# Patient Record
Sex: MALE | Race: WHITE | Employment: OTHER | URBAN - METROPOLITAN AREA
[De-identification: names, ages, dates, MRNs, and addresses within clinical notes are randomized per-mention and may not be internally consistent; named-entity substitution may affect disease eponyms.]

---

## 2017-06-07 ENCOUNTER — TELEPHONE (OUTPATIENT)
Dept: DERMATOLOGY | Facility: AMBULATORY SURGERY CENTER | Age: 67
End: 2017-06-07

## 2017-06-07 NOTE — TELEPHONE ENCOUNTER
Patient Appointment Date:   06/20/17   2:00pm      Khalida Lawrence, 75 y.o., male  Is calling for their Mohs Pre-Op Assessment    does not have Hepatitis C or HIV   does confirm site  does ID site. Allergies: Allergies not on file      does not have a Pacemaker  does not have a Defibrillator    does not need antibiotics      is not taking NSAIDs    is not taking aspirin      is not taking garlic  is not taking ginkgo  is not taking Ginseng  is not taking fish oils  is not taking vit E    does not take a blood thinner    is not taking Coumadin/Warfarin     Pre operative assessment questions asked to patient. Patient has a general understanding of the procedure, and has been versed that there will be local anesthesia used in the procedure and that He will be ok to drive themselves to and from the appointment.

## 2017-06-20 ENCOUNTER — OFFICE VISIT (OUTPATIENT)
Dept: DERMATOLOGY | Facility: AMBULATORY SURGERY CENTER | Age: 67
End: 2017-06-20

## 2017-06-20 VITALS
DIASTOLIC BLOOD PRESSURE: 78 MMHG | BODY MASS INDEX: 25.11 KG/M2 | WEIGHT: 160 LBS | SYSTOLIC BLOOD PRESSURE: 124 MMHG | HEIGHT: 67 IN | TEMPERATURE: 98.5 F | HEART RATE: 68 BPM | OXYGEN SATURATION: 98 %

## 2017-06-20 DIAGNOSIS — C44.319 BASAL CELL CARCINOMA OF RIGHT CHEEK: Primary | ICD-10-CM

## 2017-06-20 RX ORDER — HYDROCHLOROTHIAZIDE 25 MG/1
25 TABLET ORAL DAILY
Status: ON HOLD | COMMUNITY
End: 2022-08-31

## 2017-06-20 NOTE — PROGRESS NOTES
Pre-op: Patient present today for the evaluation of  to the Right cheek. Procedure explained with full understanding. Vitals:    06/20/17 1401   BP: 124/78   Pulse: 68   Temp: 98.5 °F (36.9 °C)   TempSrc: Oral   SpO2: 98%   Weight: 72.6 kg (160 lb)   Height: 5' 7\" (1.702 m)     preoperatively, will continue to monitor. Post-op: Written and verbal post-op wound care instructions given to patient with full understanding of care. Surgical wound bandaged with Vaseline, Telfa, 2x2 gauze, and coverall tape. All questions and concerns addressed. Vitals stable postoperatively.

## 2017-06-20 NOTE — MR AVS SNAPSHOT
Visit Information Date & Time Provider Department Dept. Phone Encounter #  
 6/20/2017  2:00 PM Chapito Velasquez MD [facility] [id number] Upcoming Health Maintenance Date Due Hepatitis C Screening 1950 DTaP/Tdap/Td series (1 - Tdap) 3/14/1970 FOBT Q 1 YEAR AGE 50-75 3/14/1999 ZOSTER VACCINE AGE 60> 3/14/2009 GLAUCOMA SCREENING Q2Y 3/14/2014 Pneumococcal 65+ Low/Medium Risk (1 of 2 - PCV13) 3/14/2014 MEDICARE YEARLY EXAM 3/14/2014 INFLUENZA AGE 9 TO ADULT 8/1/2017 Allergies as of 6/20/2017  Review Complete On: 6/20/2017 By: Chapito Velasquez MD  
 No Known Allergies Current Immunizations  Never Reviewed No immunizations on file. Not reviewed this visit You Were Diagnosed With   
  
 Codes Comments Basal cell carcinoma of right cheek    -  Primary ICD-10-CM: C44.319 ICD-9-CM: 173.31 Vitals BP Pulse Temp Height(growth percentile) Weight(growth percentile) SpO2  
 124/78 (BP 1 Location: Right arm, BP Patient Position: Sitting) 68 98.5 °F (36.9 °C) (Oral) 5' 7\" (1.702 m) 160 lb (72.6 kg) 98% BMI Smoking Status 25.06 kg/m2 Never Smoker Vitals History BMI and BSA Data Body Mass Index Body Surface Area 25.06 kg/m 2 1.85 m 2 Your Updated Medication List  
  
   
This list is accurate as of: 6/20/17  3:09 PM.  Always use your most recent med list.  
  
  
  
  
 hydroCHLOROthiazide 25 mg tablet Commonly known as:  HYDRODIURIL Take 25 mg by mouth daily. Patient Instructions WOUND CARE INSTRUCTIONS 1. Keep the dressing clean and dry and do not remove for 48 hours. 2. Then change the dressing once a day as follows: 
a. Wash hands before and after each dressing change.  
b. Remove dressing and wash site gently with mild soap and water, rinse, and pat dry. 
c. Apply an ointment (Bacitracin, Polysporin, Neosporin, Petroleum jelly or Aquaphor). d. Apply a non-stick (Telfa) dressing or Band-Aid to cover the wound. Remove pressure bandage Thursday, then wash gently and apply Vaseline and a band-aid to site daily for 1 week. 3. Watch for: BLEEDING: A small amount of drainage may occur. If bleeding occurs, elevate and rest the surgery site. Apply gauze and steady pressure for 15 minutes. If bleeding continues, call this office. INFECTION: Signs of infection include increased redness, pain, warmth, drainage of pus, and fever. If this occurs, call this office. 4. Special Instructions (follow any that are checked): ·  You have stitches that need to be removed in 0 days ·  Avoid bending at the waist and heavy lifting for two days. ·  Sleep with your head elevated for the next two nights. ·  Rest the surgery site and keep it elevated as much as possible for two days. ·  You may apply an ice-pack for 10-15 minutes every waking hour for the rest of the day. ·  Eat a soft diet and avoid hot food and hot drinks for the rest of the day. ·  Other instructions: Follow up as directed Take Tylenol for pain as needed. Once the site is healed with no remaining bandages or open areas, protect your surgical site and scar from the sun, as this area will be more sensitive. Use a broad spectrum sunscreen SPF 30 or higher daily, and a chemical free product (one containing zinc oxide or titanium dioxide) is a good choice if the area is sensitive. You may begin to gently massage the surgical site in 2-3 weeks, rubbing in a circular motion along the scar. This can help reduce swelling and thickness of a scar. A scar cream may be used beginnning 1 month after the surgery. If you have any questions or concerns, please call our office Monday through Friday at [phone]. Introducing [facility]!    
 [employer] introduces [employer] patient portal. Now you can access parts of your medical record, email your doctor's office, and request medication refills online. 1. In your internet browser, go to [email or web address] 2. Click on the First Time User? Click Here link in the Sign In box. You will see the New Member Sign Up page. 3. Enter your [employer] Access Code exactly as it appears below. You will not need to use this code after youve completed the sign-up process. If you do not sign up before the expiration date, you must request a new code. · [employer] Access Code: [email or web address] Expires: 9/18/2017  3:09 PM 
 
4. Enter the last four digits of your Social Security Number (xxxx) and Date of Birth (mm/dd/yyyy) as indicated and click Submit. You will be taken to the next sign-up page. 5. Create a [employer] ID. This will be your [employer] login ID and cannot be changed, so think of one that is secure and easy to remember. 6. Create a [employer] password. You can change your password at any time. 7. Enter your Password Reset Question and Answer. This can be used at a later time if you forget your password. 8. Enter your e-mail address. You will receive e-mail notification when new information is available in [address] 9. Click Sign Up. You can now view and download portions of your medical record. 10. Click the Download Summary menu link to download a portable copy of your medical information. If you have questions, please visit the Frequently Asked Questions section of the [employer] website. Remember, [employer] is NOT to be used for urgent needs. For medical emergencies, dial 911. Now available from your iPhone and Android! Please provide this summary of care documentation to your next provider. Your primary care clinician is listed as Oumou Lawrence. If you have any questions after today's visit, please call [phone].

## 2017-06-20 NOTE — PATIENT INSTRUCTIONS
WOUND CARE INSTRUCTIONS    1. Keep the dressing clean and dry and do not remove for 48 hours. 2. Then change the dressing once a day as follows:  a. Wash hands before and after each dressing change. b. Remove dressing and wash site gently with mild soap and water, rinse, and pat dry.  c. Apply an ointment (Bacitracin, Polysporin, Neosporin, Petroleum jelly or Aquaphor). d. Apply a non-stick (Telfa) dressing or Band-Aid to cover the wound. Remove pressure bandage Thursday, then wash gently and apply Vaseline and a band-aid to site daily for 1 week. 3. Watch for:  BLEEDING: A small amount of drainage may occur. If bleeding occurs, elevate and rest the surgery site. Apply gauze and steady pressure for 15 minutes. If bleeding continues, call this office. INFECTION: Signs of infection include increased redness, pain, warmth, drainage of pus, and fever. If this occurs, call this office. 4. Special Instructions (follow any that are checked):  · [] You have stitches that need to be removed in 0 days  · [x] Avoid bending at the waist and heavy lifting for two days. · [x] Sleep with your head elevated for the next two nights. · [x] Rest the surgery site and keep it elevated as much as possible for two days. · [x] You may apply an ice-pack for 10-15 minutes every waking hour for the rest of the day. · [] Eat a soft diet and avoid hot food and hot drinks for the rest of the day. · [] Other instructions: Follow up as directed  Take Tylenol for pain as needed. Once the site is healed with no remaining bandages or open areas, protect your surgical site and scar from the sun, as this area will be more sensitive. Use a broad spectrum sunscreen SPF 30 or higher daily, and a chemical free product (one containing zinc oxide or titanium dioxide) is a good choice if the area is sensitive. You may begin to gently massage the surgical site in 2-3 weeks, rubbing in a circular motion along the scar.  This can help reduce swelling and thickness of a scar. A scar cream may be used beginnning 1 month after the surgery. If you have any questions or concerns, please call our office Monday through Friday at [phone].

## 2017-06-20 NOTE — PROGRESS NOTES
This note is written by Gilberto Coreas, as dictated by Renetta Pastrana. Yara Patrick MD.    CC: Basal cell carcinoma on the right cheek    History of present illness:     Roque Macias is a 75 y.o. male referred by Dr. Jean Mane. 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 This is a basal cell carcinoma present for more than six months described as a lesion that persisted after cryotherapy with no prior treatment. Biopsy confirmed the diagnosis of basal cell carcinoma, and I reviewed the written pathology report. He is feeling well and in his usual state of health today. He has no pain, no current illnesses, no other skin concerns. His allergies, medications, medical, and social history are reviewed by me today. Exam:     He is an awake, alert, and oriented 75 y.o. male who appears well and in no distress. There is no preauricular, submandibular, or cervical lymphadenopathy. I examined his face. He has a 18 x 11 mm indistinct scar-like lesion on his right cheek. He confirms location. Assessment/plan:    1. Basal cell carcinoma, right cheek. I discussed the diagnosis of basal cell carcinoma and summarized the pathology report. Mohs surgery is indicated by site, size, poor definition, and histology. The procedure was discussed, verbal and written consent were obtained. I performed the procedure. Four stages were required to reach a tumor free plane. The surgical defect was managed with complex repair. There were no complications. He will follow up as needed as the site heals. Indications, risks, and options were discussed with Roque Macias preoperatively. Risks including, but not limited to: pain, bleeding, infection, tumor recurrence, scarring and damage to motor and/or sensory nerves, were discussed. Roque Macias chose Mohs surgery. Roque Macias was an acceptable surgery candidate.     Roque Macias was placed in the appropriate position on the operating table in the Mohs surgery procedure room. The area was prepped and draped in the standard manner. Gentian violet was used to outline the clinical margins of the tumor. Local anesthesia was then obtained. The grossly visible tumor was then removed, an underlying layer was excised and mapped according to the Mohs technique, and the individual specimens examined microscopically. The process was repeated until microscopic examination of the tissue specimens confirmed a tumor-free plane. Hemostasis was obtained with electrosurgery and pressure. The wound was covered between stages with moist saline gauze. Wound care instructions (written and verbal) and a follow up appointment were given to Glynn Kayeeverardo before discharge. Glynn Medina was discharged in good condition. The wound management options of second intent healing, layered closure, local flap, and/or full thickness skin graft were discussed. Glynn Medina understands the aims, risks, alternatives, and possible complications and elects to proceed with a complex layered closure. Wound margins were made vertical, edges undermined in the subcutaneous plane, standing cones corrected at both poles followed by layered closure. The wound was closed with buried 5-0 polysorb suture in the muscle and deep subcutis to reduce width of the wound, a second layer in the dermis to reduce tension on the skin edges, and skin edges were approximated with 6-0 fast gut suture. The final closure length was 45 mm. The wound was bandaged with Vaseline, Telfa, gauze and Coverroll. Wound care instructions (written and verbal) and a follow up appointment were given to Glynn Medina before discharge. Glynn Medina was discharged in good condition. 2. History of skin cancer. I discussed the diagnosis and recommend routine examinations with Dr. Lynn To for surveillance.     The documentation recorded by the scribe accurately reflects the service I personally performed and the decisions made by me. [facility]   OFFICE PROCEDURE PROGRESS NOTE     Chart reviewed for the following:     Nelida Homans Dana Qua, MD, have reviewed the History, Physical and updated the Allergic reactions for [address] performed immediately prior to start of procedure:     Nelida Homans Dana Qua, MD, have performed the following reviews on Alanson Cheadle prior to the start of the procedure:     * Patient was identified by name and date of birth   * Agreement on procedure being performed was verified   * Risks and Benefits explained to the patient   * Procedure site verified and marked as necessary   * Patient was positioned for comfort   * Consent was signed and verified     Time: 2:20PM  Date of procedure: 6/20/2017  Procedure performed by: iCndy Lara.  Nicole Tinoco MD   Provider assisted by: LPN   Patient assisted by: self   How tolerated by patient: tolerated the procedure well with no complications   Comments: none

## 2017-07-25 RX ORDER — FAMOTIDINE 10 MG/1
10 TABLET ORAL AS NEEDED
COMMUNITY

## 2017-07-26 ENCOUNTER — HOSPITAL ENCOUNTER (OUTPATIENT)
Age: 67
Setting detail: OUTPATIENT SURGERY
Discharge: HOME | End: 2017-07-26
Attending: SPECIALIST | Admitting: SPECIALIST
Payer: COMMERCIAL

## 2017-07-26 ENCOUNTER — ANESTHESIA (OUTPATIENT)
Dept: ENDOSCOPY | Age: 67
End: 2017-07-26
Payer: COMMERCIAL

## 2017-07-26 ENCOUNTER — ANESTHESIA EVENT (OUTPATIENT)
Dept: ENDOSCOPY | Age: 67
End: 2017-07-26
Payer: COMMERCIAL

## 2017-07-26 VITALS
HEIGHT: 67 IN | SYSTOLIC BLOOD PRESSURE: 124 MMHG | OXYGEN SATURATION: 96 % | TEMPERATURE: 97.7 F | BODY MASS INDEX: 25.11 KG/M2 | WEIGHT: 160 LBS | DIASTOLIC BLOOD PRESSURE: 80 MMHG | HEART RATE: 76 BPM | RESPIRATION RATE: 18 BRPM

## 2017-07-26 PROCEDURE — 77030027957 HC TBNG IRR ENDOGTR BUSS -B: Performed by: SPECIALIST

## 2017-07-26 PROCEDURE — 74011250636 HC RX REV CODE- 250/636: Performed by: SPECIALIST

## 2017-07-26 PROCEDURE — 76040000019: Performed by: SPECIALIST

## 2017-07-26 PROCEDURE — 76060000031 HC ANESTHESIA FIRST 0.5 HR: Performed by: SPECIALIST

## 2017-07-26 PROCEDURE — 74011250636 HC RX REV CODE- 250/636

## 2017-07-26 PROCEDURE — 74011000250 HC RX REV CODE- 250

## 2017-07-26 RX ORDER — MIDAZOLAM HYDROCHLORIDE 1 MG/ML
.25-1 INJECTION, SOLUTION INTRAMUSCULAR; INTRAVENOUS
Status: DISCONTINUED | OUTPATIENT
Start: 2017-07-26 | End: 2017-07-26 | Stop reason: HOSPADM

## 2017-07-26 RX ORDER — SODIUM CHLORIDE 9 MG/ML
INJECTION, SOLUTION INTRAVENOUS
Status: DISCONTINUED | OUTPATIENT
Start: 2017-07-26 | End: 2017-07-26 | Stop reason: HOSPADM

## 2017-07-26 RX ORDER — SODIUM CHLORIDE 9 MG/ML
50 INJECTION, SOLUTION INTRAVENOUS CONTINUOUS
Status: DISCONTINUED | OUTPATIENT
Start: 2017-07-26 | End: 2017-07-26 | Stop reason: HOSPADM

## 2017-07-26 RX ORDER — NALOXONE HYDROCHLORIDE 0.4 MG/ML
0.4 INJECTION, SOLUTION INTRAMUSCULAR; INTRAVENOUS; SUBCUTANEOUS
Status: DISCONTINUED | OUTPATIENT
Start: 2017-07-26 | End: 2017-07-26 | Stop reason: HOSPADM

## 2017-07-26 RX ORDER — ATROPINE SULFATE 0.1 MG/ML
0.5 INJECTION INTRAVENOUS
Status: DISCONTINUED | OUTPATIENT
Start: 2017-07-26 | End: 2017-07-26 | Stop reason: HOSPADM

## 2017-07-26 RX ORDER — SODIUM CHLORIDE 0.9 % (FLUSH) 0.9 %
5-10 SYRINGE (ML) INJECTION EVERY 8 HOURS
Status: DISCONTINUED | OUTPATIENT
Start: 2017-07-26 | End: 2017-07-26 | Stop reason: HOSPADM

## 2017-07-26 RX ORDER — FLUMAZENIL 0.1 MG/ML
0.2 INJECTION INTRAVENOUS
Status: DISCONTINUED | OUTPATIENT
Start: 2017-07-26 | End: 2017-07-26 | Stop reason: HOSPADM

## 2017-07-26 RX ORDER — PROPOFOL 10 MG/ML
INJECTION, EMULSION INTRAVENOUS AS NEEDED
Status: DISCONTINUED | OUTPATIENT
Start: 2017-07-26 | End: 2017-07-26 | Stop reason: HOSPADM

## 2017-07-26 RX ORDER — LIDOCAINE HYDROCHLORIDE 20 MG/ML
INJECTION, SOLUTION EPIDURAL; INFILTRATION; INTRACAUDAL; PERINEURAL AS NEEDED
Status: DISCONTINUED | OUTPATIENT
Start: 2017-07-26 | End: 2017-07-26 | Stop reason: HOSPADM

## 2017-07-26 RX ORDER — FENTANYL CITRATE 50 UG/ML
200 INJECTION, SOLUTION INTRAMUSCULAR; INTRAVENOUS
Status: DISCONTINUED | OUTPATIENT
Start: 2017-07-26 | End: 2017-07-26 | Stop reason: HOSPADM

## 2017-07-26 RX ORDER — LORAZEPAM 2 MG/ML
2 INJECTION INTRAMUSCULAR AS NEEDED
Status: DISCONTINUED | OUTPATIENT
Start: 2017-07-26 | End: 2017-07-26 | Stop reason: HOSPADM

## 2017-07-26 RX ORDER — DEXTROMETHORPHAN/PSEUDOEPHED 2.5-7.5/.8
1.2 DROPS ORAL
Status: DISCONTINUED | OUTPATIENT
Start: 2017-07-26 | End: 2017-07-26 | Stop reason: HOSPADM

## 2017-07-26 RX ORDER — SODIUM CHLORIDE 0.9 % (FLUSH) 0.9 %
5-10 SYRINGE (ML) INJECTION AS NEEDED
Status: DISCONTINUED | OUTPATIENT
Start: 2017-07-26 | End: 2017-07-26 | Stop reason: HOSPADM

## 2017-07-26 RX ORDER — EPINEPHRINE 0.1 MG/ML
1 INJECTION INTRACARDIAC; INTRAVENOUS
Status: DISCONTINUED | OUTPATIENT
Start: 2017-07-26 | End: 2017-07-26 | Stop reason: HOSPADM

## 2017-07-26 RX ADMIN — SODIUM CHLORIDE: 9 INJECTION, SOLUTION INTRAVENOUS at 07:32

## 2017-07-26 RX ADMIN — LIDOCAINE HYDROCHLORIDE 100 MG: 20 INJECTION, SOLUTION EPIDURAL; INFILTRATION; INTRACAUDAL; PERINEURAL at 07:32

## 2017-07-26 RX ADMIN — PROPOFOL 30 MG: 10 INJECTION, EMULSION INTRAVENOUS at 07:39

## 2017-07-26 RX ADMIN — PROPOFOL 80 MG: 10 INJECTION, EMULSION INTRAVENOUS at 07:32

## 2017-07-26 RX ADMIN — PROPOFOL 40 MG: 10 INJECTION, EMULSION INTRAVENOUS at 07:37

## 2017-07-26 NOTE — PROCEDURES
Colonoscopy Procedure Note    Indications:   Family history of coloretal adenoma  (screening only) polyps in father, Screening colonoscopy  Referring Physician: Valeria George MD   Anesthesia/Sedation:MAC  Endoscopist:  Dr. Carmen Mabry  Assistant:  Endoscopy Technician-1: Georgina Corey  Endoscopy RN-1: Nicholas Feng RN    Preoperative diagnosis: SCREENING    Postoperative diagnosis: Mild diverticulosis      Procedure in Detail:  Informed consent was obtained for the procedure, including sedation. Risks of perforation, hemorrhage, adverse drug reaction, and aspiration were discussed. The patient was placed in the left lateral decubitus position. Based on the pre-procedure assessment, including review of the patient's medical history, medications, allergies, and review of systems, he had been deemed to be an appropriate candidate for moderate sedation; he was therefore sedated with the medications listed above. The patient was monitored continuously with ECG tracing, pulse oximetry, blood pressure monitoring, and direct observations. A rectal examination was performed. The [id number] was inserted into the rectum and advanced under direct vision to the cecum, which was identified by the ileocecal valve and appendiceal orifice. The quality of the colonic preparation was excellent. A careful inspection was made as the colonoscope was withdrawn, including a retroflexed view of the rectum; findings and interventions are described below. Findings:   Rectum: normal  Sigmoid: mild diverticulosis; Descending Colon: normal  Transverse Colon: normal  Ascending Colon: normal  Cecum: normal  Terminal Ileum: not intubated    Specimens:     none    EBL: None    Complications: None; patient tolerated the procedure well. Recommendations:     - Repeat colonoscopy in 5 years.      - If < 10 years, reason: above average risk patient     - FHx of colon polyps in father    Signed By: Kirstin Vigil Virgie Guajardo MD                        July 26, 2017

## 2017-07-26 NOTE — DISCHARGE INSTRUCTIONS
Karolyn Choi  [id number]  1950    COLON DISCHARGE INSTRUCTIONS  Discomfort:  Redness at IV site- apply warm compress to area; if redness or soreness persist- contact your physician  There may be a slight amount of blood passed from the rectum  Gaseous discomfort- walking, belching will help relieve any discomfort  You may not operate a vehicle for 12 hours  You may not engage in an occupation involving machinery or appliances for rest of today  You may not drink alcoholic beverages for at least 12 hours  Avoid making any critical decisions for at least 24 hour  DIET:   Regular diet. - however -  remember your colon is empty and a heavy meal will produce gas. Avoid these foods:  vegetables, fried / greasy foods, carbonated drinks for today. ACTIVITY:  You may resume your normal daily activities it is recommended that you spend the remainder of the day resting -  avoid any strenuous activity. CALL M.D. ANY SIGN OF:  Increasing pain, nausea, vomiting  Abdominal distension (swelling)  New increased bleeding (oral or rectal)  Fever (chills)  Pain in chest area  Bloody discharge from nose or mouth  Shortness of breath    You may  take any Advil, Aspirin, Ibuprofen, Motrin, Aleve, Goodys, or any similar pain or arthritis products or Tylenol as needed for pain. Follow-up Instructions:   Call Dr. Coral Mata telephone for problems or questions [phone]    - Repeat colonoscopy in 5 years.      - If < 10 years, reason: above average risk patient     - FHx of colon polyps in father

## 2017-07-26 NOTE — ANESTHESIA PREPROCEDURE EVALUATION
Anesthetic History   No history of anesthetic complications            Review of Systems / Medical History  Patient summary reviewed, nursing notes reviewed and pertinent labs reviewed    Pulmonary  Within defined limits                 Neuro/Psych   Within defined limits           Cardiovascular  Within defined limits  Hypertension                   GI/Hepatic/Renal  Within defined limits              Endo/Other  Within defined limits           Other Findings              Physical Exam    Airway  Mallampati: II  TM Distance: > 6 cm  Neck ROM: normal range of motion   Mouth opening: Normal     Cardiovascular  Regular rate and rhythm,  S1 and S2 normal,  no murmur, click, rub, or gallop             Dental  No notable dental hx       Pulmonary  Breath sounds clear to auscultation               Abdominal  GI exam deferred       Other Findings            Anesthetic Plan    ASA: 2  Anesthesia type: MAC          Induction: Intravenous  Anesthetic plan and risks discussed with: Patient

## 2017-07-26 NOTE — H&P
Pre-endoscopy H and P for Colonoscopy    The patient was seen and examined. Date of last colonoscopy: 2006, Polyps  No      The airway was assessed and documented. The problem list, past medical history, and medications were reviewed. There is no problem list on file for this patient. Social History     Social History    Marital status:      Spouse name: N/A    Number of children: N/A    Years of education: N/A     Occupational History    Not on file. Social History Main Topics    Smoking status: Never Smoker    Smokeless tobacco: Never Used    Alcohol use Yes      Comment: twice a week    Drug use: Not on file    Sexual activity: Not on file     Other Topics Concern    Not on file     Social History Narrative     Past Medical History:   Diagnosis Date    Cancer ([facility] [address]     BCCA skin    Family history of skin cancer     Hypertension     Ill-defined condition     meineer's disease     The patient has a family history of na    Prior to Admission Medications   Prescriptions Last Dose Informant Patient Reported? Taking?   famotidine (PEPCID) 10 mg tablet 7/25/2017 at Unknown time  Yes Yes   Sig: Take 10 mg by mouth as needed. hydroCHLOROthiazide (HYDRODIURIL) 25 mg tablet 7/25/2017 at Unknown time  Yes Yes   Sig: Take 25 mg by mouth daily. Facility-Administered Medications: None         The review of systems is:  negative for shortness of breath or chest pain      The heart, lungs and mental status were satisfactory for the administration of MAC sedation and for the procedure. Mallampati score: See Anesthesia. I discussed with the patient the objectives, risks, consequences and alternatives to the procedure. Plan: Endoscopic procedure with MAC sedation.     Adonis Bird MD  7/26/2017  7:27 AM

## 2017-07-26 NOTE — ANESTHESIA POSTPROCEDURE EVALUATION
Post-Anesthesia Evaluation and Assessment    Patient: Jerry Carmona MRN: [id number]  SSN: [id number]    YOB: 1950  Age: 75 y.o. Sex: male       Cardiovascular Function/Vital Signs  Visit Vitals    /70    Pulse 73    Resp 12    Ht 5' 7\" (1.702 m)    Wt 72.6 kg (160 lb)    SpO2 98%    BMI 25.06 kg/m2       Patient is status post MAC anesthesia for Procedure(s):  COLONOSCOPY. Nausea/Vomiting: None    Postoperative hydration reviewed and adequate. Pain:  Pain Scale 1: Numeric (0 - 10) (07/26/17 0752)  Pain Intensity 1: 0 (07/26/17 0752)   Managed    Neurological Status: At baseline    Mental Status and Level of Consciousness: Arousable    Pulmonary Status:   O2 Device: Room air (07/26/17 0752)   Adequate oxygenation and airway patent    Complications related to anesthesia: None    Post-anesthesia assessment completed.  No concerns    Signed By: Satish Ortiz MD     July 26, 2017

## 2017-07-26 NOTE — PERIOP NOTES
Patient has been evaluated by anesthesia pre-procedure. Patient alert and oriented. Vital signs will not be charted by the Endoscopy nurse. All vitals, airway, and loc are monitored by anesthesia staff throughout procedure. .Endoscope was pre-cleaned at bedside immediately following procedure by KORINA Dill.

## 2017-07-26 NOTE — IP AVS SNAPSHOT
[address] 
[phone] Patient: Ernst Blair MRN: [id number] AIO:[id number]/3371 You are allergic to the following No active allergies Recent Documentation Height Weight BMI Smoking Status 1.702 m 72.6 kg 25.06 kg/m2 Never Smoker Emergency Contacts Name Discharge Info Relation Home Work Mobile Darlene Pollen  Spouse [3] [phone] About your hospitalization You were admitted on:  July 26, 2017 You last received care in the:  [facility] ENDOSCOPY You were discharged on:  July 26, 2017 Unit phone number:  [phone] Why you were hospitalized Your primary diagnosis was:  Not on File Providers Seen During Your Hospitalizations Provider Role Specialty Primary office phone Marie Moore MD Attending Provider Gastroenterology [phone] Your Primary Care Physician (PCP) Primary Care Physician Office Phone Office Fax Kashif Diaz [phone] Follow-up Information None Current Discharge Medication List  
  
CONTINUE these medications which have NOT CHANGED Dose & Instructions Dispensing Information Comments Morning Noon Evening Bedtime  
 famotidine 10 mg tablet Commonly known as:  PEPCID Your last dose was: Your next dose is:    
   
   
 Dose:  10 mg Take 10 mg by mouth as needed. Refills:  0  
     
   
   
   
  
 hydroCHLOROthiazide 25 mg tablet Commonly known as:  HYDRODIURIL Your last dose was: Your next dose is:    
   
   
 Dose:  25 mg Take 25 mg by mouth daily. Refills:  0 Discharge Instructions Ernst Blair [id number] COLON DISCHARGE INSTRUCTIONS Discomfort: 
Redness at IV site- apply warm compress to area; if redness or soreness persist- contact your physician There may be a slight amount of blood passed from the rectum Gaseous discomfort- walking, belching will help relieve any discomfort You may not operate a vehicle for 12 hours You may not engage in an occupation involving machinery or appliances for rest of today You may not drink alcoholic beverages for at least 12 hours Avoid making any critical decisions for at least 24 hour DIET: 
 Regular diet.  however -  remember your colon is empty and a heavy meal will produce gas. Avoid these foods:  vegetables, fried / greasy foods, carbonated drinks for today. ACTIVITY: 
You may resume your normal daily activities it is recommended that you spend the remainder of the day resting -  avoid any strenuous activity. CALL M.D. ANY SIGN OF: Increasing pain, nausea, vomiting Abdominal distension (swelling) New increased bleeding (oral or rectal) Fever (chills) Pain in chest area Bloody discharge from nose or mouth Shortness of breath You may  take any Advil, Aspirin, Ibuprofen, Motrin, Aleve, Goodys, or any similar pain or arthritis products or Tylenol as needed for pain. Follow-up Instructions: 
 Call Dr. Camp Sensing Office telephone for problems or questions [phone] 
  - Repeat colonoscopy in 5 years. - If < 10 years, reason: above average risk patient 
   - FHx of colon polyps in father Discharge Orders None Introducing \[facility]\"" & HEALTH SERVICES! Walter Davenport introduces [facility] patient portal. Now you can access parts of your medical record, email your doctor's office, and request medication refills online. 1. In your internet browser, go to [email or web address] 2. Click on the First Time User? Click Here link in the Sign In box. You will see the New Member Sign Up page. 3. Enter your [facility] Access Code exactly as it appears below. You will not need to use this code after youve completed the sign-up process.  If you do not sign up before the expiration date, you must request a new code. · [employer] Access Code: [email or web address] Expires: 9/18/2017  3:09 PM 
 
4. Enter the last four digits of your Social Security Number (xxxx) and Date of Birth (mm/dd/yyyy) as indicated and click Submit. You will be taken to the next sign-up page. 5. Create a [employer] ID. This will be your [employer] login ID and cannot be changed, so think of one that is secure and easy to remember. 6. Create a [employer] password. You can change your password at any time. 7. Enter your Password Reset Question and Answer. This can be used at a later time if you forget your password. 8. Enter your e-mail address. You will receive e-mail notification when new information is available in [address] 9. Click Sign Up. You can now view and download portions of your medical record. 10. Click the Download Summary menu link to download a portable copy of your medical information. If you have questions, please visit the Frequently Asked Questions section of the [employer] website. Remember, [employer] is NOT to be used for urgent needs. For medical emergencies, dial 911. Now available from your iPhone and Android! General Information Please provide this summary of care documentation to your next provider. Patient Signature:  ____________________________________________________________ Date:  ____________________________________________________________  
  
Jose David [facility] Provider Signature:  ____________________________________________________________ Date:  ____________________________________________________________

## 2018-07-12 PROCEDURE — 88342 IMHCHEM/IMCYTCHM 1ST ANTB: CPT | Performed by: PLASTIC SURGERY

## 2018-07-12 PROCEDURE — 88305 TISSUE EXAM BY PATHOLOGIST: CPT | Performed by: PLASTIC SURGERY

## 2018-07-13 ENCOUNTER — HOSPITAL ENCOUNTER (OUTPATIENT)
Dept: LAB | Age: 68
Discharge: HOME | End: 2018-07-13

## 2019-05-14 ENCOUNTER — HOSPITAL ENCOUNTER (INPATIENT)
Age: 69
LOS: 17 days | Discharge: NURSING, REHAB OR LONG-TERM CARE | DRG: 028 | End: 2019-06-01
Attending: STUDENT IN AN ORGANIZED HEALTH CARE EDUCATION/TRAINING PROGRAM | Admitting: INTERNAL MEDICINE
Payer: MEDICARE

## 2019-05-14 ENCOUNTER — APPOINTMENT (OUTPATIENT)
Dept: GENERAL RADIOLOGY | Age: 69
DRG: 028 | End: 2019-05-14
Attending: STUDENT IN AN ORGANIZED HEALTH CARE EDUCATION/TRAINING PROGRAM
Payer: MEDICARE

## 2019-05-14 ENCOUNTER — APPOINTMENT (OUTPATIENT)
Dept: CT IMAGING | Age: 69
DRG: 028 | End: 2019-05-14
Attending: STUDENT IN AN ORGANIZED HEALTH CARE EDUCATION/TRAINING PROGRAM
Payer: MEDICARE

## 2019-05-14 DIAGNOSIS — F10.929 ALCOHOLIC INTOXICATION WITH COMPLICATION (HCC): Primary | ICD-10-CM

## 2019-05-14 DIAGNOSIS — F10.121 ALCOHOL INTOXICATION DELIRIUM (HCC): ICD-10-CM

## 2019-05-14 DIAGNOSIS — R56.9 SEIZURE (HCC): ICD-10-CM

## 2019-05-14 DIAGNOSIS — R90.89 ABNORMAL BRAIN MRI: ICD-10-CM

## 2019-05-14 DIAGNOSIS — S14.101A SPINAL CORD INJURY AT C1-C4 LEVEL, INITIAL ENCOUNTER (HCC): ICD-10-CM

## 2019-05-14 DIAGNOSIS — S14.129A CENTRAL CORD SYNDROME, INITIAL ENCOUNTER (HCC): ICD-10-CM

## 2019-05-14 LAB
ALBUMIN SERPL-MCNC: 3.8 G/DL (ref 3.5–5)
ALBUMIN/GLOB SERPL: 1.2 {RATIO} (ref 1.1–2.2)
ALP SERPL-CCNC: 82 U/L (ref 45–117)
ALT SERPL-CCNC: 24 U/L (ref 12–78)
AMPHET UR QL SCN: NEGATIVE
ANION GAP SERPL CALC-SCNC: 8 MMOL/L (ref 5–15)
APAP SERPL-MCNC: <2 UG/ML (ref 10–30)
APPEARANCE UR: CLEAR
AST SERPL-CCNC: 20 U/L (ref 15–37)
BARBITURATES UR QL SCN: NEGATIVE
BASOPHILS # BLD: 0 K/UL (ref 0–0.1)
BASOPHILS NFR BLD: 0 % (ref 0–1)
BENZODIAZ UR QL: NEGATIVE
BILIRUB SERPL-MCNC: 0.5 MG/DL (ref 0.2–1)
BILIRUB UR QL: NEGATIVE
BUN SERPL-MCNC: 13 MG/DL (ref 6–20)
BUN/CREAT SERPL: 15 (ref 12–20)
CALCIUM SERPL-MCNC: 8.6 MG/DL (ref 8.5–10.1)
CANNABINOIDS UR QL SCN: NEGATIVE
CHLORIDE SERPL-SCNC: 108 MMOL/L (ref 97–108)
CK SERPL-CCNC: 111 U/L (ref 39–308)
CO2 SERPL-SCNC: 26 MMOL/L (ref 21–32)
COCAINE UR QL SCN: NEGATIVE
COLOR UR: ABNORMAL
CREAT SERPL-MCNC: 0.89 MG/DL (ref 0.7–1.3)
DIFFERENTIAL METHOD BLD: ABNORMAL
DRUG SCRN COMMENT,DRGCM: NORMAL
EOSINOPHIL # BLD: 0 K/UL (ref 0–0.4)
EOSINOPHIL NFR BLD: 0 % (ref 0–7)
ERYTHROCYTE [DISTWIDTH] IN BLOOD BY AUTOMATED COUNT: 13.8 % (ref 11.5–14.5)
ETHANOL SERPL-MCNC: 162 MG/DL
GLOBULIN SER CALC-MCNC: 3.3 G/DL (ref 2–4)
GLUCOSE SERPL-MCNC: 96 MG/DL (ref 65–100)
GLUCOSE UR STRIP.AUTO-MCNC: NEGATIVE MG/DL
HCT VFR BLD AUTO: 47.3 % (ref 36.6–50.3)
HGB BLD-MCNC: 15.1 G/DL (ref 12.1–17)
HGB UR QL STRIP: NEGATIVE
IMM GRANULOCYTES # BLD AUTO: 0.1 K/UL (ref 0–0.04)
IMM GRANULOCYTES NFR BLD AUTO: 1 % (ref 0–0.5)
KETONES UR QL STRIP.AUTO: 15 MG/DL
LACTATE SERPL-SCNC: 3 MMOL/L (ref 0.4–2)
LACTATE SERPL-SCNC: 3.5 MMOL/L (ref 0.4–2)
LEUKOCYTE ESTERASE UR QL STRIP.AUTO: NEGATIVE
LIPASE SERPL-CCNC: 88 U/L (ref 73–393)
LYMPHOCYTES # BLD: 1 K/UL (ref 0.8–3.5)
LYMPHOCYTES NFR BLD: 7 % (ref 12–49)
MAGNESIUM SERPL-MCNC: 2.3 MG/DL (ref 1.6–2.4)
MCH RBC QN AUTO: 29.1 PG (ref 26–34)
MCHC RBC AUTO-ENTMCNC: 31.9 G/DL (ref 30–36.5)
MCV RBC AUTO: 91.1 FL (ref 80–99)
METHADONE UR QL: NEGATIVE
MONOCYTES # BLD: 0.4 K/UL (ref 0–1)
MONOCYTES NFR BLD: 3 % (ref 5–13)
NEUTS SEG # BLD: 12.9 K/UL (ref 1.8–8)
NEUTS SEG NFR BLD: 89 % (ref 32–75)
NITRITE UR QL STRIP.AUTO: NEGATIVE
NRBC # BLD: 0 K/UL (ref 0–0.01)
NRBC BLD-RTO: 0 PER 100 WBC
OPIATES UR QL: NEGATIVE
PCP UR QL: NEGATIVE
PH UR STRIP: 5.5 [PH] (ref 5–8)
PLATELET # BLD AUTO: 216 K/UL (ref 150–400)
PMV BLD AUTO: 9.7 FL (ref 8.9–12.9)
POTASSIUM SERPL-SCNC: 3.9 MMOL/L (ref 3.5–5.1)
PROT SERPL-MCNC: 7.1 G/DL (ref 6.4–8.2)
PROT UR STRIP-MCNC: NEGATIVE MG/DL
RBC # BLD AUTO: 5.19 M/UL (ref 4.1–5.7)
SALICYLATES SERPL-MCNC: <1.7 MG/DL (ref 2.8–20)
SODIUM SERPL-SCNC: 142 MMOL/L (ref 136–145)
SP GR UR REFRACTOMETRY: 1.02 (ref 1–1.03)
TROPONIN I SERPL-MCNC: <0.05 NG/ML
UR CULT HOLD, URHOLD: NORMAL
UROBILINOGEN UR QL STRIP.AUTO: 0.2 EU/DL (ref 0.2–1)
WBC # BLD AUTO: 14.4 K/UL (ref 4.1–11.1)

## 2019-05-14 PROCEDURE — 81003 URINALYSIS AUTO W/O SCOPE: CPT

## 2019-05-14 PROCEDURE — 84484 ASSAY OF TROPONIN QUANT: CPT

## 2019-05-14 PROCEDURE — 74011000250 HC RX REV CODE- 250: Performed by: STUDENT IN AN ORGANIZED HEALTH CARE EDUCATION/TRAINING PROGRAM

## 2019-05-14 PROCEDURE — 83735 ASSAY OF MAGNESIUM: CPT

## 2019-05-14 PROCEDURE — 83605 ASSAY OF LACTIC ACID: CPT

## 2019-05-14 PROCEDURE — 96365 THER/PROPH/DIAG IV INF INIT: CPT

## 2019-05-14 PROCEDURE — 82550 ASSAY OF CK (CPK): CPT

## 2019-05-14 PROCEDURE — 96361 HYDRATE IV INFUSION ADD-ON: CPT

## 2019-05-14 PROCEDURE — 74011250636 HC RX REV CODE- 250/636: Performed by: STUDENT IN AN ORGANIZED HEALTH CARE EDUCATION/TRAINING PROGRAM

## 2019-05-14 PROCEDURE — 80053 COMPREHEN METABOLIC PANEL: CPT

## 2019-05-14 PROCEDURE — 80307 DRUG TEST PRSMV CHEM ANLYZR: CPT

## 2019-05-14 PROCEDURE — 36415 COLL VENOUS BLD VENIPUNCTURE: CPT

## 2019-05-14 PROCEDURE — 71045 X-RAY EXAM CHEST 1 VIEW: CPT

## 2019-05-14 PROCEDURE — 72125 CT NECK SPINE W/O DYE: CPT

## 2019-05-14 PROCEDURE — 93005 ELECTROCARDIOGRAM TRACING: CPT

## 2019-05-14 PROCEDURE — 85025 COMPLETE CBC W/AUTO DIFF WBC: CPT

## 2019-05-14 PROCEDURE — 83690 ASSAY OF LIPASE: CPT

## 2019-05-14 PROCEDURE — 96375 TX/PRO/DX INJ NEW DRUG ADDON: CPT

## 2019-05-14 PROCEDURE — 99285 EMERGENCY DEPT VISIT HI MDM: CPT

## 2019-05-14 PROCEDURE — 70450 CT HEAD/BRAIN W/O DYE: CPT

## 2019-05-14 RX ORDER — ONDANSETRON 2 MG/ML
4 INJECTION INTRAMUSCULAR; INTRAVENOUS
Status: COMPLETED | OUTPATIENT
Start: 2019-05-14 | End: 2019-05-14

## 2019-05-14 RX ADMIN — ONDANSETRON 4 MG: 2 INJECTION INTRAMUSCULAR; INTRAVENOUS at 21:21

## 2019-05-14 RX ADMIN — SODIUM CHLORIDE 1000 ML: 900 INJECTION, SOLUTION INTRAVENOUS at 20:11

## 2019-05-14 RX ADMIN — FOLIC ACID: 5 INJECTION, SOLUTION INTRAMUSCULAR; INTRAVENOUS; SUBCUTANEOUS at 21:12

## 2019-05-14 RX ADMIN — SODIUM CHLORIDE 1000 ML: 900 INJECTION, SOLUTION INTRAVENOUS at 18:54

## 2019-05-14 NOTE — ED TRIAGE NOTES
Pt presents from EMS after \"placing an enema in my rectum and putting a third of a bottle of wine up it\" Pt states he wanted to get drunk.  EMS reports pt wife found him face down in the garden

## 2019-05-15 ENCOUNTER — APPOINTMENT (OUTPATIENT)
Dept: CT IMAGING | Age: 69
DRG: 028 | End: 2019-05-15
Attending: INTERNAL MEDICINE
Payer: MEDICARE

## 2019-05-15 ENCOUNTER — APPOINTMENT (OUTPATIENT)
Dept: GENERAL RADIOLOGY | Age: 69
DRG: 028 | End: 2019-05-15
Attending: INTERNAL MEDICINE
Payer: MEDICARE

## 2019-05-15 PROBLEM — F10.929 ALCOHOL INTOXICATION (HCC): Status: ACTIVE | Noted: 2019-05-15

## 2019-05-15 PROBLEM — F10.121 ALCOHOL INTOXICATION DELIRIUM (HCC): Status: ACTIVE | Noted: 2019-05-15

## 2019-05-15 LAB
ALBUMIN SERPL-MCNC: 3.1 G/DL (ref 3.5–5)
ALBUMIN/GLOB SERPL: 1.2 {RATIO} (ref 1.1–2.2)
ALP SERPL-CCNC: 73 U/L (ref 45–117)
ALT SERPL-CCNC: 39 U/L (ref 12–78)
AMYLASE SERPL-CCNC: 58 U/L (ref 25–115)
ANION GAP SERPL CALC-SCNC: 15 MMOL/L (ref 5–15)
AST SERPL-CCNC: 53 U/L (ref 15–37)
ATRIAL RATE: 89 BPM
BASOPHILS # BLD: 0 K/UL (ref 0–0.1)
BASOPHILS NFR BLD: 0 % (ref 0–1)
BILIRUB SERPL-MCNC: 0.8 MG/DL (ref 0.2–1)
BUN SERPL-MCNC: 15 MG/DL (ref 6–20)
BUN/CREAT SERPL: 23 (ref 12–20)
CALCIUM SERPL-MCNC: 7.5 MG/DL (ref 8.5–10.1)
CALCULATED P AXIS, ECG09: 43 DEGREES
CALCULATED R AXIS, ECG10: 40 DEGREES
CALCULATED T AXIS, ECG11: 50 DEGREES
CHLORIDE SERPL-SCNC: 108 MMOL/L (ref 97–108)
CHOLEST SERPL-MCNC: 129 MG/DL
CK MB CFR SERPL CALC: 2.3 % (ref 0–2.5)
CK MB SERPL-MCNC: 12.8 NG/ML (ref 5–25)
CK SERPL-CCNC: 564 U/L (ref 39–308)
CO2 SERPL-SCNC: 17 MMOL/L (ref 21–32)
CREAT SERPL-MCNC: 0.66 MG/DL (ref 0.7–1.3)
DIAGNOSIS, 93000: NORMAL
DIFFERENTIAL METHOD BLD: ABNORMAL
EOSINOPHIL # BLD: 0 K/UL (ref 0–0.4)
EOSINOPHIL NFR BLD: 0 % (ref 0–7)
ERYTHROCYTE [DISTWIDTH] IN BLOOD BY AUTOMATED COUNT: 14.2 % (ref 11.5–14.5)
GLOBULIN SER CALC-MCNC: 2.6 G/DL (ref 2–4)
GLUCOSE BLD STRIP.AUTO-MCNC: 81 MG/DL (ref 65–100)
GLUCOSE SERPL-MCNC: 58 MG/DL (ref 65–100)
HCT VFR BLD AUTO: 41.7 % (ref 36.6–50.3)
HDLC SERPL-MCNC: 54 MG/DL
HDLC SERPL: 2.4 {RATIO} (ref 0–5)
HGB BLD-MCNC: 13.4 G/DL (ref 12.1–17)
IMM GRANULOCYTES # BLD AUTO: 0.2 K/UL (ref 0–0.04)
IMM GRANULOCYTES NFR BLD AUTO: 1 % (ref 0–0.5)
LACTATE SERPL-SCNC: 1.3 MMOL/L (ref 0.4–2)
LACTATE SERPL-SCNC: 3 MMOL/L (ref 0.4–2)
LDLC SERPL CALC-MCNC: 65 MG/DL (ref 0–100)
LIPASE SERPL-CCNC: 56 U/L (ref 73–393)
LIPID PROFILE,FLP: NORMAL
LYMPHOCYTES # BLD: 0.8 K/UL (ref 0.8–3.5)
LYMPHOCYTES NFR BLD: 5 % (ref 12–49)
MAGNESIUM SERPL-MCNC: 1.6 MG/DL (ref 1.6–2.4)
MCH RBC QN AUTO: 29.3 PG (ref 26–34)
MCHC RBC AUTO-ENTMCNC: 32.1 G/DL (ref 30–36.5)
MCV RBC AUTO: 91.2 FL (ref 80–99)
MONOCYTES # BLD: 1 K/UL (ref 0–1)
MONOCYTES NFR BLD: 6 % (ref 5–13)
NEUTS SEG # BLD: 14.7 K/UL (ref 1.8–8)
NEUTS SEG NFR BLD: 88 % (ref 32–75)
NRBC # BLD: 0 K/UL (ref 0–0.01)
NRBC BLD-RTO: 0 PER 100 WBC
P-R INTERVAL, ECG05: 128 MS
PHOSPHATE SERPL-MCNC: 3.8 MG/DL (ref 2.6–4.7)
PLATELET # BLD AUTO: 192 K/UL (ref 150–400)
PLATELET COMMENTS,PCOM: ABNORMAL
PMV BLD AUTO: 9.4 FL (ref 8.9–12.9)
POTASSIUM SERPL-SCNC: 3 MMOL/L (ref 3.5–5.1)
PROT SERPL-MCNC: 5.7 G/DL (ref 6.4–8.2)
Q-T INTERVAL, ECG07: 372 MS
QRS DURATION, ECG06: 80 MS
QTC CALCULATION (BEZET), ECG08: 452 MS
RBC # BLD AUTO: 4.57 M/UL (ref 4.1–5.7)
RBC MORPH BLD: ABNORMAL
SERVICE CMNT-IMP: NORMAL
SODIUM SERPL-SCNC: 140 MMOL/L (ref 136–145)
TRIGL SERPL-MCNC: 50 MG/DL (ref ?–150)
TROPONIN I SERPL-MCNC: <0.05 NG/ML
TSH SERPL DL<=0.05 MIU/L-ACNC: 0.43 UIU/ML (ref 0.36–3.74)
VENTRICULAR RATE, ECG03: 89 BPM
VLDLC SERPL CALC-MCNC: 10 MG/DL
WBC # BLD AUTO: 16.7 K/UL (ref 4.1–11.1)

## 2019-05-15 PROCEDURE — 73030 X-RAY EXAM OF SHOULDER: CPT

## 2019-05-15 PROCEDURE — 51798 US URINE CAPACITY MEASURE: CPT

## 2019-05-15 PROCEDURE — 74011250636 HC RX REV CODE- 250/636: Performed by: INTERNAL MEDICINE

## 2019-05-15 PROCEDURE — 80061 LIPID PANEL: CPT

## 2019-05-15 PROCEDURE — 74011000258 HC RX REV CODE- 258: Performed by: RADIOLOGY

## 2019-05-15 PROCEDURE — 65660000000 HC RM CCU STEPDOWN

## 2019-05-15 PROCEDURE — 74011000250 HC RX REV CODE- 250: Performed by: INTERNAL MEDICINE

## 2019-05-15 PROCEDURE — 80053 COMPREHEN METABOLIC PANEL: CPT

## 2019-05-15 PROCEDURE — 84100 ASSAY OF PHOSPHORUS: CPT

## 2019-05-15 PROCEDURE — 82962 GLUCOSE BLOOD TEST: CPT

## 2019-05-15 PROCEDURE — 94761 N-INVAS EAR/PLS OXIMETRY MLT: CPT

## 2019-05-15 PROCEDURE — 85025 COMPLETE CBC W/AUTO DIFF WBC: CPT

## 2019-05-15 PROCEDURE — 82550 ASSAY OF CK (CPK): CPT

## 2019-05-15 PROCEDURE — 74011636320 HC RX REV CODE- 636/320: Performed by: RADIOLOGY

## 2019-05-15 PROCEDURE — 84443 ASSAY THYROID STIM HORMONE: CPT

## 2019-05-15 PROCEDURE — 71275 CT ANGIOGRAPHY CHEST: CPT

## 2019-05-15 PROCEDURE — 74011250637 HC RX REV CODE- 250/637: Performed by: INTERNAL MEDICINE

## 2019-05-15 PROCEDURE — 83605 ASSAY OF LACTIC ACID: CPT

## 2019-05-15 PROCEDURE — 83690 ASSAY OF LIPASE: CPT

## 2019-05-15 PROCEDURE — 74177 CT ABD & PELVIS W/CONTRAST: CPT

## 2019-05-15 PROCEDURE — 83735 ASSAY OF MAGNESIUM: CPT

## 2019-05-15 PROCEDURE — 84484 ASSAY OF TROPONIN QUANT: CPT

## 2019-05-15 PROCEDURE — C9113 INJ PANTOPRAZOLE SODIUM, VIA: HCPCS | Performed by: INTERNAL MEDICINE

## 2019-05-15 PROCEDURE — 36415 COLL VENOUS BLD VENIPUNCTURE: CPT

## 2019-05-15 PROCEDURE — 82150 ASSAY OF AMYLASE: CPT

## 2019-05-15 RX ORDER — HYDROCHLOROTHIAZIDE 25 MG/1
25 TABLET ORAL DAILY
Status: DISCONTINUED | OUTPATIENT
Start: 2019-05-16 | End: 2019-05-15

## 2019-05-15 RX ORDER — SODIUM CHLORIDE 0.9 % (FLUSH) 0.9 %
10 SYRINGE (ML) INJECTION
Status: COMPLETED | OUTPATIENT
Start: 2019-05-15 | End: 2019-05-15

## 2019-05-15 RX ORDER — MAGNESIUM SULFATE HEPTAHYDRATE 40 MG/ML
2 INJECTION, SOLUTION INTRAVENOUS ONCE
Status: COMPLETED | OUTPATIENT
Start: 2019-05-15 | End: 2019-05-15

## 2019-05-15 RX ORDER — ACETAMINOPHEN 500 MG
TABLET ORAL
Status: DISPENSED
Start: 2019-05-15 | End: 2019-05-15

## 2019-05-15 RX ORDER — SODIUM CHLORIDE, SODIUM LACTATE, POTASSIUM CHLORIDE, CALCIUM CHLORIDE 600; 310; 30; 20 MG/100ML; MG/100ML; MG/100ML; MG/100ML
150 INJECTION, SOLUTION INTRAVENOUS CONTINUOUS
Status: DISCONTINUED | OUTPATIENT
Start: 2019-05-15 | End: 2019-05-15

## 2019-05-15 RX ORDER — NALOXONE HYDROCHLORIDE 0.4 MG/ML
0.4 INJECTION, SOLUTION INTRAMUSCULAR; INTRAVENOUS; SUBCUTANEOUS AS NEEDED
Status: DISCONTINUED | OUTPATIENT
Start: 2019-05-15 | End: 2019-06-01 | Stop reason: HOSPADM

## 2019-05-15 RX ORDER — ACETAMINOPHEN 325 MG/1
650 TABLET ORAL
Status: DISCONTINUED | OUTPATIENT
Start: 2019-05-15 | End: 2019-05-15 | Stop reason: SDUPTHER

## 2019-05-15 RX ORDER — ACETAMINOPHEN 325 MG/1
650 TABLET ORAL
Status: DISCONTINUED | OUTPATIENT
Start: 2019-05-15 | End: 2019-06-01 | Stop reason: HOSPADM

## 2019-05-15 RX ORDER — LIDOCAINE 50 MG/G
1 PATCH TOPICAL EVERY 24 HOURS
Status: DISCONTINUED | OUTPATIENT
Start: 2019-05-15 | End: 2019-05-23

## 2019-05-15 RX ORDER — LORAZEPAM 2 MG/ML
4 INJECTION INTRAMUSCULAR
Status: DISCONTINUED | OUTPATIENT
Start: 2019-05-15 | End: 2019-05-18

## 2019-05-15 RX ORDER — HYDROCHLOROTHIAZIDE 25 MG/1
25 TABLET ORAL DAILY
Status: DISCONTINUED | OUTPATIENT
Start: 2019-05-15 | End: 2019-05-19 | Stop reason: SDUPTHER

## 2019-05-15 RX ORDER — POTASSIUM CHLORIDE AND SODIUM CHLORIDE 900; 300 MG/100ML; MG/100ML
INJECTION, SOLUTION INTRAVENOUS CONTINUOUS
Status: DISCONTINUED | OUTPATIENT
Start: 2019-05-15 | End: 2019-05-15

## 2019-05-15 RX ORDER — LORAZEPAM 2 MG/ML
2 INJECTION INTRAMUSCULAR
Status: DISCONTINUED | OUTPATIENT
Start: 2019-05-15 | End: 2019-05-18

## 2019-05-15 RX ORDER — POTASSIUM CHLORIDE AND SODIUM CHLORIDE 900; 300 MG/100ML; MG/100ML
INJECTION, SOLUTION INTRAVENOUS CONTINUOUS
Status: DISCONTINUED | OUTPATIENT
Start: 2019-05-15 | End: 2019-05-16

## 2019-05-15 RX ORDER — ONDANSETRON 2 MG/ML
4 INJECTION INTRAMUSCULAR; INTRAVENOUS
Status: DISCONTINUED | OUTPATIENT
Start: 2019-05-15 | End: 2019-06-01 | Stop reason: HOSPADM

## 2019-05-15 RX ORDER — HYDROCODONE BITARTRATE AND ACETAMINOPHEN 5; 325 MG/1; MG/1
1 TABLET ORAL
Status: DISCONTINUED | OUTPATIENT
Start: 2019-05-15 | End: 2019-05-21 | Stop reason: ALTCHOICE

## 2019-05-15 RX ORDER — HEPARIN SODIUM 5000 [USP'U]/ML
5000 INJECTION, SOLUTION INTRAVENOUS; SUBCUTANEOUS EVERY 8 HOURS
Status: DISCONTINUED | OUTPATIENT
Start: 2019-05-15 | End: 2019-05-19

## 2019-05-15 RX ORDER — ONDANSETRON 2 MG/ML
INJECTION INTRAMUSCULAR; INTRAVENOUS
Status: DISPENSED
Start: 2019-05-15 | End: 2019-05-15

## 2019-05-15 RX ORDER — LORAZEPAM 2 MG/ML
1 INJECTION INTRAMUSCULAR
Status: DISCONTINUED | OUTPATIENT
Start: 2019-05-15 | End: 2019-05-15

## 2019-05-15 RX ORDER — BISACODYL 5 MG
5 TABLET, DELAYED RELEASE (ENTERIC COATED) ORAL DAILY PRN
Status: DISCONTINUED | OUTPATIENT
Start: 2019-05-15 | End: 2019-06-01 | Stop reason: HOSPADM

## 2019-05-15 RX ORDER — SODIUM CHLORIDE 0.9 % (FLUSH) 0.9 %
5-40 SYRINGE (ML) INJECTION AS NEEDED
Status: DISCONTINUED | OUTPATIENT
Start: 2019-05-15 | End: 2019-06-01 | Stop reason: HOSPADM

## 2019-05-15 RX ORDER — PROCHLORPERAZINE MALEATE 10 MG
10 TABLET ORAL
Status: DISCONTINUED | OUTPATIENT
Start: 2019-05-15 | End: 2019-06-01 | Stop reason: HOSPADM

## 2019-05-15 RX ORDER — SODIUM CHLORIDE 0.9 % (FLUSH) 0.9 %
5-40 SYRINGE (ML) INJECTION EVERY 8 HOURS
Status: DISCONTINUED | OUTPATIENT
Start: 2019-05-15 | End: 2019-06-01 | Stop reason: HOSPADM

## 2019-05-15 RX ADMIN — HEPARIN SODIUM 5000 UNITS: 5000 INJECTION INTRAVENOUS; SUBCUTANEOUS at 04:34

## 2019-05-15 RX ADMIN — SODIUM CHLORIDE 100 ML: 900 INJECTION, SOLUTION INTRAVENOUS at 01:38

## 2019-05-15 RX ADMIN — SODIUM CHLORIDE AND POTASSIUM CHLORIDE: 9; 2.98 INJECTION, SOLUTION INTRAVENOUS at 20:04

## 2019-05-15 RX ADMIN — Medication 10 ML: at 14:22

## 2019-05-15 RX ADMIN — ACETAMINOPHEN 650 MG: 325 TABLET ORAL at 02:16

## 2019-05-15 RX ADMIN — SODIUM CHLORIDE, SODIUM LACTATE, POTASSIUM CHLORIDE, AND CALCIUM CHLORIDE 150 ML/HR: 600; 310; 30; 20 INJECTION, SOLUTION INTRAVENOUS at 01:52

## 2019-05-15 RX ADMIN — CHLORASEPTIC 1 SPRAY: 1.5 LIQUID ORAL at 21:06

## 2019-05-15 RX ADMIN — SODIUM CHLORIDE 40 MG: 9 INJECTION, SOLUTION INTRAMUSCULAR; INTRAVENOUS; SUBCUTANEOUS at 08:57

## 2019-05-15 RX ADMIN — ONDANSETRON HYDROCHLORIDE 4 MG: 2 INJECTION, SOLUTION INTRAMUSCULAR; INTRAVENOUS at 03:20

## 2019-05-15 RX ADMIN — HYDROCHLOROTHIAZIDE 25 MG: 25 TABLET ORAL at 16:32

## 2019-05-15 RX ADMIN — IOPAMIDOL 100 ML: 755 INJECTION, SOLUTION INTRAVENOUS at 01:38

## 2019-05-15 RX ADMIN — MAGNESIUM SULFATE HEPTAHYDRATE 2 G: 40 INJECTION, SOLUTION INTRAVENOUS at 11:03

## 2019-05-15 RX ADMIN — ACETAMINOPHEN 650 MG: 325 TABLET ORAL at 11:03

## 2019-05-15 RX ADMIN — HYDROCODONE BITARTRATE AND ACETAMINOPHEN 1 TABLET: 5; 325 TABLET ORAL at 22:29

## 2019-05-15 RX ADMIN — Medication 10 ML: at 01:38

## 2019-05-15 RX ADMIN — Medication 10 ML: at 21:07

## 2019-05-15 RX ADMIN — HYDROCODONE BITARTRATE AND ACETAMINOPHEN 1 TABLET: 5; 325 TABLET ORAL at 16:32

## 2019-05-15 RX ADMIN — HEPARIN SODIUM 5000 UNITS: 5000 INJECTION INTRAVENOUS; SUBCUTANEOUS at 12:39

## 2019-05-15 RX ADMIN — HEPARIN SODIUM 5000 UNITS: 5000 INJECTION INTRAVENOUS; SUBCUTANEOUS at 21:05

## 2019-05-15 RX ADMIN — FOLIC ACID: 5 INJECTION, SOLUTION INTRAMUSCULAR; INTRAVENOUS; SUBCUTANEOUS at 08:58

## 2019-05-15 NOTE — H&P
[address]  HISTORY AND PHYSICAL    Name:  Katiuska Crandall  MR#:  [id number]  :  1950  ACCOUNT #:  [de-identified]  ADMIT DATE:  2019    PRIMARY CARE PHYSICIAN:  Kristie Gale MD    SOURCE OF INFORMATION:  The patient's spouse and daughter who were present at the bedside. CHIEF COMPLAINT:  Change in mental status. HISTORY OF PRESENT ILLNESS:  This is a 26-year-old man with a past medical history significant for hypertension, gastroesophageal reflux disease, Meniere's disease, who was in his usual state of health until the day of his presentation at the emergency room when the patient developed a change in mental status. The spouse stated that she was out of the house and when she came back found the patient laying prone in the backyard unresponsive. It is not clear how long the patient had been lying prone in the backyard. When the EMS arrived at the scene, it was reported that the patient was verbally responsive to the EMS crew that he had too much alcohol. The patient was administering alcohol by rectal enema stating that he wanted to try something new. The patient has no history of alcohol abuse according to his spouse, but today the patient had too much alcohol than usual.  The patient was brought to the emergency room for further evaluation. When the patient arrived at the emergency room, the patient remained lethargic, but easily arousable and also with verbal response. CT scan of the head was obtained, this was negative. The patient's alcohol level was 162. The patient was referred to the Hospitalist Service for evaluation for admission. No record of prior admission to this hospital.    PAST MEDICAL HISTORY:  Hypertension, gastroesophageal reflux disease, Meniere's disease. PAST SURGICAL HISTORY:  This is significant for tonsillectomy, excision of basal cell carcinoma from the cheek. ALLERGIES:  NO KNOWN DRUG ALLERGIES.     MEDICATIONS:  Pepcid 10 mg as needed, hydrochlorothiazide 25 mg daily. SOCIAL HISTORY:  No history of tobacco abuse. Social alcohol consumption twice a week. FAMILY HISTORY:  This was reviewed, it is not pertinent to present illness. No family history of alcohol abuse. REVIEW OF SYSTEMS:  Unable to obtain because of the patient's mental status. PHYSICAL EXAMINATION:  GENERAL APPEARANCE:  The patient appeared ill, in moderate distress. VITAL SIGNS:  On arrival at the emergency room, temperature 98.6, pulse 90, respiratory rate 20, blood pressure 135/70, oxygen saturation 98%. HEAD:  Normocephalic, atraumatic. EYES:  Unable to assess eye movement but no redness, no drainage, no discharge. EARS:  Normal external ears with no evidence of drainage. NOSE:  No deformity and no drainage. MOUTH AND THROAT:  No visible oral lesion. NECK:  Neck is supple. No JVD, no thyromegaly. CHEST:  Clear breath sounds. No wheezing, no crackles. HEART:  Normal S1 and S2, regular. No clinically appreciable murmur. ABDOMEN:  Soft, nontender, normal bowel sounds. CENTRAL NERVOUS SYSTEM:  Lethargic but arousable. No gross focal neurological deficit. EXTREMITIES:  No edema. Pulses 2+ bilaterally. MUSCULOSKELETAL SYSTEM:  No evidence of joint deformity and swelling. SKIN:  No active skin lesions seen in the exposed parts of the body. PSYCHIATRIC:  Unable to assess mood and affect. LYMPHATIC SYSTEM:  No cervical lymphadenopathy. DIAGNOSTIC DATA:  Chest x-ray:  No acute infiltrates. CT scan of the head without contrast, no acute changes. CT scan of the cervical spine, no acute changes. EKG shows normal sinus rhythm. No ST and T-wave abnormalities. LABORATORY DATA:  Hematology:  WBC 14.4, hemoglobin at 15.1, hematocrit at 47.3, platelets at 408. Serum alcohol level 162. Acetaminophen level less than 2. Salicylate level less than 1.7. Magnesium level 2.3. Lipase level 88.   Chemistry:  Sodium 142, potassium 3.9, chloride 108, CO2 26, glucose 96, BUN 13, creatinine 0.89, calcium 8.6, total bilirubin 0.5, ALT 24, AST 20, alkaline phosphatase 82, total protein 7.1, albumin level 3.8, globulin at 3.3. Lactic acid level 3.5. Urine drug screen negative. ASSESSMENT:  1. Alcohol intoxication delirium. 2.  Hypertension. 3.  Leukocytosis. 4.  Elevated lactic acid level. PLAN:  1. Alcohol intoxication delirium. We will admit the patient for further evaluation and treatment. We will start the patient on banana bag. The patient's CIWA score in the emergency room was zero according to the patient's nurse. We will place the patient on Ativan as needed. If the patient's CIWA score becomes elevated, we will initiate CIWA protocol. The patient is being admitted into IMCU for close monitoring. We will obtain MRI of the brain for further evaluation of the acute delirium. Neurology consult will also be requested to assist in the evaluation and treatment of acute delirium, most likely due to alcohol. 2.  Hypertension. We will monitor the patient's blood pressure closely. We will hold off on hydrochlorothiazide. 3.  Leukocytosis. We will check CT scan of the chest to evaluate the patient for aspiration pneumonia and other pathology. We will also obtain a CT scan of the chest to evaluate the patient for colitis. The patient is afebrile, no source of an infection. 4.  Elevated lactic acid level. This is most likely as a result of the alcohol intoxication delirium. We will carry out fluid therapy. We will trend lactic acid level. We will await the results of the CT scan of the chest, abdomen and pelvis as stated above to evaluate the patient for potential source of infection. 5.  Other issues. Code status: The patient is a full code. We will place the patient on heparin for DVT prophylaxis. FUNCTIONAL STATUS PRIOR TO ADMISSION:  The patient is ambulatory without assistance or device.       Francisco Oconnor MD [id number]  D:  05/15/2019 5:59  T:  05/15/2019 6:05  JOB #:  [id number]  CC:  Arianna Marcano MD

## 2019-05-15 NOTE — PROGRESS NOTES
Patient seen and examined bedside     Daughter is bedside     dia get xray of shoulder done given pain   There is lot of concern for weakness in arm per family , will get MRI brain to see if stroke is possible   Alcohol withdrawal protocol

## 2019-05-15 NOTE — PROGRESS NOTES
Bedside and Verbal shift change report given to LEONARDO Mack  (oncoming nurse) by Jose Douglas RN  (offgoing nurse). Report included the following information SBAR.

## 2019-05-15 NOTE — PROGRESS NOTES
Reason for Admission:  Alcohol intoxication                    RRAT Score:   5/ low                  Plan for utilizing home health:   none                       Current Advanced Directive/Advance Care Plan: has an AMD per spouse    Likelihood of Readmission:  low                         Transition of Care Plan: Pt will return home and f/u with PCP    Care Management Interventions  PCP Verified by CM: Yes(Jacob Cazares MD, last office visit was in Oct/Nov 2018)  Palliative Care Criteria Met (RRAT>21 & CHF Dx)?: No  Mode of Transport at Discharge:  Other (see comment)(spouse)  Current Support Network: Lives with Spouse([address], [phone])  Confirm Follow Up Transport: Self  Plan discussed with Pt/Family/Caregiver: Yes  Discharge Location  Discharge Placement: Ora Sharan, RN ACM CRM

## 2019-05-15 NOTE — PROGRESS NOTES
Problem: Falls - Risk of  Goal: *Absence of Falls  Description  Document Brysonne Bunting Fall Risk and appropriate interventions in the flowsheet. Outcome: Progressing Towards Goal     Problem: Pressure Injury - Risk of  Goal: *Prevention of pressure injury  Description  Document Sha Scale and appropriate interventions in the flowsheet. Outcome: Progressing Towards Goal     Problem: Alcohol Withdrawal  Goal: *STG: Remains safe in hospital  Outcome: Progressing Towards Goal       CIWA  monitored.  Pain managed with [place]  Patient resting quietly in bed, bed in lowest position, side rails X3, family at bedside   Will continue to monitor

## 2019-05-15 NOTE — PROGRESS NOTES
Problem: Pressure Injury - Risk of  Goal: *Prevention of pressure injury  Description  Document Sha Scale and appropriate interventions in the flowsheet. Outcome: Progressing Towards Goal     Problem: Patient Education: Go to Patient Education Activity  Goal: Patient/Family Education  Outcome: Progressing Towards Goal     Problem: Falls - Risk of  Goal: *Absence of Falls  Description  Document Joni Carbone Fall Risk and appropriate interventions in the flowsheet.   Outcome: Progressing Towards Goal     Problem: Patient Education: Go to Patient Education Activity  Goal: Patient/Family Education  Outcome: Progressing Towards Goal

## 2019-05-15 NOTE — PROGRESS NOTES
Called Dr. Payam Rivers to make aware of pt bp. Told to start hydrochlorothiazide. Told MD of pain - new orders for Norco q 6 . Asked about running banana bag concurrent with maintenance fluid - told to run separately.

## 2019-05-15 NOTE — ED NOTES
The documentation for this period is being entered following the guidelines as defined in the [address] [place] [address] downtime policy by Quique Fung RN.    6686: Tylenol 650 MG oral with applesauce. 4640: Lactic 3.0 Resulted Rufina. Linda aware. 0245: HR 97, /58, O2 96% RA, Temp 98.6 Oral  0300. Pt resting comfortably. 0315: 300mL UO  0320: Pt reports nausea. 4mg/2ml Zofran given.

## 2019-05-15 NOTE — PROGRESS NOTES
Patient having difficulty swallowing meds in applesauce  Speech consulted    Slurred speech, Both arms weak  MD aware.  Plan for MRI

## 2019-05-15 NOTE — ROUTINE PROCESS
TRANSFER - OUT REPORT:    Verbal report given to [facility], RN(name) on [place]  being transferred to NSTU(unit) for routine progression of care       Report consisted of patients Situation, Background, Assessment and   Recommendations(SBAR). Information from the following report(s) SBAR, ED Summary, MAR, Accordion, Recent Results and Cardiac Rhythm NSR was reviewed with the receiving nurse. Lines:   Peripheral IV 05/14/19 Left Antecubital (Active)   Site Assessment Clean, dry, & intact 5/14/2019  7:17 PM   Phlebitis Assessment 0 5/14/2019  7:17 PM   Infiltration Assessment 0 5/14/2019  7:17 PM   Dressing Status Clean, dry, & intact 5/14/2019  7:17 PM        Opportunity for questions and clarification was provided.       Patient transported with:   Monitor   Visit Vitals  /56 (BP 1 Location: Left arm, BP Patient Position: At rest)   Pulse 100   Temp 98.3 °F (36.8 °C)   Resp 21   SpO2 95%

## 2019-05-15 NOTE — PROGRESS NOTES
Bedside shift change report given to Chikis RN (oncoming nurse) by Pedro RN (offgoing nurse). Report included the following information SBAR, Kardex, Intake/Output, Recent Results and Quality Measures.

## 2019-05-15 NOTE — ED NOTES
0000: Assumed care of pt, report received from [address] [facility]. Pt moved to ED 5, placed on monitor x3, family remains at bedside    0030: Spoke with Hospitalist about pt, aware of Lactic of 3.  No further orders received at this time

## 2019-05-15 NOTE — PROGRESS NOTES
Consult received and appreciated, however order entered per protocol, which is a nursing order. SLP requires a physician's order to complete swallowing evaluation. If formal evaluation is desired, please re-consult with MD order. Thanks.    Dory Waters, SLP

## 2019-05-15 NOTE — ED PROVIDER NOTES
78 y.o. male with past medical history significant for HTN, Meniere's disease, and BCCA who presents from home via EMS with chief complaint of alcohol intoxication. Per EMS, pt's spouse found pt laying prone in the backyard, unresponsive, so she called 911. EMS reports upon their arrival, pt was verbally responsive. Per EMS, pt reports he had \"too much alcohol\" and notes administering alcohol by rectal enema stating, \"I wanted to try something new\". EMS reports they are unsure of amount of alcohol used, but according to triage note, pt endorses using \"a third of a bottle of wine\". EMS reports multiple episodes of vomiting en route to ED. There are no other acute medical concerns at this time. Full history, physical exam, and ROS unable to be obtained due to:  Pt condition. Social hx: Non smoker; EtOH use    PCP: Karen Bowman MD    Note written by Mirela Ma, as dictated by Obey Aguiar MD 6:40 PM      The history is provided by the patient, the EMS personnel and the spouse. No  was used. Past Medical History:   Diagnosis Date    Cancer ([facility] [address]     BCCA skin    Family history of skin cancer     Hypertension     Ill-defined condition     meineer's disease       Past Surgical History:   Procedure Laterality Date    COLONOSCOPY N/A 7/26/2017    COLONOSCOPY performed by Harper Spencer MD at [facility] HX HEENT      wisdom teeth extracted    HX MOHS PROCEDURES  06/20/2017    BCC R cheek by Dr. Cornejo Seek           History reviewed. No pertinent family history.     Social History     Socioeconomic History    Marital status:      Spouse name: Not on file    Number of children: Not on file    Years of education: Not on file    Highest education level: Not on file   Occupational History    Not on file   Social Needs    Financial resource strain: Not on file    Food insecurity:     Worry: Not on file     Inability: Not on file   Senzari needs:     Medical: Not on file     Non-medical: Not on file   Tobacco Use    Smoking status: Never Smoker    Smokeless tobacco: Never Used   Substance and Sexual Activity    Alcohol use: Yes     Comment: twice a week    Drug use: Not on file    Sexual activity: Not on file   Lifestyle    Physical activity:     Days per week: Not on file     Minutes per session: Not on file    Stress: Not on file   Relationships    Social connections:     Talks on phone: Not on file     Gets together: Not on file     Attends Voodoo service: Not on file     Active member of club or organization: Not on file     Attends meetings of clubs or organizations: Not on file     Relationship status: Not on file    Intimate partner violence:     Fear of current or ex partner: Not on file     Emotionally abused: Not on file     Physically abused: Not on file     Forced sexual activity: Not on file   Other Topics Concern    Not on file   Social History Narrative    Not on file         ALLERGIES: Patient has no known allergies. Review of Systems   Unable to perform ROS: Mental status change       Vitals:    05/14/19 1900 05/14/19 1930   BP: 135/70 141/75   Pulse: 90 98   Resp: 20 23   SpO2: 98% 97%            Physical Exam   Constitutional: He is oriented to person, place, and time. He appears well-developed and well-nourished. He appears distressed. Pt is intoxicated appearing with vomitus around his face. HENT:   Head: Normocephalic and atraumatic. Nose: Nose normal.   Mouth/Throat: No oropharyngeal exudate. Residual vomitus in oral pharynx, in no acute respiratory distress   Eyes: Pupils are equal, round, and reactive to light. Conjunctivae and EOM are normal. Right eye exhibits no discharge. Left eye exhibits no discharge. No scleral icterus. Neck: Normal range of motion. Neck supple. No JVD present. No tracheal deviation present. No thyromegaly present.    Cardiovascular: Normal rate, regular rhythm, normal heart sounds and intact distal pulses. Exam reveals no gallop and no friction rub. No murmur heard. Pulmonary/Chest: Effort normal and breath sounds normal. No stridor. No respiratory distress. He has no wheezes. He has no rales. He exhibits no tenderness. Abdominal: Soft. Bowel sounds are normal. He exhibits no distension and no mass. There is no tenderness. There is no rebound. Musculoskeletal: Normal range of motion. He exhibits no edema or tenderness. Lymphadenopathy:     He has no cervical adenopathy. Neurological: He is alert and oriented to person, place, and time. No cranial nerve deficit or sensory deficit. Coordination abnormal. GCS eye subscore is 4. GCS verbal subscore is 5. GCS motor subscore is 6. Skin: Skin is warm. No rash noted. He is diaphoretic. No erythema. No pallor. Psychiatric: He is slowed. Cognition and memory are impaired. Nursing note and vitals reviewed. Note written by Mirela Maldonado, as dictated by Rosy Arreola MD 6:40 PM    MDM  Number of Diagnoses or Management Options  Alcoholic intoxication with complication [facility]):   Diagnosis management comments: A/P:  ETOH intoxication, aspiration PNA, rhabdomyolsis, head injury. 77 y/o male with ETOH intoxication found unresponsive by wife in back yard in prone position. Unclear how long pt was down, however do to current weather conditions and vitals, less likely heat related illness. As this was unwitnessed will need to evaluate for head/c-spine injury. Per wife and daughter pt does not drink ETOH on regular basis and does not have hx of ETOH abuse. Cbc, cmp, lipase, mag, asa, apap, ua, ETOH, ecg, tr, ck, ct head/c-spine. IV 1 L bolus NS.    CT head/c spine negative, ETOH level 160, due to pt not having ETOH abuse hx this level could explain pt's current mental status, CK not in range for rhabdomyolsis, will continue to monitor for changes in mental status.   leukocystosis likely due to stress response instead of infection and CXR negative for aspiration. Amount and/or Complexity of Data Reviewed  Clinical lab tests: ordered and reviewed  Tests in the radiology section of CPT®: reviewed and ordered  Obtain history from someone other than the patient: yes  Review and summarize past medical records: yes  Discuss the patient with other providers: yes  Independent visualization of images, tracings, or specimens: yes    Risk of Complications, Morbidity, and/or Mortality  Presenting problems: moderate  Diagnostic procedures: moderate  Management options: moderate    Critical Care  Total time providing critical care: 30-74 minutes (Total critical care time spent exclusive of procedures:  45 min)    Patient Progress  Patient progress: improved         Procedures  ED EKG interpretation: 1655  Rhythm: normal sinus rhythm; and regular . Rate (approx.): 89 bpm; No ST/T wave abnormalities. Note written by Mirela Ma, as dictated by Obey Aguiar MD 8:44 PM    Pt still unable to ambulate and complaining of bilateral hand weakness.   Pt to be admitted and will continue to monitor neuro exam once ETOH level has resolved

## 2019-05-15 NOTE — CONSULTS
This pt is intoxicated with an alcohol level of 162 at presentation. I am not certain what the question for neurology is. Please reconsult if needed.

## 2019-05-15 NOTE — PROGRESS NOTES
Primary Nurse Valentin Vásquez RN performed a dual skin assessment on this patient No impairment noted  Sha score is 13    Skin assessment: Redness all over posterior legs, arms, and backs

## 2019-05-15 NOTE — ED NOTES
Noted low glucose reading from 0200 labs. Finger stick repeated at this time, shows 81. Attempted to have patient take a sip of apple juice via straw, pt choked and gagged on two attempts, even while in high Fowlers position. Advised wife not to give anything else by mouth and made pt NPO at this time. Primary RN made aware.

## 2019-05-15 NOTE — PROGRESS NOTES
Pt weak in bilateral arms, with slurred speech. Called Dr. Bao Lewis,  Made aware that CT of brain is normal.  New orders for MRI of the brain.

## 2019-05-15 NOTE — ED NOTES
Received bedside report in SBAR format, updated on [facility], recent results and plan of care from [facility], [facility]. Assumed care of patient.

## 2019-05-16 ENCOUNTER — APPOINTMENT (OUTPATIENT)
Dept: MRI IMAGING | Age: 69
DRG: 028 | End: 2019-05-16
Attending: INTERNAL MEDICINE
Payer: MEDICARE

## 2019-05-16 LAB
ALBUMIN SERPL-MCNC: 2.7 G/DL (ref 3.5–5)
ALBUMIN/GLOB SERPL: 1 {RATIO} (ref 1.1–2.2)
ALP SERPL-CCNC: 63 U/L (ref 45–117)
ALT SERPL-CCNC: 31 U/L (ref 12–78)
ANION GAP SERPL CALC-SCNC: 7 MMOL/L (ref 5–15)
AST SERPL-CCNC: 32 U/L (ref 15–37)
BASOPHILS # BLD: 0 K/UL (ref 0–0.1)
BASOPHILS NFR BLD: 0 % (ref 0–1)
BILIRUB SERPL-MCNC: 0.8 MG/DL (ref 0.2–1)
BUN SERPL-MCNC: 17 MG/DL (ref 6–20)
BUN/CREAT SERPL: 20 (ref 12–20)
CALCIUM SERPL-MCNC: 8.1 MG/DL (ref 8.5–10.1)
CHLORIDE SERPL-SCNC: 109 MMOL/L (ref 97–108)
CK SERPL-CCNC: 185 U/L (ref 39–308)
CO2 SERPL-SCNC: 23 MMOL/L (ref 21–32)
CREAT SERPL-MCNC: 0.85 MG/DL (ref 0.7–1.3)
DIFFERENTIAL METHOD BLD: ABNORMAL
EOSINOPHIL # BLD: 0 K/UL (ref 0–0.4)
EOSINOPHIL NFR BLD: 0 % (ref 0–7)
ERYTHROCYTE [DISTWIDTH] IN BLOOD BY AUTOMATED COUNT: 14.6 % (ref 11.5–14.5)
GLOBULIN SER CALC-MCNC: 2.8 G/DL (ref 2–4)
GLUCOSE BLD STRIP.AUTO-MCNC: 94 MG/DL (ref 65–100)
GLUCOSE BLD STRIP.AUTO-MCNC: 97 MG/DL (ref 65–100)
GLUCOSE SERPL-MCNC: 102 MG/DL (ref 65–100)
HCT VFR BLD AUTO: 40.9 % (ref 36.6–50.3)
HGB BLD-MCNC: 13.2 G/DL (ref 12.1–17)
IMM GRANULOCYTES # BLD AUTO: 0 K/UL (ref 0–0.04)
IMM GRANULOCYTES NFR BLD AUTO: 0 % (ref 0–0.5)
LYMPHOCYTES # BLD: 1.6 K/UL (ref 0.8–3.5)
LYMPHOCYTES NFR BLD: 17 % (ref 12–49)
MCH RBC QN AUTO: 29.4 PG (ref 26–34)
MCHC RBC AUTO-ENTMCNC: 32.3 G/DL (ref 30–36.5)
MCV RBC AUTO: 91.1 FL (ref 80–99)
MONOCYTES # BLD: 1.1 K/UL (ref 0–1)
MONOCYTES NFR BLD: 11 % (ref 5–13)
NEUTS SEG # BLD: 6.7 K/UL (ref 1.8–8)
NEUTS SEG NFR BLD: 72 % (ref 32–75)
NRBC # BLD: 0 K/UL (ref 0–0.01)
NRBC BLD-RTO: 0 PER 100 WBC
PLATELET # BLD AUTO: 170 K/UL (ref 150–400)
PMV BLD AUTO: 9.9 FL (ref 8.9–12.9)
POTASSIUM SERPL-SCNC: 4.1 MMOL/L (ref 3.5–5.1)
PROT SERPL-MCNC: 5.5 G/DL (ref 6.4–8.2)
RBC # BLD AUTO: 4.49 M/UL (ref 4.1–5.7)
SERVICE CMNT-IMP: NORMAL
SERVICE CMNT-IMP: NORMAL
SODIUM SERPL-SCNC: 139 MMOL/L (ref 136–145)
WBC # BLD AUTO: 9.4 K/UL (ref 4.1–11.1)

## 2019-05-16 PROCEDURE — 85025 COMPLETE CBC W/AUTO DIFF WBC: CPT

## 2019-05-16 PROCEDURE — 80053 COMPREHEN METABOLIC PANEL: CPT

## 2019-05-16 PROCEDURE — 74011250637 HC RX REV CODE- 250/637: Performed by: INTERNAL MEDICINE

## 2019-05-16 PROCEDURE — 70551 MRI BRAIN STEM W/O DYE: CPT

## 2019-05-16 PROCEDURE — 36415 COLL VENOUS BLD VENIPUNCTURE: CPT

## 2019-05-16 PROCEDURE — 77030034850

## 2019-05-16 PROCEDURE — 82962 GLUCOSE BLOOD TEST: CPT

## 2019-05-16 PROCEDURE — 74011250636 HC RX REV CODE- 250/636: Performed by: INTERNAL MEDICINE

## 2019-05-16 PROCEDURE — 82550 ASSAY OF CK (CPK): CPT

## 2019-05-16 PROCEDURE — 74011000250 HC RX REV CODE- 250: Performed by: INTERNAL MEDICINE

## 2019-05-16 PROCEDURE — C9113 INJ PANTOPRAZOLE SODIUM, VIA: HCPCS | Performed by: INTERNAL MEDICINE

## 2019-05-16 PROCEDURE — 74011250636 HC RX REV CODE- 250/636: Performed by: NURSE PRACTITIONER

## 2019-05-16 PROCEDURE — 51798 US URINE CAPACITY MEASURE: CPT

## 2019-05-16 PROCEDURE — 95816 EEG AWAKE AND DROWSY: CPT | Performed by: INTERNAL MEDICINE

## 2019-05-16 PROCEDURE — 65660000000 HC RM CCU STEPDOWN

## 2019-05-16 PROCEDURE — 92610 EVALUATE SWALLOWING FUNCTION: CPT

## 2019-05-16 RX ORDER — MORPHINE SULFATE 2 MG/ML
2 INJECTION, SOLUTION INTRAMUSCULAR; INTRAVENOUS
Status: DISCONTINUED | OUTPATIENT
Start: 2019-05-16 | End: 2019-05-21 | Stop reason: ALTCHOICE

## 2019-05-16 RX ORDER — SODIUM CHLORIDE 9 MG/ML
75 INJECTION, SOLUTION INTRAVENOUS CONTINUOUS
Status: DISCONTINUED | OUTPATIENT
Start: 2019-05-16 | End: 2019-05-19

## 2019-05-16 RX ORDER — HYDRALAZINE HYDROCHLORIDE 20 MG/ML
10 INJECTION INTRAMUSCULAR; INTRAVENOUS
Status: DISCONTINUED | OUTPATIENT
Start: 2019-05-16 | End: 2019-05-17

## 2019-05-16 RX ADMIN — HYDROCODONE BITARTRATE AND ACETAMINOPHEN 1 TABLET: 5; 325 TABLET ORAL at 04:39

## 2019-05-16 RX ADMIN — FOLIC ACID: 5 INJECTION, SOLUTION INTRAMUSCULAR; INTRAVENOUS; SUBCUTANEOUS at 08:19

## 2019-05-16 RX ADMIN — LORAZEPAM 2 MG: 2 INJECTION, SOLUTION INTRAMUSCULAR; INTRAVENOUS at 14:19

## 2019-05-16 RX ADMIN — HYDROCHLOROTHIAZIDE 25 MG: 25 TABLET ORAL at 08:13

## 2019-05-16 RX ADMIN — HEPARIN SODIUM 5000 UNITS: 5000 INJECTION INTRAVENOUS; SUBCUTANEOUS at 04:39

## 2019-05-16 RX ADMIN — HEPARIN SODIUM 5000 UNITS: 5000 INJECTION INTRAVENOUS; SUBCUTANEOUS at 12:16

## 2019-05-16 RX ADMIN — Medication 10 ML: at 21:14

## 2019-05-16 RX ADMIN — SODIUM CHLORIDE 40 MG: 9 INJECTION, SOLUTION INTRAMUSCULAR; INTRAVENOUS; SUBCUTANEOUS at 08:13

## 2019-05-16 RX ADMIN — Medication 10 ML: at 14:20

## 2019-05-16 RX ADMIN — SODIUM CHLORIDE 75 ML/HR: 900 INJECTION, SOLUTION INTRAVENOUS at 21:14

## 2019-05-16 RX ADMIN — ONDANSETRON HYDROCHLORIDE 4 MG: 2 INJECTION, SOLUTION INTRAMUSCULAR; INTRAVENOUS at 03:54

## 2019-05-16 RX ADMIN — HEPARIN SODIUM 5000 UNITS: 5000 INJECTION INTRAVENOUS; SUBCUTANEOUS at 20:41

## 2019-05-16 RX ADMIN — MORPHINE SULFATE 2 MG: 2 INJECTION, SOLUTION INTRAMUSCULAR; INTRAVENOUS at 10:59

## 2019-05-16 RX ADMIN — HYDRALAZINE HYDROCHLORIDE 10 MG: 20 INJECTION INTRAMUSCULAR; INTRAVENOUS at 17:15

## 2019-05-16 RX ADMIN — Medication 10 ML: at 06:00

## 2019-05-16 NOTE — PROGRESS NOTES
Hospitalist Progress Note  Arden Sosa MD  Answering service: [phone] from in house phone  Cell:       Date of Service:  2019  NAME:  Celestino Fuentes  :  1950  MRN:  [id number]      Admission Summary:   76-year-old man with a past medical history significant for hypertension, gastroesophageal reflux disease, Meniere's disease, who was in his usual state of health until the day of his presentation at the emergency room when the patient developed a change in mental status. The spouse stated that she was out of the house and when she came back found the patient laying prone in the backyard unresponsive. It is not clear how long the patient had been lying prone in the backyard. When the EMS arrived at the scene, it was reported that the patient was verbally responsive to the EMS crew that he had too much alcohol. The patient was administering alcohol by rectal enema stating that he wanted to try something new. The patient has no history of alcohol abuse according to his spouse, but today the patient had too much alcohol than usual.  The patient was brought to the emergency room for further evaluation. When the patient arrived at the emergency room, the patient remained lethargic, but easily arousable and also with verbal response. CT scan of the head was obtained, this was negative. The patient's alcohol level was 162. The patient was referred to the Hospitalist Service for evaluation for admission.   No record of prior admission to this hospital        Interval history / Subjective:       Patient seen bedside with extensive discussions with the daughter today    He was awake earlier and now altered after ativan   Concerns about weakness and pain in both arms including tingling   And cannot rule out stroke as well        Assessment & Plan:         Delirium and fall   Downtime of 5 hours on the floor , MRI shows hippocampus restricted diffusion , likley suggestive of seizure like activity ? Will consult neuro and complete work up of stroke including MRA brain and neck and ECHO   Also initially though delirium due to alcohol intoxication also , alcohol level of 162    EEG , cannot rule out seizure       leukocytosis and lactic acidosis on admission    likely reactive , may be had seizure , it snot SIRS , no infection     B/L upper extremity weakness and tingling in arms with neck and shoulder pain   Will get neck spine MRI , CT is normal   Spine call consult     HTN   On prn hydralazine     Alcohol intoxication and now risk of withdrawal   CIWA protocol     [place] and also is NPO per SLP       Code status:  Full   DVT prophylaxis:     Care Plan discussed with: Patient/Family  Disposition: TBD     Hospital Problems  Date Reviewed: 5/15/2019          Codes Class Noted POA    * (Principal) Alcohol intoxication delirium ([facility] ICD-10-CM: X35.796  ICD-9-CM: 291.0  5/15/2019 Yes        Alcohol intoxication ([facility] ICD-10-CM: V59.248  ICD-9-CM: 305.00  5/15/2019 Unknown                Review of Systems:   A comprehensive review of systems was negative except for that written in the HPI. Vital Signs:    Last 24hrs VS reviewed since prior progress note. Most recent are:  Visit Vitals  /78   Pulse 89   Temp 98.5 °F (36.9 °C)   Resp 19   Wt 72.5 kg (159 lb 13.3 oz)   SpO2 98%   BMI 24.30 kg/m²         Intake/Output Summary (Last 24 hours) at 5/16/2019 1828  Last data filed at 5/16/2019 1724  Gross per 24 hour   Intake 2925 ml   Output 2550 ml   Net 375 ml        Physical Examination:             Constitutional:   Sleeping    ENT:  Oral mucous moist, oropharynx benign. Neck supple,    Resp:  CTA bilaterally. No wheezing/rhonchi/rales. No accessory muscle use   CV:  Regular rhythm, normal rate, no murmurs, gallops, rubs    GI:  Soft, non distended, non tender.  normoactive bowel sounds, no hepatosplenomegaly     Musculoskeletal: No edema, warm, 2+ pulses throughout    Neurologic:  upper extremity 4/5 b/l             Data Review:    Review and/or order of clinical lab test      Labs:     Recent Labs     05/16/19  0456 05/15/19  0154   WBC 9.4 16.7*   HGB 13.2 13.4   HCT 40.9 41.7    192     Recent Labs     05/16/19  0456 05/15/19  0154 05/14/19  1852    140 142   K 4.1 3.0* 3.9   * 108 108   CO2 23 17* 26   BUN 17 15 13   CREA 0.85 0.66* 0.89   * 58* 96   CA 8.1* 7.5* 8.6   MG  --  1.6 2.3   PHOS  --  3.8  --      Recent Labs     05/16/19  0456 05/15/19  0154 05/14/19  1852   SGOT 32 53* 20   ALT 31 39 24   AP 63 73 82   TBILI 0.8 0.8 0.5   TP 5.5* 5.7* 7.1   ALB 2.7* 3.1* 3.8   GLOB 2.8 2.6 3.3   AML  --  58  --    LPSE  --  56* 88     No results for input(s): INR, PTP, APTT in the last 72 hours. No lab exists for component: INREXT   No results for input(s): FE, TIBC, PSAT, FERR in the last 72 hours. No results found for: FOL, RBCF   No results for input(s): PH, PCO2, PO2 in the last 72 hours.   Recent Labs     05/16/19  0456 05/15/19  0154 05/14/19  1852    564* 111   CKNDX  --  2.3  --    TROIQ  --  <0.05 <0.05     Lab Results   Component Value Date/Time    Cholesterol, total 129 05/15/2019 01:54 AM    HDL Cholesterol 54 05/15/2019 01:54 AM    LDL, calculated 65 05/15/2019 01:54 AM    Triglyceride 50 05/15/2019 01:54 AM    CHOL/HDL Ratio 2.4 05/15/2019 01:54 AM     Lab Results   Component Value Date/Time    Glucose (POC) 94 05/16/2019 04:45 PM    Glucose (POC) 97 05/16/2019 11:19 AM    Glucose (POC) 81 05/15/2019 06:43 AM     Lab Results   Component Value Date/Time    Color YELLOW/STRAW 05/14/2019 08:14 PM    Appearance CLEAR 05/14/2019 08:14 PM    Specific gravity 1.016 05/14/2019 08:14 PM    pH (UA) 5.5 05/14/2019 08:14 PM    Protein NEGATIVE  05/14/2019 08:14 PM    Glucose NEGATIVE  05/14/2019 08:14 PM    Ketone 15 (A) 05/14/2019 08:14 PM    Bilirubin NEGATIVE  05/14/2019 08:14 PM    Urobilinogen 0.2 05/14/2019 08:14 PM    Nitrites NEGATIVE  05/14/2019 08:14 PM    Leukocyte Esterase NEGATIVE  05/14/2019 08:14 PM         Medications Reviewed:     Current Facility-Administered Medications   Medication Dose Route Frequency    morphine injection 2 mg  2 mg IntraVENous Q6H PRN    hydrALAZINE (APRESOLINE) 20 mg/mL injection 10 mg  10 mg IntraVENous Q6H PRN    sodium chloride (NS) flush 5-40 mL  5-40 mL IntraVENous Q8H    sodium chloride (NS) flush 5-40 mL  5-40 mL IntraVENous PRN    ondansetron (ZOFRAN) injection 4 mg  4 mg IntraVENous Q4H PRN    naloxone (NARCAN) injection 0.4 mg  0.4 mg IntraVENous PRN    bisacodyl (DULCOLAX) tablet 5 mg  5 mg Oral DAILY PRN    heparin (porcine) injection 5,000 Units  5,000 Units SubCUTAneous Q8H    pantoprazole (PROTONIX) 40 mg in sodium chloride 0.9% 10 mL injection  40 mg IntraVENous DAILY    lactated Ringers 8,106 mL with folic acid 1 mg, thiamine 100 mg, mvi, adult no. 4 with vit K 10 mL infusion   IntraVENous DAILY    acetaminophen (TYLENOL) tablet 650 mg  650 mg Oral Q4H PRN    lidocaine (LIDODERM) 5 % patch 1 Patch  1 Patch TransDERmal Q24H    LORazepam (ATIVAN) injection 2 mg  2 mg IntraVENous Q1H PRN    LORazepam (ATIVAN) injection 4 mg  4 mg IntraVENous Q1H PRN    prochlorperazine (COMPAZINE) tablet 10 mg  10 mg Oral Q6H PRN    HYDROcodone-acetaminophen (NORCO) 5-325 mg per tablet 1 Tab  1 Tab Oral Q6H PRN    hydroCHLOROthiazide (HYDRODIURIL) tablet 25 mg  25 mg Oral DAILY    phenol throat spray (CHLORASEPTIC) 1 Spray  1 Spray Oral PRN     ______________________________________________________________________  EXPECTED LENGTH OF STAY: 3d 9h  ACTUAL LENGTH OF STAY:          1                 Estefany [place] MD

## 2019-05-16 NOTE — ADT AUTH CERT NOTES
Per message from nurse: The MRI is pending. No attending note for today yet. I did a note review with what I had. Dr. Stephani Krishna looked at the chart today and wants to leave him IP for now. Thanks, Xiomy Arango     Patient Demographics     Patient Name  Malgorzata Sanchez  [id number] Sex  Male   1950 Address  [address] [place] [id number] Phone  [phone] (Home)   CSN:   [id number]   Admit Date: Admit Time Room Bed   May 14, 2019  6:33  [[id number]] 11 [[id number]]   Attending Providers     Provider Pager From To   Chau Reyez MD  05/14/19 05/15/19   Melchor Erickson MD  05/15/19 05/15/19   Obey Barrera MD  05/15/19    Emergency Contact(s)     Name Relation Home Work [address] [employer] Spouse [phone]     Utilization Reviews         [employer] Clinical Request by Ford Weiss RN         Review Entered Review Status   2019 13:01 In Primary       Criteria Review   19-     VS:  99.5- P- 76- R- 16- 161/79. O2 sat- 92% on r/a.     CIWA Score= 3.      Abnormal Labs:  RDW= 14.6. Abs. Monocytes= 1.1. Chl= 109. Glucose= 102. Ca= 8.1.  t. protein= 5.5. Albumin= 2.7. Globulin= 2.8.       Brain MRI> pending. C-spine MRI > pending.     SLP Consult:  Based on the objective data described below, the patient presents with severe pharyngeal dysphagia characterized by mildly delayed swallow initiation, decreased laryngeal elevation, and 4-5 swallows per bolus alternating with throat clearing/coughing. Question etiology of severe pharyngeal dysphagia, however note MRI brain and cervical spine ordered. Will re-evaluate swallow function tomorrow. Patient will benefit from skilled intervention to address the above impairments.   Patient's rehabilitation potential is considered to be Fair  PLAN :  Recommendations and Planned Interventions:  --Strict NPO  --Non-oral route for medications  --Frequent oral care  --SLP to re-evaluate swallowing tomorrow  --Consider PT/OT consults when medically appropriate given weakness  Frequency/Duration: Patient will be followed by speech-language pathology 4 times a week to address goals.     Orders:  Telemetry. Seizure precautions. Spot check oximetry. Routine VS.  CIWA monitoring. SLP consult. CL diet. Full code. Brain MRI. C-spine MRI. Continuous VS.   NPO.       Heparin 5,000 units SQ Q 8 hours. HCTZ 25 mg po daily. LR + MVI/folic acid/thiamin/Vitamin K at 150 ml/hr daily. Lidocaine patch. Protonix 40 mg iv daily. PO Tylenol prn. Norco 1 po Q 6 prn. Ativan 2-4 mg iv Q 1 hour prn. Morphine 2 mg iv Q 6 prn> rec'd x 1 so far today. Compazine 10 mg po Q 6 prn. Zofran 4 mg iv Q 4 prn> rec'd x 1 so far today.

## 2019-05-16 NOTE — PROGRESS NOTES
Problem: Falls - Risk of  Goal: *Absence of Falls  Description  Document Molly Joshi Fall Risk and appropriate interventions in the flowsheet. Outcome: Progressing Towards Goal  Note:   Fall Risk Interventions:            Medication Interventions: Evaluate medications/consider consulting pharmacy, Teach patient to arise slowly, Bed/chair exit alarm    Elimination Interventions: Call light in reach, Patient to call for help with toileting needs, Urinal in reach    History of Falls Interventions: Bed/chair exit alarm, Door open when patient unattended         Problem: Alcohol Withdrawal  Goal: *STG: Participates in treatment plan  Outcome: Progressing Towards Goal  Note:   Receiving IV supplements to help flush out body toxins.  See [facility]

## 2019-05-16 NOTE — PROGRESS NOTES
Problem: Pressure Injury - Risk of  Goal: *Prevention of pressure injury  Description  Document Sha Scale and appropriate interventions in the flowsheet. Outcome: Progressing Towards Goal     Problem: Falls - Risk of  Goal: *Absence of Falls  Description  Document Reymundo Reas Fall Risk and appropriate interventions in the flowsheet.   Outcome: Progressing Towards Goal

## 2019-05-16 NOTE — PROGRESS NOTES
Problem: Dysphagia (Adult)  Goal: *Acute Goals and Plan of Care (Insert Text)  Description  Speech pathology goals  Initiated 5/16/2019  1. Patient will participate in re-evaluation of swallow function within 7 days   Outcome: Progressing Towards [address] [place] [address] EVALUATION  Patient: Leanne Minor (27 y.o. male)  Date: 5/16/2019  Primary Diagnosis: Alcohol intoxication ([facility] [F10.929]        Precautions: swallow       ASSESSMENT :  Based on the objective data described below, the patient presents with severe pharyngeal dysphagia characterized by mildly delayed swallow initiation, decreased laryngeal elevation, and 4-5 swallows per bolus alternating with throat clearing/coughing. Question etiology of severe pharyngeal dysphagia, however note MRI brain and cervical spine ordered. Will re-evaluate swallow function tomorrow. Patient will benefit from skilled intervention to address the above impairments. Patients rehabilitation potential is considered to be Fair  Factors which may influence rehabilitation potential include:   ? None noted  ? Mental ability/status  ? Medical condition  ? Home/family situation and support systems  ? Safety awareness  ? Pain tolerance/management  ? Other:      PLAN :  Recommendations and Planned Interventions:  --Strict NPO  --Non-oral route for medications  --Frequent oral care  --SLP to re-evaluate swallowing tomorrow  --Consider PT/OT consults when medically appropriate given weakness  Frequency/Duration: Patient will be followed by speech-language pathology 4 times a week to address goals. Discharge Recommendations: To Be Determined     SUBJECTIVE:   Patient stated Can I have some ice?  Patient alert, appropriate, cooperative. Oriented x4. Vocal quality hoarse, raspy. Patient with bilateral UE weakness.     OBJECTIVE:     Past Medical History:   Diagnosis Date  [facility]     BCCA skin    Family history of skin cancer     Hypertension     Ill-defined condition     meineer's disease     Past Surgical History:   Procedure Laterality Date    COLONOSCOPY N/A 7/26/2017    COLONOSCOPY performed by Elina William MD at [address] HX HEENT      wisdom teeth extracted    HX MOHS PROCEDURES  06/20/2017    BCC R cheek by Dr. Florin Herrera       Prior Level of Function/Home Situation:      Diet prior to admission: regular/thin  Current Diet:  clear liquid   Cognitive and Communication Status:  Neurologic State: Alert, Appropriate for age  Orientation Level: Oriented X4  Cognition: Follows commands, Appropriate for age attention/concentration  Perception: Appears intact  Perseveration: No perseveration noted  Safety/Judgement: Awareness of environment, Insight into deficits  Oral Assessment:  Oral Assessment  Labial: No impairment  Dentition: Natural;Full  Oral Hygiene: moist oral mucosa  Lingual: No impairment  Velum: No impairment  Mandible: No impairment  P.O. Trials:  Patient Position: upright in bed  Vocal quality prior to P.O.: Hoarse;Strain;Phonation breaks  Consistency Presented: Ice chips; Thin liquid;Puree  How Presented: SLP-fed/presented;Spoon;Straw     Bolus Acceptance: No impairment  Bolus Formation/Control: No impairment     Propulsion: No impairment  Oral Residue: None  Initiation of Swallow: Delayed (# of seconds)  Laryngeal Elevation: Decreased  Aspiration Signs/Symptoms: Strong cough;Clear throat  Pharyngeal Phase Characteristics: Multiple swallows; Suspected pharyngeal residue(4-5 swallows per bolus, alternating with throat clearing)  Effective Modifications: None  Cues for Modifications: None       Oral Phase Severity: No impairment  Pharyngeal Phase Severity : Severe    NOMS:   The NOMS functional outcome measure was used to quantify this patient's level of swallowing impairment.   Based on the NOMS, the patient was determined to be at level 1 for swallow function       NOMS Swallowing Levels:  Level 1 (CN): NPO  Level 2 (CM): NPO but takes consistency in therapy  Level 3 (CL): Takes less than 50% of nutrition p.o. and continues with nonoral feedings; and/or safe with mod cues; and/or max diet restriction  Level 4 (CK): Safe swallow but needs mod cues; and/or mod diet restriction; and/or still requires some nonoral feeding/supplements  Level 5 (CJ): Safe swallow with min diet restriction; and/or needs min cues  Level 6 (CI): Independent with p.o.; rare cues; usually self cues; may need to avoid some foods or needs extra time  Level 7 ([address] [place] [address]): Independent for all p.o.  SHANELLE. (2003). National Outcomes Measurement System (NOMS): Adult Speech-Language Pathology User's Guide. Pain:  Pain Scale 1: Numeric (0 - 10)  Pain Intensity 1: 9     After treatment:   ?            Patient left in no apparent distress sitting up in chair  ? Patient left in no apparent distress in bed  ? Call bell left within reach  ? Nursing notified  ? Caregiver present  ? Bed alarm activated    COMMUNICATION/EDUCATION:   The patients plan of care including recommendations, planned interventions, and recommended diet changes were discussed with: Registered Nurse. Patient was educated regarding His deficit(s) of dysphagia as this relates to His diagnosis of ?? .  He demonstrated Good understanding as evidenced by verbalizing understanding. Family also verbalized understanding. ? Patient/family have participated as able in goal setting and plan of care. ?            Patient/family agree to work toward stated goals and plan of care. ?            Patient understands intent and goals of therapy, but is neutral about his/her participation. ? Patient is unable to participate in goal setting and plan of care.     Thank you for this referral.  Sandra Chavez, SLP  Time Calculation: 17 mins

## 2019-05-16 NOTE — CDMP QUERY
Patient admitted with alcohol intoxication and noted to have leukocytosis, tachypnea, tachycardia- SIRS criteria (temp, HR, RR, BP,WBC). If possible, please document in progress notes and d/c summary if you are evaluating and/or treating any of the following: 
 
 
=> SIRS of non-infectious origin w/o acute organ dysfunction POA 
=> SIRS of non-infectious origin w/ acute organ dysfunction POA 
=> Other Explanation of clinical findings 
=> Clinically Undetermined (no explanation for clinical findings) The medical record reflects the following: 
 
   Risk Factors: alcohol intoxication Clinical Indicators: WBC range 14.4- 16.7  HR > 90  Resp range 18- 28  H&P- Alcohol intoxication delirium. Leukocytosis. We will check CT scan of the chest to evaluate the patient for aspiration pneumonia and other pathology. Elevated lactic acid level. Treatment: fluid bolus, IVFs, lab monitoring Thank you, Yolanda LEONARDO Cancino 
[facility] [address] 
[phone]

## 2019-05-16 NOTE — PROGRESS NOTES
Bedside shift change report given to [address] [place] [address] (oncoming nurse) by Miki Corral (offgoing nurse). Report included the following information SBAR, Kardex, Intake/Output, Recent Results and Quality Measures.

## 2019-05-16 NOTE — PROGRESS NOTES
Problem: Falls - Risk of  Goal: *Absence of Falls  Description  Document Abram Brown Fall Risk and appropriate interventions in the flowsheet. Outcome: Progressing Towards Goal    1609: pt. /80, Dr. Dion Johnson notified, ordered to give IV hydralazine. IV hydralazine given to pt.      shift change report given to Renetta Umaña RN (oncoming nurse) by Keegan Alarcon RN (offgoing nurse). Report included the following information SBAR, Kardex, ED Summary, Procedure Summary, Intake/Output, MAR, Recent Results, Med Rec Status, Cardiac Rhythm ciwa and Quality Measures.

## 2019-05-16 NOTE — PROGRESS NOTES
Spiritual Care Partner Volunteer visited patient in room 412 on 5.16.19. Documented by: : Rev. Haven Vizcaino.  Cayden Piedra; [facility], to contact [phone] [address] call: [phone]

## 2019-05-17 ENCOUNTER — APPOINTMENT (OUTPATIENT)
Dept: MRI IMAGING | Age: 69
DRG: 028 | End: 2019-05-17
Attending: INTERNAL MEDICINE
Payer: MEDICARE

## 2019-05-17 ENCOUNTER — APPOINTMENT (OUTPATIENT)
Dept: NON INVASIVE DIAGNOSTICS | Age: 69
DRG: 028 | End: 2019-05-17
Attending: INTERNAL MEDICINE
Payer: MEDICARE

## 2019-05-17 LAB
ALBUMIN SERPL-MCNC: 2.9 G/DL (ref 3.5–5)
ALBUMIN/GLOB SERPL: 0.9 {RATIO} (ref 1.1–2.2)
ALP SERPL-CCNC: 65 U/L (ref 45–117)
ALT SERPL-CCNC: 30 U/L (ref 12–78)
ANION GAP SERPL CALC-SCNC: 7 MMOL/L (ref 5–15)
AST SERPL-CCNC: 31 U/L (ref 15–37)
BASOPHILS # BLD: 0 K/UL (ref 0–0.1)
BASOPHILS NFR BLD: 0 % (ref 0–1)
BILIRUB SERPL-MCNC: 0.8 MG/DL (ref 0.2–1)
BUN SERPL-MCNC: 11 MG/DL (ref 6–20)
BUN/CREAT SERPL: 15 (ref 12–20)
CALCIUM SERPL-MCNC: 8.2 MG/DL (ref 8.5–10.1)
CHLORIDE SERPL-SCNC: 104 MMOL/L (ref 97–108)
CO2 SERPL-SCNC: 28 MMOL/L (ref 21–32)
CREAT SERPL-MCNC: 0.72 MG/DL (ref 0.7–1.3)
DIFFERENTIAL METHOD BLD: NORMAL
EOSINOPHIL # BLD: 0 K/UL (ref 0–0.4)
EOSINOPHIL NFR BLD: 1 % (ref 0–7)
ERYTHROCYTE [DISTWIDTH] IN BLOOD BY AUTOMATED COUNT: 14.3 % (ref 11.5–14.5)
GLOBULIN SER CALC-MCNC: 3.1 G/DL (ref 2–4)
GLUCOSE BLD STRIP.AUTO-MCNC: 114 MG/DL (ref 65–100)
GLUCOSE BLD STRIP.AUTO-MCNC: 82 MG/DL (ref 65–100)
GLUCOSE BLD STRIP.AUTO-MCNC: 88 MG/DL (ref 65–100)
GLUCOSE SERPL-MCNC: 94 MG/DL (ref 65–100)
HCT VFR BLD AUTO: 43.1 % (ref 36.6–50.3)
HGB BLD-MCNC: 13.6 G/DL (ref 12.1–17)
IMM GRANULOCYTES # BLD AUTO: 0 K/UL (ref 0–0.04)
IMM GRANULOCYTES NFR BLD AUTO: 0 % (ref 0–0.5)
LYMPHOCYTES # BLD: 1.3 K/UL (ref 0.8–3.5)
LYMPHOCYTES NFR BLD: 18 % (ref 12–49)
MAGNESIUM SERPL-MCNC: 2.1 MG/DL (ref 1.6–2.4)
MCH RBC QN AUTO: 28.6 PG (ref 26–34)
MCHC RBC AUTO-ENTMCNC: 31.6 G/DL (ref 30–36.5)
MCV RBC AUTO: 90.7 FL (ref 80–99)
MONOCYTES # BLD: 0.9 K/UL (ref 0–1)
MONOCYTES NFR BLD: 13 % (ref 5–13)
NEUTS SEG # BLD: 5 K/UL (ref 1.8–8)
NEUTS SEG NFR BLD: 68 % (ref 32–75)
NRBC # BLD: 0 K/UL (ref 0–0.01)
NRBC BLD-RTO: 0 PER 100 WBC
PHOSPHATE SERPL-MCNC: 2 MG/DL (ref 2.6–4.7)
PLATELET # BLD AUTO: 156 K/UL (ref 150–400)
PMV BLD AUTO: 10 FL (ref 8.9–12.9)
POTASSIUM SERPL-SCNC: 3.7 MMOL/L (ref 3.5–5.1)
PROT SERPL-MCNC: 6 G/DL (ref 6.4–8.2)
RBC # BLD AUTO: 4.75 M/UL (ref 4.1–5.7)
SERVICE CMNT-IMP: ABNORMAL
SERVICE CMNT-IMP: NORMAL
SERVICE CMNT-IMP: NORMAL
SODIUM SERPL-SCNC: 139 MMOL/L (ref 136–145)
WBC # BLD AUTO: 7.4 K/UL (ref 4.1–11.1)

## 2019-05-17 PROCEDURE — 74011250636 HC RX REV CODE- 250/636: Performed by: NURSE PRACTITIONER

## 2019-05-17 PROCEDURE — 74011000258 HC RX REV CODE- 258: Performed by: PSYCHIATRY & NEUROLOGY

## 2019-05-17 PROCEDURE — C9113 INJ PANTOPRAZOLE SODIUM, VIA: HCPCS | Performed by: INTERNAL MEDICINE

## 2019-05-17 PROCEDURE — 94760 N-INVAS EAR/PLS OXIMETRY 1: CPT

## 2019-05-17 PROCEDURE — 92526 ORAL FUNCTION THERAPY: CPT | Performed by: SPEECH-LANGUAGE PATHOLOGIST

## 2019-05-17 PROCEDURE — 85025 COMPLETE CBC W/AUTO DIFF WBC: CPT

## 2019-05-17 PROCEDURE — 80053 COMPREHEN METABOLIC PANEL: CPT

## 2019-05-17 PROCEDURE — 84100 ASSAY OF PHOSPHORUS: CPT

## 2019-05-17 PROCEDURE — 74011000250 HC RX REV CODE- 250: Performed by: INTERNAL MEDICINE

## 2019-05-17 PROCEDURE — 74011250636 HC RX REV CODE- 250/636: Performed by: PSYCHIATRY & NEUROLOGY

## 2019-05-17 PROCEDURE — 93306 TTE W/DOPPLER COMPLETE: CPT

## 2019-05-17 PROCEDURE — 82962 GLUCOSE BLOOD TEST: CPT

## 2019-05-17 PROCEDURE — 70544 MR ANGIOGRAPHY HEAD W/O DYE: CPT

## 2019-05-17 PROCEDURE — 72141 MRI NECK SPINE W/O DYE: CPT

## 2019-05-17 PROCEDURE — 74011250636 HC RX REV CODE- 250/636: Performed by: INTERNAL MEDICINE

## 2019-05-17 PROCEDURE — 83735 ASSAY OF MAGNESIUM: CPT

## 2019-05-17 PROCEDURE — 65660000000 HC RM CCU STEPDOWN

## 2019-05-17 PROCEDURE — 70547 MR ANGIOGRAPHY NECK W/O DYE: CPT

## 2019-05-17 PROCEDURE — 74011250637 HC RX REV CODE- 250/637: Performed by: INTERNAL MEDICINE

## 2019-05-17 PROCEDURE — 36415 COLL VENOUS BLD VENIPUNCTURE: CPT

## 2019-05-17 RX ORDER — METOPROLOL TARTRATE 5 MG/5ML
2.5 INJECTION INTRAVENOUS
Status: DISCONTINUED | OUTPATIENT
Start: 2019-05-17 | End: 2019-06-01 | Stop reason: HOSPADM

## 2019-05-17 RX ORDER — HYDRALAZINE HYDROCHLORIDE 20 MG/ML
10 INJECTION INTRAMUSCULAR; INTRAVENOUS
Status: DISCONTINUED | OUTPATIENT
Start: 2019-05-17 | End: 2019-06-01 | Stop reason: HOSPADM

## 2019-05-17 RX ORDER — DEXAMETHASONE SODIUM PHOSPHATE 10 MG/ML
10 INJECTION INTRAMUSCULAR; INTRAVENOUS ONCE
Status: COMPLETED | OUTPATIENT
Start: 2019-05-17 | End: 2019-05-17

## 2019-05-17 RX ORDER — DEXAMETHASONE SODIUM PHOSPHATE 10 MG/ML
6 INJECTION INTRAMUSCULAR; INTRAVENOUS EVERY 6 HOURS
Status: COMPLETED | OUTPATIENT
Start: 2019-05-17 | End: 2019-05-20

## 2019-05-17 RX ADMIN — DEXAMETHASONE SODIUM PHOSPHATE 10 MG: 10 INJECTION INTRAMUSCULAR; INTRAVENOUS at 15:03

## 2019-05-17 RX ADMIN — HEPARIN SODIUM 5000 UNITS: 5000 INJECTION INTRAVENOUS; SUBCUTANEOUS at 04:58

## 2019-05-17 RX ADMIN — SODIUM CHLORIDE 500 MG: 900 INJECTION, SOLUTION INTRAVENOUS at 15:03

## 2019-05-17 RX ADMIN — HYDROCODONE BITARTRATE AND ACETAMINOPHEN 1 TABLET: 5; 325 TABLET ORAL at 21:18

## 2019-05-17 RX ADMIN — MORPHINE SULFATE 2 MG: 2 INJECTION, SOLUTION INTRAMUSCULAR; INTRAVENOUS at 06:02

## 2019-05-17 RX ADMIN — HYDRALAZINE HYDROCHLORIDE 10 MG: 20 INJECTION INTRAMUSCULAR; INTRAVENOUS at 20:01

## 2019-05-17 RX ADMIN — SODIUM CHLORIDE 40 MG: 9 INJECTION, SOLUTION INTRAMUSCULAR; INTRAVENOUS; SUBCUTANEOUS at 08:15

## 2019-05-17 RX ADMIN — DEXAMETHASONE SODIUM PHOSPHATE 6 MG: 10 INJECTION INTRAMUSCULAR; INTRAVENOUS at 21:04

## 2019-05-17 RX ADMIN — ONDANSETRON HYDROCHLORIDE 4 MG: 2 INJECTION, SOLUTION INTRAMUSCULAR; INTRAVENOUS at 10:48

## 2019-05-17 RX ADMIN — MORPHINE SULFATE 2 MG: 2 INJECTION, SOLUTION INTRAMUSCULAR; INTRAVENOUS at 10:48

## 2019-05-17 RX ADMIN — HYDRALAZINE HYDROCHLORIDE 10 MG: 20 INJECTION INTRAMUSCULAR; INTRAVENOUS at 04:58

## 2019-05-17 RX ADMIN — FOLIC ACID: 5 INJECTION, SOLUTION INTRAMUSCULAR; INTRAVENOUS; SUBCUTANEOUS at 09:43

## 2019-05-17 RX ADMIN — Medication 10 ML: at 13:50

## 2019-05-17 RX ADMIN — HEPARIN SODIUM 5000 UNITS: 5000 INJECTION INTRAVENOUS; SUBCUTANEOUS at 13:49

## 2019-05-17 RX ADMIN — SODIUM CHLORIDE 75 ML/HR: 900 INJECTION, SOLUTION INTRAVENOUS at 09:43

## 2019-05-17 RX ADMIN — Medication 10 ML: at 05:00

## 2019-05-17 RX ADMIN — HEPARIN SODIUM 5000 UNITS: 5000 INJECTION INTRAVENOUS; SUBCUTANEOUS at 21:03

## 2019-05-17 NOTE — PROGRESS NOTES
Problem: Dysphagia (Adult)  Goal: *Acute Goals and Plan of Care (Insert Text)  Description  Speech pathology goals  Initiated 5/16/2019  1. Patient will participate in re-evaluation of swallow function within 7 days   Outcome: [address] TREATMENT  Patient: Jesus Herr (77 y.o. male)  Date: 5/17/2019  Diagnosis: Alcohol intoxication ([facility] [F10.929] Alcohol intoxication delirium ([facility]       Precautions:      ASSESSMENT:  Paitent with significant improvement in swallow compared to yesterday. Given severity of problem yesterday, and suspected pharyngeal residue, mild dysphagia with liquids today, do not feel patient is ready to advance to full PO, but has shown improvement. Recommend full liquid diet as I discussed puree vs full liquid with patient and he would prefer full liquid, he can switch to puree at any time if he desires. Will follow up after neck surgery to determine course forward. He would benefit from an MBS to better assess this, but since surgery may be planned soon and this may change swallow, we can wait until afterwards. Progression toward goals:  ?         Improving appropriately and progressing toward goals  ? Improving slowly and progressing toward goals  ? Not making progress toward goals and plan of care will be adjusted     PLAN:  Recommendations and Planned Interventions:  Full liquid diet including thin liquids  Sit up straight, small bited and sips  Straws okay     MBS at a later date as medical course progresses/after surgery. Patient continues to benefit from skilled intervention to address the above impairments. Continue treatment per established plan of care. Discharge Recommendations: To Be Determined     SUBJECTIVE:   Patient stated don't faceplant in the garden, I don't recommend it? .    OBJECTIVE:   Cognitive and Communication Status:  Neurologic State: Alert  Orientation Level: Oriented X4  Cognition: Follows commands  Perception: Appears intact  Perseveration: No perseveration noted  Safety/Judgement: Awareness of environment  Dysphagia Treatment:  Oral Assessment:  Oral Assessment  Labial: No impairment  Dentition: Natural;Full  Oral Hygiene: moist, slight tan coating in back   Lingual: No impairment(wife reported difficulty protruding tongue this morning)  Mandible: No impairment  P.O. Trials:     Vocal quality prior to P.O.: Hoarse;Phonation breaks  Consistency Presented: Thin liquid;Puree; Solid; Ice chips  How Presented: SLP-fed/presented;Spoon;Straw;Cup/sip     Bolus Acceptance: No impairment  Bolus Formation/Control: No impairment     Propulsion: No impairment  Oral Residue: None  Initiation of Swallow: Delayed (# of seconds)  Laryngeal Elevation: Functional  Aspiration Signs/Symptoms: (throat clear at times)  Pharyngeal Phase Characteristics: (2 swallows needed for all puree bites, but never more)  Effective Modifications: None  Cues for Modifications: None       Oral Phase Severity: No impairment  Pharyngeal Phase Severity : Mild                                               Pain:  Pain Scale 1: Numeric (0 - 10)  Pain Intensity 1: 0  Pain Location 1: Shoulder  After treatment:   ?              Patient left in no apparent distress sitting up in chair  ? Patient left in no apparent distress in bed  ? Call bell left within reach  ? Nursing notified  ? Caregiver present  ? Bed alarm activated    COMMUNICATION/EDUCATION:     The patient?s plan of care including recommendations, planned interventions, and recommended diet changes were discussed with: Registered Nurse and Physician. ? Posted safety precautions in patient's room.     REMEDIOS Chaudhary  Time Calculation: 40 mins

## 2019-05-17 NOTE — PROGRESS NOTES
Problem: Pressure Injury - Risk of  Goal: *Prevention of pressure injury  Description  Document Sha Scale and appropriate interventions in the flowsheet.   Outcome: Progressing Towards Goal     Problem: Alcohol Withdrawal  Goal: *STG: Remains safe in hospital  Outcome: Progressing Towards Goal     CIWA monitored, pain managed with morphine   Will continue to monitor

## 2019-05-17 NOTE — CONSULTS
Consult dictated. Status post seizure-possibly alcohol intoxication related, MRI changes likely from same. Will empirically cover with keppra for now given MRI abnormality. He has central cord syndrome from disc herniation. D/W Dr. Alma Ordonez. Will start steroid and will need decompression at some point.   Lucy Warren MD

## 2019-05-17 NOTE — PROGRESS NOTES
Problem: Alcohol Withdrawal  Goal: *STG: Remains safe in hospital  Outcome: Progressing Towards Goal  Note:   Pt bed alarm on. Assess CIWA Q shift. Problem: Patient Education: Go to Patient Education Activity  Goal: Patient/Family Education  Outcome: Progressing Towards Goal  Note:   Pt remains NPO per speech consultation. Problem: Falls - Risk of  Goal: *Absence of Falls  Description  Document Shelby Cooney Fall Risk and appropriate interventions in the flowsheet.   Outcome: Progressing Towards Goal  Note:   Fall Risk Interventions:  Mobility Interventions: Communicate number of staff needed for ambulation/transfer, Bed/chair exit alarm, Patient to call before getting OOB         Medication Interventions: Bed/chair exit alarm, Evaluate medications/consider consulting pharmacy, Patient to call before getting OOB    Elimination Interventions: Bed/chair exit alarm, Call light in reach, Patient to call for help with toileting needs, Stay With Me (per policy)    History of Falls Interventions: Bed/chair exit alarm, Door open when patient unattended, Assess for delayed presentation/identification of injury for 48 hrs (comment for end date)

## 2019-05-17 NOTE — PROGRESS NOTES
Order received and chart reviewed. Pt with a central cord syndrome s/p fall. Per neurology, Dr. Ubaldo Saldivar, hold any activity at this time until neuro surgery decides if surgery is needed. Please place new activity orders when appropriate. Thanks!

## 2019-05-17 NOTE — PROGRESS NOTES
Bedside shift change report given to [address] [place] [address] (oncoming nurse) by Abhi Joe (offgoing nurse). Report included the following information SBAR, Procedure Summary, Intake/Output, MAR and Recent Results.

## 2019-05-17 NOTE — ADT AUTH CERT NOTES
Patient Demographics     Patient Name  Rufina Esquivel  [id number] Sex  Male   1950 Address  [address] [place], [place] [address] Phone  [phone] (Home)   CSN:   [id number]   Admit Date: Admit Time Room Bed   May 14, 2019  6:33  [[id number]] 00 [[id number]]   Attending Providers     Provider Pager From To   Chong Rodriges MD  05/14/19 05/15/19   Jameson Levy MD  05/15/19 05/15/19   Javid Davis MD  05/15/19    Emergency Contact(s)     Name Relation Home Work [address] Spouse [phone]     Utilization Reviews         [employer] Clinical Request by Ishmael Gonzalez RN         Review Entered Review Status   2019 13:39 In Primary       Criteria Review   Results from 19:     CIWA Score= 3.      MRA Brain=  No major vessel occlusion or intracranial aneurysm. Moderate stenosis of the P1 segment on the left.     MRA Neck=  No flow-limiting stenosis.     C-spine MRI=  Cord compression and abnormal intramedullary signal at C3-4, due to central disc osteophyte complex. Mild prevertebral edema at this level suggests this is likely related to acute disc herniation, correlation is recommended. Underlying cord lesion felt to be less likely though follow-up is recommended. No evidence of cord hemorrhage.       Delirium - Care Day 2 (2019) by Ishmael Gonzalez RN         Review Entered Review Status   2019 13:35 Completed       Criteria Review      Care Day: 2 Care Date: 2019 Level of Care:    Guideline Day 2    Clinical Status   ( ) * Delirium resolving or of known treatable cause    2019 13:35:31 EDT by Ishmael Gonzalez    Sleeping.       (X) * No dangerous behavior[L]for at least 24 hours    2019 13:35:31 EDT by Ishmael Gonzalez    None noted. Interventions   ( ) * Close observation at reduced intensity    ( ) Oral hydration    2019 13:35:31 EDT by Ishmael Gonzalez    NPO.           Medications   (X) Medication review, adjustment    5/17/2019 13:35:31 EDT by Darwin Mccormack    NS at 75 ml/hr. Heparin 5,000 units SQ Q 8 hours. HCTZ 25 mg po daily. LR + MVI/folic acid/thiamin/Vitamin K at 150 ml/hr daily. Lidocaine patch. Protonix 40 mg iv daily. PO Tylenol prn. Norco 1 po Q 6 prn. Evaluation   ( ) * Evaluation completed and reviewed                * Milestone      Additional Notes   5/16/19-      VS:  98.8> 99.5- P- 93- R- 19- 170/80.  O2 sat= 98% on r/a. CIWA Score= 3. Abnormal Labs:   RDW= 14.6.  Abs. Monocytes= 1.1. Chl= 109.  Glucose= 102.  Ca= 8.1.  t. protein= 5.5.  Albumin= 2.7.  Globulin= 2.8.        Brain MRI= No major vessel occlusion or intracranial aneurysm. Moderate stenosis of the P1 segment on the left. Attending Note:   He was awake earlier and now altered after ativan    Concerns about weakness and pain in both arms including tingling    And cannot rule out stroke as well    Exam:   Constitutional:  Sleeping    ENT:Oral mucous moist, oropharynx benign. Neck supple,    Resp: CTA bilaterally. No wheezing/rhonchi/rales. No accessory muscle use   CV: Regular rhythm, normal rate, no murmurs, gallops, rubs    GI: Soft, non distended, non tender. normoactive bowel sounds, no hepatosplenomegaly     Musculoskeletal: No edema, warm, 2+ pulses throughout    Neurologic: upper extremity 4/5 b/l       Assessment/Plan:   Delirium and fall    Downtime of 5 hours on the floor , MRI shows hippocampus restricted diffusion , likley suggestive of seizure like activity ?  Will consult neuro and complete work up of stroke including MRA brain and neck and ECHO    Also initially though delirium due to alcohol intoxication also , alcohol level of 162     EEG , cannot rule out seizure            leukocytosis and lactic acidosis on admission     likely reactive , may be had seizure , it snot SIRS , no infection        B/L upper extremity weakness and tingling in arms with neck and shoulder pain    Will get neck spine MRI , CT is normal    Spine call consult        HTN    On prn hydralazine        Alcohol intoxication and now risk of withdrawal    CIWA protocol        [place] and also is NPO per SLP      Orders:   Telemetry. Seizure precautions.  Spot check oximetry.  Routine VS.  CIWA monitoring. SLP consult.  CL diet.  Full code. Brain MRI.  C-spine MRI.  Continuous VS.   NPO.   Neuro consult.  Echo.        Ativan 2-4 mg iv Q 1 hour prn> rec'd 1 mg iv x 1.  Morphine 2 mg iv Q 6 prn> rec'd x 1.  Compazine 10 mg po Q 6 prn.  Zofran 4 mg iv Q 4 prn> rec'd x 1. Hydralazine 10 mg iv Q 6 prn for BP > 170> rec'd x 1.

## 2019-05-17 NOTE — CONSULTS
[address]    Name:  Sari Travis  MR#:  [id number]  :  1950  ACCOUNT #:  [de-identified]  DATE OF SERVICE:  2019      REQUESTING PHYSICIAN:  Dr. Grecia Roy EVALUATION:  Seizure and weakness in the upper extremities. HISTORY:  The patient is a 71-year-old male with a history of hypertension, gastroesophageal reflux disease, Meniere disease, who retired from his job back in 2019 and has been staying at home per his wife, who is also a retired nurse. She tells me that she had gone out and when she came home and looked out of the window over to the patio, she saw that he was lying prone with his face down. She immediately went over and noticed that he had foamed at his mouth and was poorly responsive. Paramedics were called and he was immediately brought into the hospital.  Apparently, he had a lot of alcohol to drink that day and when he arrived in the emergency room, he was lethargic and was somewhat confused over the next day or so. He continued to complain of weakness in his upper extremities more than lower extremities and this prompted extensive imaging. He had MRI of the cervical spine, which showed disc herniation at C3-C4 level with some edema in the central cord. MRI of the brain was also performed, which showed hyperintensity on clear sequences in the left medial hippocampus and also some restricted diffusion in this area. Mentally, he has improved and is back to his baseline, but his weakness in the upper extremities persists. He has also had difficulty with swallowing and is not able to control his bladder. Denies any headache, changes in vision or speech. No history of seizures in the past.  He has had some balance difficulties at baseline related to Meniere's disease. PAST MEDICAL HISTORY:  As mentioned above. PAST SURGICAL HISTORY:  Tonsillectomy and basal cell carcinoma. ALLERGIES:  NONE.     HOME MEDICATIONS:  Pepcid and hydrochlorothiazide. SOCIAL HISTORY:  No history of smoking. Lives with his wife. He has retired recently. Socially drinks alcohol, but not on a routine basis. FAMILY HISTORY:  Noncontributory. REVIEW OF SYSTEMS:  As mentioned above. PHYSICAL EXAMINATION:  GENERAL:  The patient is lying in bed, in no acute distress. VITAL SIGNS:  Blood pressure 169/73, temperature is 97.4, pulse 79. NEUROLOGIC:  Pupils are equal, round, and reactive. Extraocular movements were full. No nystagmus. Face symmetric. Hearing is baseline. Tongue is midline. Muscle tone and bulk is normal.  His motor examination reveals weakness in both upper extremities, proximal greater than distal, proximally he is 2-3/5 and distally he is about of 4/5. He is able to raise both legs against gravity, but there is trace weakness in the legs as well. Deep tendon reflexes are brisk all over with upgoing toes. Sensation is intact. Gait examination is deferred. HEART:  Regular rate and rhythm. CHEST:  Clear. ABDOMEN:  Soft, nontender. Positive bowel sounds. EXTREMITIES:  No edema. LABORATORY DATA:  CBC and chemistries are unremarkable. Lipid profile, triglycerides 50, HDL 54, and LDL 65.   on admission and most recent reading is 185. MRI scan of the brain and cervical spine was independently reviewed. He has cord compression and abnormal intramedullary signal at C3-C4 due to central disc osteophyte complex. There is mild prevertebral edema at this level that was also seen. MRI scan of the brain shows abnormal restricted diffusion in the left hippocampus suspected to be toxic/metabolic etiology or prior seizure. There is cerebral volume loss and ex vacuo ventricular dilatation. EEG did not show any significant abnormalities. MRA did not show any hemodynamically significant stenosis. ASSESSMENT AND PLAN:  A 76-year-old male, who was admitted after he was found down and was noted to have froth at his mouth. The down time was unknown, but suspected to be several hours. He most likely had a seizure, which could have been from alcohol intoxication. MRI changes of restricted diffusion/flare abnormality in the medial hippocampus is likely related to the same. I will empirically cover him with Keppra at least for a short period, given the MRI abnormality. He will need a repeat scan in 4 weeks in this regard. Clinically, he has disproportionate upper extremity weakness compared to lower extremity weakness along with bladder dysfunction. This is from central cord syndrome related to disc herniation. Discussed with neurosurgeon, Dr. Brooklyn Pena. We will start him on steroids and he will need decompression at some point. Neurology will be available as needed. Thank you for this consultation.       Antonio Barboza MD      [id number]  D:  05/17/2019 14:03  T:  05/17/2019 14:14  JOB #:  [id number]

## 2019-05-17 NOTE — CONSULTS
Full consult dictated. I agree with Dr. Noe Center recommendations. We will follow with you.   He is able to get OOB with PT.

## 2019-05-17 NOTE — PROGRESS NOTES
Hospitalist Progress Note  Arelis Saleem MD  Answering service: [phone] OR [phone] from in house phone  Cell:       Date of Service:  2019  NAME:  Meir Tsai  :  1950  MRN:  [id number]      Admission Summary:   58-year-old man with a past medical history significant for hypertension, gastroesophageal reflux disease, Meniere's disease, who was in his usual state of health until the day of his presentation at the emergency room when the patient developed a change in mental status. The spouse stated that she was out of the house and when she came back found the patient laying prone in the backyard unresponsive. It is not clear how long the patient had been lying prone in the backyard. When the EMS arrived at the scene, it was reported that the patient was verbally responsive to the EMS crew that he had too much alcohol. The patient was administering alcohol by rectal enema stating that he wanted to try something new. The patient has no history of alcohol abuse according to his spouse, but today the patient had too much alcohol than usual.  The patient was brought to the emergency room for further evaluation. When the patient arrived at the emergency room, the patient remained lethargic, but easily arousable and also with verbal response. CT scan of the head was obtained, this was negative. The patient's alcohol level was 162. The patient was referred to the Hospitalist Service for evaluation for admission.   No record of prior admission to this hospital        Interval history / Subjective:       Patient seen bedside with extensive discussions with the daughter and wife     Still has weakness and tingling of the arms     NPO but awake now and not in delirium        Assessment & Plan:         Delirium and fall , resolved   Downtime of 5 hours on the floor , MRI shows hippocampus restricted diffusion , likley suggestive of seizure like activity ? 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   Also initially though delirium due to alcohol intoxication also , alcohol level of 162    EEG , cannot rule out seizure , started on keppra by neuro       leukocytosis and lactic acidosis on admission    likely reactive , may be had seizure , it snot SIRS , no infection     B/L upper extremity weakness and tingling in arms with neck and shoulder pain   MRI cervical spine with central cord compression , C3/4 due to central disc herniation     HTN   On prn hydralazine     Alcohol intoxication and now risk of withdrawal   CIWA protocol     [place] and also is NPO per SLP   For follow up today     Consider NG feeds if still is NPO       Code status:  Full   DVT prophylaxis: [place] 6 discussed with: Patient/Family  Disposition: TBD     Hospital Problems  Date Reviewed: 5/15/2019          Codes Class Noted POA    * (Principal) Alcohol intoxication delirium ([facility] [address] ICD-10-CM: U14.758  ICD-9-CM: 291.0  5/15/2019 Yes        Alcohol intoxication ([facility] [address] ICD-10-CM: T94.856  ICD-9-CM: 305.00  5/15/2019 Unknown                Review of Systems:   A comprehensive review of systems was negative except for that written in the HPI. Vital Signs:    Last 24hrs VS reviewed since prior progress note. Most recent are:  Visit Vitals  /73   Pulse 79   Temp 97.4 °F (36.3 °C)   Resp 18   Ht 5' 8\" (1.727 m)   Wt 75.3 kg (166 lb)   SpO2 98%   BMI 25.24 kg/m²         Intake/Output Summary (Last 24 hours) at 5/17/2019 1441  Last data filed at 5/17/2019 1200  Gross per 24 hour   Intake 4707.5 ml   Output 3600 ml   Net 1107.5 ml        Physical Examination:             Constitutional:   Awake    ENT:  Oral mucous moist, oropharynx benign. Neck supple,    Resp:  CTA bilaterally. No wheezing/rhonchi/rales. No accessory muscle use   CV:  Regular rhythm, normal rate, no murmurs, gallops, rubs    GI:  Soft, non distended, non tender.  normoactive bowel sounds, no hepatosplenomegaly     Musculoskeletal:  No edema, warm, 2+ pulses throughout    Neurologic:  upper extremity 4/5 b/l             Data Review:    Review and/or order of clinical lab test      Labs:     Recent Labs     05/17/19 0021 05/16/19 0456   WBC 7.4 9.4   HGB 13.6 13.2   HCT 43.1 40.9    170     Recent Labs     05/17/19  0021 05/16/19  0456 05/15/19  0154 05/14/19  1852    139 140 142   K 3.7 4.1 3.0* 3.9    109* 108 108   CO2 28 23 17* 26   BUN 11 17 15 13   CREA 0.72 0.85 0.66* 0.89   GLU 94 102* 58* 96   CA 8.2* 8.1* 7.5* 8.6   MG 2.1  --  1.6 2.3   PHOS 2.0*  --  3.8  --      Recent Labs     05/17/19  0021 05/16/19  0456 05/15/19  0154 05/14/19  1852   SGOT 31 32 53* 20   ALT 30 31 39 24   AP 65 63 73 82   TBILI 0.8 0.8 0.8 0.5   TP 6.0* 5.5* 5.7* 7.1   ALB 2.9* 2.7* 3.1* 3.8   GLOB 3.1 2.8 2.6 3.3   AML  --   --  58  --    LPSE  --   --  56* 88     No results for input(s): INR, PTP, APTT in the last 72 hours. No lab exists for component: INREXT, INREXT   No results for input(s): FE, TIBC, PSAT, FERR in the last 72 hours. No results found for: FOL, RBCF   No results for input(s): PH, PCO2, PO2 in the last 72 hours.   Recent Labs     05/16/19  0456 05/15/19  0154 05/14/19  1852    564* 111   CKNDX  --  2.3  --    TROIQ  --  <0.05 <0.05     Lab Results   Component Value Date/Time    Cholesterol, total 129 05/15/2019 01:54 AM    HDL Cholesterol 54 05/15/2019 01:54 AM    LDL, calculated 65 05/15/2019 01:54 AM    Triglyceride 50 05/15/2019 01:54 AM    CHOL/HDL Ratio 2.4 05/15/2019 01:54 AM     Lab Results   Component Value Date/Time    Glucose (POC) 82 05/17/2019 12:11 PM    Glucose (POC) 88 05/17/2019 06:25 AM    Glucose (POC) 94 05/16/2019 04:45 PM    Glucose (POC) 97 05/16/2019 11:19 AM    Glucose (POC) 81 05/15/2019 06:43 AM     Lab Results   Component Value Date/Time    Color YELLOW/STRAW 05/14/2019 08:14 PM    Appearance CLEAR 05/14/2019 08:14 PM    Specific gravity 1.016 05/14/2019 08:14 PM    pH (UA) 5.5 05/14/2019 08:14 PM    Protein NEGATIVE  05/14/2019 08:14 PM    Glucose NEGATIVE  05/14/2019 08:14 PM    Ketone 15 (A) 05/14/2019 08:14 PM    Bilirubin NEGATIVE  05/14/2019 08:14 PM    Urobilinogen 0.2 05/14/2019 08:14 PM    Nitrites NEGATIVE  05/14/2019 08:14 PM    Leukocyte Esterase NEGATIVE  05/14/2019 08:14 PM         Medications Reviewed:     Current Facility-Administered Medications   Medication Dose Route Frequency    hydrALAZINE (APRESOLINE) 20 mg/mL injection 10 mg  10 mg IntraVENous Q4H PRN    metoprolol (LOPRESSOR) injection 2.5 mg  2.5 mg IntraVENous Q6H PRN    dexamethasone (PF) (DECADRON) injection 10 mg  10 mg IntraVENous ONCE    dexamethasone (PF) (DECADRON) injection 6 mg  6 mg IntraVENous Q6H    levETIRAcetam (KEPPRA) 500 mg in 0.9% sodium chloride 100 mL IVPB  500 mg IntraVENous Q12H    morphine injection 2 mg  2 mg IntraVENous Q6H PRN    0.9% sodium chloride infusion  75 mL/hr IntraVENous CONTINUOUS    sodium chloride (NS) flush 5-40 mL  5-40 mL IntraVENous Q8H    sodium chloride (NS) flush 5-40 mL  5-40 mL IntraVENous PRN    ondansetron (ZOFRAN) injection 4 mg  4 mg IntraVENous Q4H PRN    naloxone (NARCAN) injection 0.4 mg  0.4 mg IntraVENous PRN    bisacodyl (DULCOLAX) tablet 5 mg  5 mg Oral DAILY PRN    heparin (porcine) injection 5,000 Units  5,000 Units SubCUTAneous Q8H    pantoprazole (PROTONIX) 40 mg in sodium chloride 0.9% 10 mL injection  40 mg IntraVENous DAILY    lactated Ringers 5,009 mL with folic acid 1 mg, thiamine 100 mg, mvi, adult no. 4 with vit K 10 mL infusion   IntraVENous DAILY    acetaminophen (TYLENOL) tablet 650 mg  650 mg Oral Q4H PRN    lidocaine (LIDODERM) 5 % patch 1 Patch  1 Patch TransDERmal Q24H    LORazepam (ATIVAN) injection 2 mg  2 mg IntraVENous Q1H PRN    LORazepam (ATIVAN) injection 4 mg  4 mg IntraVENous Q1H PRN    prochlorperazine (COMPAZINE) tablet 10 mg  10 mg Oral Q6H PRN    HYDROcodone-acetaminophen (NORCO) 5-325 mg per tablet 1 Tab  1 Tab Oral Q6H PRN    hydroCHLOROthiazide (HYDRODIURIL) tablet 25 mg  25 mg Oral DAILY    phenol throat spray (CHLORASEPTIC) 1 Spray  1 Spray Oral PRN     ______________________________________________________________________  EXPECTED LENGTH OF STAY: 3d 9h  ACTUAL LENGTH OF STAY:          2                 Arelis Saleem MD

## 2019-05-18 LAB
GLUCOSE BLD STRIP.AUTO-MCNC: 148 MG/DL (ref 65–100)
GLUCOSE BLD STRIP.AUTO-MCNC: 170 MG/DL (ref 65–100)
SERVICE CMNT-IMP: ABNORMAL
SERVICE CMNT-IMP: ABNORMAL

## 2019-05-18 PROCEDURE — 74011000258 HC RX REV CODE- 258: Performed by: PSYCHIATRY & NEUROLOGY

## 2019-05-18 PROCEDURE — 74011250636 HC RX REV CODE- 250/636: Performed by: PSYCHIATRY & NEUROLOGY

## 2019-05-18 PROCEDURE — 74011000250 HC RX REV CODE- 250: Performed by: INTERNAL MEDICINE

## 2019-05-18 PROCEDURE — 65660000000 HC RM CCU STEPDOWN

## 2019-05-18 PROCEDURE — 74011250636 HC RX REV CODE- 250/636: Performed by: NURSE PRACTITIONER

## 2019-05-18 PROCEDURE — C9113 INJ PANTOPRAZOLE SODIUM, VIA: HCPCS | Performed by: INTERNAL MEDICINE

## 2019-05-18 PROCEDURE — 82962 GLUCOSE BLOOD TEST: CPT

## 2019-05-18 PROCEDURE — 94760 N-INVAS EAR/PLS OXIMETRY 1: CPT

## 2019-05-18 PROCEDURE — 97110 THERAPEUTIC EXERCISES: CPT

## 2019-05-18 PROCEDURE — 74011250636 HC RX REV CODE- 250/636: Performed by: INTERNAL MEDICINE

## 2019-05-18 PROCEDURE — 97161 PT EVAL LOW COMPLEX 20 MIN: CPT

## 2019-05-18 PROCEDURE — 74011250637 HC RX REV CODE- 250/637: Performed by: INTERNAL MEDICINE

## 2019-05-18 RX ADMIN — SODIUM CHLORIDE 75 ML/HR: 900 INJECTION, SOLUTION INTRAVENOUS at 02:09

## 2019-05-18 RX ADMIN — SODIUM CHLORIDE 500 MG: 900 INJECTION, SOLUTION INTRAVENOUS at 13:44

## 2019-05-18 RX ADMIN — HYDROCODONE BITARTRATE AND ACETAMINOPHEN 1 TABLET: 5; 325 TABLET ORAL at 19:58

## 2019-05-18 RX ADMIN — HEPARIN SODIUM 5000 UNITS: 5000 INJECTION INTRAVENOUS; SUBCUTANEOUS at 03:49

## 2019-05-18 RX ADMIN — HEPARIN SODIUM 5000 UNITS: 5000 INJECTION INTRAVENOUS; SUBCUTANEOUS at 12:50

## 2019-05-18 RX ADMIN — DEXAMETHASONE SODIUM PHOSPHATE 6 MG: 10 INJECTION INTRAMUSCULAR; INTRAVENOUS at 21:00

## 2019-05-18 RX ADMIN — SODIUM CHLORIDE 40 MG: 9 INJECTION, SOLUTION INTRAMUSCULAR; INTRAVENOUS; SUBCUTANEOUS at 08:25

## 2019-05-18 RX ADMIN — Medication 10 ML: at 06:23

## 2019-05-18 RX ADMIN — Medication 10 ML: at 21:01

## 2019-05-18 RX ADMIN — DEXAMETHASONE SODIUM PHOSPHATE 6 MG: 10 INJECTION INTRAMUSCULAR; INTRAVENOUS at 13:44

## 2019-05-18 RX ADMIN — DEXAMETHASONE SODIUM PHOSPHATE 6 MG: 10 INJECTION INTRAMUSCULAR; INTRAVENOUS at 08:31

## 2019-05-18 RX ADMIN — Medication 10 ML: at 13:44

## 2019-05-18 RX ADMIN — HYDRALAZINE HYDROCHLORIDE 10 MG: 20 INJECTION INTRAMUSCULAR; INTRAVENOUS at 19:58

## 2019-05-18 RX ADMIN — DEXAMETHASONE SODIUM PHOSPHATE 6 MG: 10 INJECTION INTRAMUSCULAR; INTRAVENOUS at 02:00

## 2019-05-18 RX ADMIN — HEPARIN SODIUM 5000 UNITS: 5000 INJECTION INTRAVENOUS; SUBCUTANEOUS at 21:00

## 2019-05-18 RX ADMIN — HYDROCHLOROTHIAZIDE 25 MG: 25 TABLET ORAL at 08:31

## 2019-05-18 RX ADMIN — FOLIC ACID: 5 INJECTION, SOLUTION INTRAMUSCULAR; INTRAVENOUS; SUBCUTANEOUS at 08:31

## 2019-05-18 RX ADMIN — MORPHINE SULFATE 2 MG: 2 INJECTION, SOLUTION INTRAMUSCULAR; INTRAVENOUS at 11:07

## 2019-05-18 NOTE — CONSULTS
[address]    Name:  Fawad Portillo  MR#:  [id number]  :  1950  ACCOUNT #:  [de-identified]  DATE OF SERVICE:  2019      REASON FOR CONSULTATION:  Central cord syndrome. HISTORY OF PRESENT ILLNESS:  This is a 71-year-old gentleman who was admitted on 05/15. His spouse came home and found him lying prone in the backyard. When she turned him over, she said that he had froth around his mouth as if he had had a seizure. She thinks that he was there for at least five hours. He is amnestic for the event, although there is report on the record that he told the EMS crew that he tried administering alcohol by rectal enema and his alcohol level was noted to be 0.162 upon his arrival to the emergency room. He does recall coming to on the ground and being unable to move his hands, being unable to get up. He said that he called for help for several hours. His wife notes that he was down long enough to get a significant sunburn on his back. When he arrived to the hospital, he was lethargic, but his altered mental status improved. He continued to have persistent upper extremity weakness. He did initially have a CT scan of his cervical spine that did not show any fracture, malalignment, or prevertebral edema. In addition, he had a head CT that was unremarkable. He had an MRI of the brain that did show area of signal change within the hippocampus suggesting potential seizure, although that is not specific for seizure. In addition, he had an MRI of the cervical spine that showed edema and cervical stenosis. I was asked to see him in consultation. PAST MEDICAL HISTORY:  Hypertension, GERD, Meniere's disease. PAST SURGICAL HISTORY:  He had a tonsillectomy. He had a basal-cell cancer removed from his right cheek as well as a melanoma, the melanoma was removed with clear margins. MEDICATIONS AT HOME:  Pepcid, hydrochlorothiazide.     ALLERGIES:  NO KNOWN DRUG ALLERGIES. FAMILY HISTORY:  No significant family history. SOCIAL HISTORY:  He is . No history of tobacco use. Does use alcohol. REVIEW OF SYSTEMS:  Denies any headache, does complain of neck pain, shoulder pain. No vision changes;  he wears hearing aids. No chest pain, shortness of breath. Positive for some difficulty swallowing. Positive for weakness in his upper extremities. Positive for sunburn on his back as well as a small area of ecchymosis on his right arm. Positive for urinary dysfunction and some bowel incontinence. PHYSICAL EXAMINATION:  GENERAL:  This is a well-developed, well-nourished gentleman who was examined lying in a hospital bed. VITAL SIGNS:  Temperature 98.2, blood pressure 175/85, pulse 87. NEUROLOGIC:  He is alert. He is oriented. Normal affect. Fluent speech. Conjugate gaze. Symmetric face. He is able to show his teeth. He does have a slightly hoarse voice. Normal shoulder shrug. He is able to lift his shosulders up and his arms up, but not above his shoulder level. He is 4/5 in his deltoid, 4/5 in his biceps, 2/5 in his wrist extensor, 0 to 1/5 in his , 4/5 in his triceps. In his lower extremities, he is 4+/5 in his tibialis anterior, EHL, gastrocnemius, hip flexors, and quadriceps. He does have clonus on the right, negative on the left. Positive Babinski sign. Negative Stas sign bilaterally. Decreased sensation to touch in both hands; sensory is better in the feet than in the hands. IMAGING STUDIES:  He had an MRI of the cervical spine that shows cord signal change at C3-4 level. There is a disc bulge with some stenosis at this level. ASSESSMENT AND PLAN:  This is a 44-year-old gentleman who apparently had a fall in his backyard after becoming intoxicated. I think the fall caused the central cord syndrome.   He does not have any evidence of fracture or instability radiographically at this level nor does he have any physical pain on exam.  I agree with Dr. Mike Naranjo with starting steroids to see how much his symptoms improve. Decompression will determine on how much he improves on steroids as well as if he has any additional seizure activity. We will follow up with you. Thank you for this consultation.       Marina Fonseca MD      [id number]  D:  05/17/2019 15:51  T:  05/17/2019 19:10  JOB #:  [id number]

## 2019-05-18 NOTE — PROGRESS NOTES
Some improvement with steroids, but still with significant weakness in UE  Will continue steroids   Continue keppra for history of seizure  Will need surgical decompression - ACDF 3/4  Tentative plan is for surgery Tuesday am

## 2019-05-18 NOTE — PROGRESS NOTES
Assumed care of pt this evening. Pt is alert and oriented with poor dexterity and weakness in the upper extremities.

## 2019-05-18 NOTE — PROCEDURES
[address]   EEG    Name:  Radha Cote  MR#:  [id number]  :  1950  ACCOUNT #:  [de-identified]  DATE OF SERVICE:  2019      REQUESTING PHYSICIAN:  Kinza Beckham MD    HISTORY:  The patient is a 62-year-old male, who is being evaluated after recent seizure-like activity and was found down on its onset. DESCRIPTION:  This is an 18-channel EEG performed on awake and drowsy patient. During wakefulness, the dominant posterior background rhythm consists of a symmetric, very well-modulated, medium voltage rhythms in the 8-9 Hz frequency range. Faster beta frequencies were seen in the frontal head region. Drowsiness is characterized by slowing in vertex waves. Photic stimulation did not elicit driving response. Hyperventilation was not performed. EEG SUMMARY:  Normal study. CLINICAL INTERPRETATION:  This is a normal EEG during awake and drowsy states of the patient. No lateralizing or epileptiform features were noted.         Kaleb Hernandez MD      [id number]  D:  2019 16:43  T:  2019 23:35  JOB #:  [id number]

## 2019-05-18 NOTE — PROGRESS NOTES
Hospitalist Progress Note  Betzy Deleon MD  Answering service: [phone] from in house phone  Cell:       Date of Service:  2019  NAME:  Mulu Flaherty  :  1950  MRN:  [id number]      Admission Summary:   60-year-old man with a past medical history significant for hypertension, gastroesophageal reflux disease, Meniere's disease, who was in his usual state of health until the day of his presentation at the emergency room when the patient developed a change in mental status. The spouse stated that she was out of the house and when she came back found the patient laying prone in the backyard unresponsive. It is not clear how long the patient had been lying prone in the backyard. When the EMS arrived at the scene, it was reported that the patient was verbally responsive to the EMS crew that he had too much alcohol. The patient was administering alcohol by rectal enema stating that he wanted to try something new. The patient has no history of alcohol abuse according to his spouse, but today the patient had too much alcohol than usual.  The patient was brought to the emergency room for further evaluation. When the patient arrived at the emergency room, the patient remained lethargic, but easily arousable and also with verbal response. CT scan of the head was obtained, this was negative. The patient's alcohol level was 162. The patient was referred to the Hospitalist Service for evaluation for admission.   No record of prior admission to this hospital        Interval history / Subjective:       Patient seen bedside with extensive discussions with  wife     Still has weakness and tingling of the arms      strated eating and looks better mentation wise   Delirium resolved            Assessment & Plan:         Delirium and fall , resolved on    Downtime of 5 hours on the floor , MRI shows hippocampus restricted diffusion , nola suggestive of seizure like activity ? 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   Also initially though delirium due to alcohol intoxication also , alcohol level of 162    EEG , no seizure , started on keppra by neuro though for now       leukocytosis and lactic acidosis on admission    likely reactive , may be had seizure , it snot SIRS , no infection     B/L upper extremity weakness and tingling in arms with neck and shoulder pain   MRI cervical spine with central cord compression , C3/4 due to central disc herniation   Neurosurgery saw patient and plan is for decompression on 5/21     HTN   On prn hydralazine     Alcohol intoxication and now risk of withdrawal   CIWA protocol       Started full liquids   [place]       Code status:  Full   DVT prophylaxis: scd     Care Plan discussed with: Patient/Family  Disposition: TBD     Hospital Problems  Date Reviewed: 5/15/2019          Codes Class Noted POA    * (Principal) Alcohol intoxication delirium ([facility] [address] ICD-10-CM: Z32.426  ICD-9-CM: 291.0  5/15/2019 Yes        Alcohol intoxication ([facility] [address] ICD-10-CM: A15.159  ICD-9-CM: 305.00  5/15/2019 Unknown                Review of Systems:   A comprehensive review of systems was negative except for that written in the HPI. Vital Signs:    Last 24hrs VS reviewed since prior progress note. Most recent are:  Visit Vitals  /90 (BP 1 Location: Left arm, BP Patient Position: At rest)   Pulse 96   Temp 98.6 °F (37 °C)   Resp 19   Ht 5' 8\" (1.727 m)   Wt 72.3 kg (159 lb 6.3 oz)   SpO2 96%   BMI 24.24 kg/m²         Intake/Output Summary (Last 24 hours) at 5/18/2019 1437  Last data filed at 5/18/2019 5530  Gross per 24 hour   Intake 3676.25 ml   Output 2250 ml   Net 1426.25 ml        Physical Examination:             Constitutional:   Awake and feels better    ENT:  Oral mucous moist, oropharynx benign. Neck supple,    Resp:  CTA bilaterally. No wheezing/rhonchi/rales.  No accessory muscle use   CV:  Regular rhythm, normal rate, no murmurs, gallops, rubs    GI:  Soft, non distended, non tender. normoactive bowel sounds, no hepatosplenomegaly     Musculoskeletal:  No edema, warm, 2+ pulses throughout    Neurologic:  upper extremity 4/5 b/l             Data Review:    Review and/or order of clinical lab test      Labs:     Recent Labs     05/17/19 0021 05/16/19 0456   WBC 7.4 9.4   HGB 13.6 13.2   HCT 43.1 40.9    170     Recent Labs     05/17/19 0021 05/16/19 0456    139   K 3.7 4.1    109*   CO2 28 23   BUN 11 17   CREA 0.72 0.85   GLU 94 102*   CA 8.2* 8.1*   MG 2.1  --    PHOS 2.0*  --      Recent Labs     05/17/19 0021 05/16/19 0456   SGOT 31 32   ALT 30 31   AP 65 63   TBILI 0.8 0.8   TP 6.0* 5.5*   ALB 2.9* 2.7*   GLOB 3.1 2.8     No results for input(s): INR, PTP, APTT in the last 72 hours. No lab exists for component: INREXT, INREXT   No results for input(s): FE, TIBC, PSAT, FERR in the last 72 hours. No results found for: FOL, RBCF   No results for input(s): PH, PCO2, PO2 in the last 72 hours.   Recent Labs     05/16/19 0456        Lab Results   Component Value Date/Time    Cholesterol, total 129 05/15/2019 01:54 AM    HDL Cholesterol 54 05/15/2019 01:54 AM    LDL, calculated 65 05/15/2019 01:54 AM    Triglyceride 50 05/15/2019 01:54 AM    CHOL/HDL Ratio 2.4 05/15/2019 01:54 AM     Lab Results   Component Value Date/Time    Glucose (POC) 170 (H) 05/18/2019 12:37 PM    Glucose (POC) 114 (H) 05/17/2019 04:42 PM    Glucose (POC) 82 05/17/2019 12:11 PM    Glucose (POC) 88 05/17/2019 06:25 AM    Glucose (POC) 94 05/16/2019 04:45 PM     Lab Results   Component Value Date/Time    Color YELLOW/STRAW 05/14/2019 08:14 PM    Appearance CLEAR 05/14/2019 08:14 PM    Specific gravity 1.016 05/14/2019 08:14 PM    pH (UA) 5.5 05/14/2019 08:14 PM    Protein NEGATIVE  05/14/2019 08:14 PM    Glucose NEGATIVE  05/14/2019 08:14 PM    Ketone 15 (A) 05/14/2019 08:14 PM    Bilirubin NEGATIVE  05/14/2019 08:14 PM    Urobilinogen 0.2 05/14/2019 08:14 PM    Nitrites NEGATIVE  05/14/2019 08:14 PM    Leukocyte Esterase NEGATIVE  05/14/2019 08:14 PM         Medications Reviewed:     Current Facility-Administered Medications   Medication Dose Route Frequency    hydrALAZINE (APRESOLINE) 20 mg/mL injection 10 mg  10 mg IntraVENous Q4H PRN    metoprolol (LOPRESSOR) injection 2.5 mg  2.5 mg IntraVENous Q6H PRN    dexamethasone (PF) (DECADRON) injection 6 mg  6 mg IntraVENous Q6H    levETIRAcetam (KEPPRA) 500 mg in 0.9% sodium chloride 100 mL IVPB  500 mg IntraVENous Q12H    morphine injection 2 mg  2 mg IntraVENous Q6H PRN    0.9% sodium chloride infusion  75 mL/hr IntraVENous CONTINUOUS    sodium chloride (NS) flush 5-40 mL  5-40 mL IntraVENous Q8H    sodium chloride (NS) flush 5-40 mL  5-40 mL IntraVENous PRN    ondansetron (ZOFRAN) injection 4 mg  4 mg IntraVENous Q4H PRN    naloxone (NARCAN) injection 0.4 mg  0.4 mg IntraVENous PRN    bisacodyl (DULCOLAX) tablet 5 mg  5 mg Oral DAILY PRN    heparin (porcine) injection 5,000 Units  5,000 Units SubCUTAneous Q8H    pantoprazole (PROTONIX) 40 mg in sodium chloride 0.9% 10 mL injection  40 mg IntraVENous DAILY    lactated Ringers 8,834 mL with folic acid 1 mg, thiamine 100 mg, mvi, adult no. 4 with vit K 10 mL infusion   IntraVENous DAILY    acetaminophen (TYLENOL) tablet 650 mg  650 mg Oral Q4H PRN    lidocaine (LIDODERM) 5 % patch 1 Patch  1 Patch TransDERmal Q24H    prochlorperazine (COMPAZINE) tablet 10 mg  10 mg Oral Q6H PRN    HYDROcodone-acetaminophen (NORCO) 5-325 mg per tablet 1 Tab  1 Tab Oral Q6H PRN    hydroCHLOROthiazide (HYDRODIURIL) tablet 25 mg  25 mg Oral DAILY    phenol throat spray (CHLORASEPTIC) 1 Spray  1 Spray Oral PRN     ______________________________________________________________________  EXPECTED LENGTH OF STAY: 3d 9h  ACTUAL LENGTH OF STAY:          3                 Jame Sever, MD

## 2019-05-18 NOTE — PROGRESS NOTES
Problem: Mobility Impaired (Adult and Pediatric)  Goal: *Acute Goals and Plan of Care (Insert Text)  Description  Physical Therapy Goals  Initiated 5/18/2019  1. Patient will move from supine to sit and sit to supine , scoot up and down and roll side to side in bed with minimal assistance/contact guard assist within 7 day(s). 2.  Patient will transfer from bed to chair and chair to bed with minimal assistance/contact guard assist using the least restrictive device within 7 day(s). 3.  Patient will perform sit to stand with minimal assistance/contact guard assist within 7 day(s). 4.  Patient will ambulate with minimal assistance/contact guard assist for 150 feet with the least restrictive device within 7 day(s). 5.  Patient will participate in a strengthening program and be independent within 7 days. Outcome: Progressing Towards Goal   PHYSICAL THERAPY EVALUATION  Patient: Jennifer Hidalgo (83 y.o. male)  Date: 5/18/2019  Primary Diagnosis: [facility] [F10.929]        Precautions: central cord syndrome/C disk protrusion. ASSESSMENT :  Based on the objective data described below, the patient presents with generalized weakness especially in BUEs, decreased functional mobility, decreased gait. Pt and wife present , pt just oob with nsg with assist of 2. Per nurse and wife, MD states OOB activity acceptable. Pt working on lifting arms up and over head. Cautioned that this would be challenging and stressful at this time. Pt and wife given ideas for exercise including using meal tray for gravity eliminated exercise of UEs, use of ball (soft \"stress\" balls brought from home) including using pincer grasp, opening hand all the way. AROM ex for LEs including ankle pumps, quad and glut sets, hip ER and IR. Cautioned to not lift anything with either hand, no extreme ROM (pt inquiring about \"touching his toes\") and to stop if pain or numbness increases.   Pt and wife verbalized understanding. After sx pt would be an excellent inpatient rehab candidate. Pt previously ind in ambulation. Will follow for strengthening and pt/family education as well as mobility as tolerate. Patient will benefit from skilled intervention to address the above impairments. Patient?s rehabilitation potential is considered to be Good  Factors which may influence rehabilitation potential include:   ? None noted  ? Mental ability/status  ? Medical condition  ? Home/family situation and support systems  ? Safety awareness  ? Pain tolerance/management  ? Other:      PLAN :  Recommendations and Planned Interventions:  ?           Bed Mobility Training             ? Neuromuscular Re-Education  ? Transfer Training                   ? Orthotic/Prosthetic Training  ? Gait Training                         ? Modalities  ? Therapeutic Exercises           ? Edema Management/Control  ? Therapeutic Activities            ? Patient and Family Training/Education  ? Other (comment):    Frequency/Duration: Patient will be followed by physical therapy  3 times a week to address goals. Discharge Recommendations: Rehab and Inpatient Rehab if able to tolerate 3 hours of therapy a day vs snf if unable to tolerate more than 90 minutes of therapy a day. Further Equipment Recommendations for Discharge: tbd      SUBJECTIVE:   Patient stated ? SO more is not better. ?   Verified that more is not better at this time  OBJECTIVE DATA SUMMARY:   HISTORY:    Past Medical History:   Diagnosis Date    Cancer ([facility] [address]     BCCA skin    Family history of skin cancer     Hypertension     Ill-defined condition     meineer's disease     Past Surgical History:   Procedure Laterality Date    COLONOSCOPY N/A 7/26/2017    COLONOSCOPY performed by Harper Spencer MD at [facility] ENDOSCOPY    HX HEENT      wisdom teeth extracted    HX MOHS PROCEDURES  06/20/2017    [facility] AMALIA manzanares by Dr. Karley Woodruff       Prior Level of Function/Home Situation:   Personal factors and/or comorbidities impacting plan of care: h/o ETOH abuse    Home Situation  Home Environment: Private residence  One/Two Story Residence: Two story  Living Alone: No  Support Systems: Child(carlita), Spouse/Significant Other/Partner  Patient Expects to be Discharged to[de-identified] Private residence  Current DME Used/Available at Home: None    EXAMINATION/PRESENTATION/DECISION MAKING:   Critical Behavior:  Neurologic State: Alert, Appropriate for age, Eyes open to stimulus, Eyes open to pain, Eyes open spontaneously, Eyes open to voice  Orientation Level: Oriented X4  Cognition: Appropriate decision making, Appropriate for age attention/concentration, Follows commands  Safety/Judgement: Awareness of environment  Hearing:   Auditory  Auditory Impairment: Hearing aid(s), Hard of hearing, right side  Hearing Aids/Status: With patient  Skin:  aneta face  Edema: mild edema both h ands  Range Of Motion:  AROM: Grossly decreased, non-functional(in BUEs particularly distally)      PROM: Within functional limits           Strength:    Strength: Grossly decreased, non-functional(generally 2/5 in BUEs, R weaker than left; BLEs 3/5)         Tone & Sensation:   Tone: Abnormal      numbness        Sensation: Impaired               Coordination:  Coordination: Grossly decreased, non-functional  Vision:      Functional Mobility:           Balance:   Sitting: Impaired  Sitting - Static: Fair (occasional)  Ambulation/Gait Training:      Per nursing 2 person assist to chair         Therapeutic Exercises:   See above    Functional Measure:    Functional Reach:    Completed:  ? standing       ? seated           Average: 0       Functional reach: (standing)  Reaches £  6 in = High Fall Risk  Reaches 6-10 in = Moderate Fall Risk    Reaches ³  10 in = Low Fall Risk  mona Mojica. Functional reach: a new clinical measure of balance. J [facility] Leti Webb; 39: [id number]. Modified Functional Reach: (seated)  Age: Men: Women:   21-39 17.9\" 17.6\"   40-59 17.5\" 15.9\"   60-79 14.4\" 13.2\"   80-97 14.0\" 12.5\"       Terell Larsen Forward and Lateral Sitting Functional Reach in Younger, Middle-aged, and Older Adults. J Geriatric Phys Ther. 2007; 30:43-48            Based on the above components, the patient evaluation is determined to be of the following complexity level: LOW     Pain:  Pain Scale 1: Numeric (0 - 10)  Pain Intensity 1: 0              Activity Tolerance:   fair  Please refer to the flowsheet for vital signs taken during this treatment. After treatment:   ?         Patient left in no apparent distress sitting up in chair  ? Patient left in no apparent distress in bed  ? Call bell left within reach  ? Nursing notified  ? Caregiver present  ? Bed alarm activated    COMMUNICATION/EDUCATION:   The patient?s plan of care was discussed with: Registered Nurse. ?         Fall prevention education was provided and the patient/caregiver indicated understanding. ? Patient/family have participated as able in goal setting and plan of care. ?         Patient/family agree to work toward stated goals and plan of care. ?         Patient understands intent and goals of therapy, but is neutral about his/her participation. ? Patient is unable to participate in goal setting and plan of care.     Thank you for this referral.  Thiago Vargas, PT   Time Calculation: 22 mins

## 2019-05-18 NOTE — PROGRESS NOTES
Orders received and chart reviewed. Noted pt with weakness in BUEs and bladder dysfunction. Following extensive testing per chart pt with central cord dysfunction and Cervical disc herniations. Will hold PT until determination of surgical candidacy.

## 2019-05-18 NOTE — PROGRESS NOTES
Problem: Pressure Injury - Risk of  Goal: *Prevention of pressure injury  Description  Document Sha Scale and appropriate interventions in the flowsheet. Outcome: Progressing Towards Goal     Problem: Falls - Risk of  Goal: *Absence of Falls  Description  Document Catherene Bunting Fall Risk and appropriate interventions in the flowsheet.   Outcome: Progressing Towards Goal

## 2019-05-18 NOTE — CONSULTS
Neurology Progress Note    Patient ID:  Aureliano Muñoz  [id number]  78 y.o.  1950    Chief Complaint: Possible seizure and weakness    Subjective:     69-year-old gentleman who was admitted for weakness and possible seizure after being found down by his wife. Neurology evaluated him yesterday. MRI brain was done showing evidence of left medial hippocampal signal change concerning for seizure activity. EEG negative. He also has a herniated disc at C3-4 and is pending surgery in a few days. No history of seizures but his son does have epilepsy. No repeat seizures since being in the hospital.  He is on low-dose Keppra. Objective:       ROS:  Per HPI  All other 12 pt ROS negative    Meds:  Current Facility-Administered Medications   Medication Dose Route Frequency    hydrALAZINE (APRESOLINE) 20 mg/mL injection 10 mg  10 mg IntraVENous Q4H PRN    metoprolol (LOPRESSOR) injection 2.5 mg  2.5 mg IntraVENous Q6H PRN    dexamethasone (PF) (DECADRON) injection 6 mg  6 mg IntraVENous Q6H    levETIRAcetam (KEPPRA) 500 mg in 0.9% sodium chloride 100 mL IVPB  500 mg IntraVENous Q12H    morphine injection 2 mg  2 mg IntraVENous Q6H PRN    0.9% sodium chloride infusion  75 mL/hr IntraVENous CONTINUOUS    sodium chloride (NS) flush 5-40 mL  5-40 mL IntraVENous Q8H    sodium chloride (NS) flush 5-40 mL  5-40 mL IntraVENous PRN    ondansetron (ZOFRAN) injection 4 mg  4 mg IntraVENous Q4H PRN    naloxone (NARCAN) injection 0.4 mg  0.4 mg IntraVENous PRN    bisacodyl (DULCOLAX) tablet 5 mg  5 mg Oral DAILY PRN    heparin (porcine) injection 5,000 Units  5,000 Units SubCUTAneous Q8H    pantoprazole (PROTONIX) 40 mg in sodium chloride 0.9% 10 mL injection  40 mg IntraVENous DAILY    lactated Ringers 0,416 mL with folic acid 1 mg, thiamine 100 mg, mvi, adult no. 4 with vit K 10 mL infusion   IntraVENous DAILY    acetaminophen (TYLENOL) tablet 650 mg  650 mg Oral Q4H PRN    lidocaine (LIDODERM) 5 % patch 1 Patch  1 Patch TransDERmal Q24H    prochlorperazine (COMPAZINE) tablet 10 mg  10 mg Oral Q6H PRN    HYDROcodone-acetaminophen (NORCO) 5-325 mg per tablet 1 Tab  1 Tab Oral Q6H PRN    hydroCHLOROthiazide (HYDRODIURIL) tablet 25 mg  25 mg Oral DAILY    phenol throat spray (CHLORASEPTIC) 1 Spray  1 Spray Oral PRN       MRI Results (maximum last 3): Results from Hospital Encounter encounter on 05/14/19   MRA NECK WO CONT    Narrative INDICATION: Diffusion restriction in the left hippocampus, left posterior  cerebral artery P1 segment stenosis. COMPARISON:  None  TECHNIQUE: 2-D time-of-flight MRA of the neck was performed. Multiplanar  reconstructions were obtained. FINDINGS:  The visualized common carotid, and internal carotid arteries are patent with no  flow-limiting stenosis. The vertebral arteries are codominant and patent. There is less than 50. % stenosis in the right internal carotid artery utilizing  NASCET criteria. There is less than 50. % stenosis in the left internal carotid artery utilizing  NASCET criteria. Impression IMPRESSION:  No flow-limiting stenosis. MRA BRAIN WO CONT    Narrative INDICATION: Dizziness on the left hippocampal diffusion restriction    COMPARISON:  MR performed 5/17/2019    TECHNIQUE:  3-D time-of-flight MRA of the brain was performed. Multiplanar  reconstructions were obtained. FINDINGS:    The A2 segments are within normal limits. There are A2 segments bilaterally. M1  segments are patent. M2 segments demonstrate symmetric arborization. The left  vertebral artery slightly larger than the right vertebral artery. P1 and  proximal P2 vessels are patent. . The basilar artery and its branches are normal.  The internal carotid, anterior cerebral, and middle cerebral arteries are  patent. Moderate stenosis of the P1/ proximal  segment on the left. . There is no  aneurysm. There are no sizable posterior communicating arteries.       Impression IMPRESSION:  No major vessel occlusion or intracranial aneurysm. Moderate stenosis of the P1 segment on the left. MRI CERV SPINE WO CONT    Narrative INDICATION:  b/l UE weakness     COMPARISON: CT May 14, 2019    TECHNIQUE: Multiplanar multisequence acquisition of the cervical spine. CONTRAST: None. FINDINGS:    ALIGNMENT:  Normal.  VERTEBRAL BODIES:  No compression fracture. MARROW SIGNAL: No significant edema. SPINAL CORD:  Abnormal T2 hyperintense signal centered in the spinal cord at  C3-4, with mild edema extending both superiorly and inferiorly. No  susceptibility artifact in this region on gradient echo sequence to suggest cord  hemorrhage. ADDITIONAL COMMENTS: Mild prevertebral soft tissue edema at C3-5. 1.2 cm cystic  nodule right thyroid lobe as seen on CT.    C2/3:   The spinal canal and foramina are widely patent. C3/4: Central disc protrusion indents the ventral spinal cord causing moderate  to severe spinal canal stenosis. Uncovertebral spurring results in mild  bilateral foraminal stenosis. C4/5:  Mild disc bulge and uncovertebral spurring. Minimal if any spinal canal  stenosis. Foramina are patent. C5/6:  Diffuse disc bulge and facet degeneration without significant spinal  canal or foraminal stenosis. C6/7:   The spinal canal and foramina are widely patent. C7/T1:   The spinal canal and foramina are widely patent. Impression IMPRESSION:  Cord compression and abnormal intramedullary signal at C3-4, due to central disc  osteophyte complex. Mild prevertebral edema at this level suggests this is  likely related to acute disc herniation, correlation is recommended. Underlying  cord lesion felt to be less likely though follow-up is recommended. No evidence  of cord hemorrhage. The findings were called to nurse Sanjuanita Ellison on 5/17/2019 at 11:20 AM by myself.                Lab Review   Recent Results (from the past 24 hour(s))   GLUCOSE, POC    Collection Time: 05/17/19  4:42 PM   Result Value Ref Range    Glucose (POC) 114 (H) 65 - 100 mg/dL    Performed by Palak SUAREZ        Additional comments:I reviewed the patients new imaging test results. and I reviewed the patient's other test results. Patient Vitals for the past 8 hrs:   BP Temp Pulse Resp SpO2   05/18/19 1155 186/90 98.6 °F (37 °C) 96 19 96 %   05/18/19 0832 173/69 -- 92 -- --   05/18/19 0715 185/79 97.7 °F (36.5 °C) 80 14 97 %       05/18 0701 - 05/18 1900  In: 528.8 [I.V.:528.8]  Out: 250 [Urine:250]  05/16 1901 - 05/18 0700  In: 7232.5 [I.V.:7232.5]  Out: 5000 [Urine:5000]    Exam:  Visit Vitals  /90 (BP 1 Location: Left arm, BP Patient Position: At rest)   Pulse 96   Temp 98.6 °F (37 °C)   Resp 19   Ht 5' 8\" (1.727 m)   Wt 72.3 kg (159 lb 6.3 oz)   SpO2 96%   BMI 24.24 kg/m²     Gen: Well developed  CV: RRR  Lungs: non labored breathing  Abd: soft, non distended  Neuro: A&O x 3, dysarthric   CN II-XII: PERRL, EOMI, face asymmetric, tongue/palate midline  Motor: Globally weak asymmetrically arms and legs 3-4/5  Sensory: Reduced in the feet  Gait: Deferred due to weakness    PROBLEM LIST:     Patient Active Problem List   Diagnosis Code    Alcohol intoxication delirium ([facility] [address] F10.121    Alcohol intoxication ([facility] [address] F10.929       Assessment/Plan:      59-year-old gentleman who was found down. Unclear if this was preceded by a true seizure. He does have MRI findings suggestive of this. Alcohol was also on board. I think it would be reasonable to maintain low-dose Keppra as is. Proceed with surgery as planned. I would recommend repeat MRI in about 1-3 months and he should follow-up with Dr. Cherry Mccann. I will follow peripherally. Please call if needed. Questions answered in detail for him and his wife present.       Signed:  [facility], DO  5/18/2019  12:27 PM

## 2019-05-19 LAB
APTT PPP: 27.2 SEC (ref 22.1–32)
INR PPP: 1.2 (ref 0.9–1.1)
PROTHROMBIN TIME: 11.6 SEC (ref 9–11.1)
THERAPEUTIC RANGE,PTTT: NORMAL SECS (ref 58–77)

## 2019-05-19 PROCEDURE — 74011250636 HC RX REV CODE- 250/636: Performed by: INTERNAL MEDICINE

## 2019-05-19 PROCEDURE — 74011250636 HC RX REV CODE- 250/636: Performed by: PSYCHIATRY & NEUROLOGY

## 2019-05-19 PROCEDURE — 36415 COLL VENOUS BLD VENIPUNCTURE: CPT

## 2019-05-19 PROCEDURE — 65660000000 HC RM CCU STEPDOWN

## 2019-05-19 PROCEDURE — 85610 PROTHROMBIN TIME: CPT

## 2019-05-19 PROCEDURE — 85730 THROMBOPLASTIN TIME PARTIAL: CPT

## 2019-05-19 PROCEDURE — 74011000250 HC RX REV CODE- 250: Performed by: INTERNAL MEDICINE

## 2019-05-19 PROCEDURE — 74011250637 HC RX REV CODE- 250/637: Performed by: INTERNAL MEDICINE

## 2019-05-19 PROCEDURE — 74011000258 HC RX REV CODE- 258: Performed by: PSYCHIATRY & NEUROLOGY

## 2019-05-19 PROCEDURE — C9113 INJ PANTOPRAZOLE SODIUM, VIA: HCPCS | Performed by: INTERNAL MEDICINE

## 2019-05-19 RX ORDER — HYDROCHLOROTHIAZIDE 25 MG/1
25 TABLET ORAL DAILY
Status: DISCONTINUED | OUTPATIENT
Start: 2019-05-20 | End: 2019-05-23

## 2019-05-19 RX ORDER — HEPARIN SODIUM 5000 [USP'U]/ML
5000 INJECTION, SOLUTION INTRAVENOUS; SUBCUTANEOUS EVERY 8 HOURS
Status: DISPENSED | OUTPATIENT
Start: 2019-05-19 | End: 2019-05-20

## 2019-05-19 RX ORDER — PANTOPRAZOLE SODIUM 40 MG/1
40 TABLET, DELAYED RELEASE ORAL DAILY
Status: DISCONTINUED | OUTPATIENT
Start: 2019-05-20 | End: 2019-05-23

## 2019-05-19 RX ADMIN — Medication 10 ML: at 21:23

## 2019-05-19 RX ADMIN — Medication 10 ML: at 06:31

## 2019-05-19 RX ADMIN — HEPARIN SODIUM 5000 UNITS: 5000 INJECTION INTRAVENOUS; SUBCUTANEOUS at 13:41

## 2019-05-19 RX ADMIN — DEXAMETHASONE SODIUM PHOSPHATE 6 MG: 10 INJECTION INTRAMUSCULAR; INTRAVENOUS at 08:20

## 2019-05-19 RX ADMIN — SODIUM CHLORIDE 500 MG: 900 INJECTION, SOLUTION INTRAVENOUS at 02:51

## 2019-05-19 RX ADMIN — MORPHINE SULFATE 2 MG: 2 INJECTION, SOLUTION INTRAMUSCULAR; INTRAVENOUS at 21:22

## 2019-05-19 RX ADMIN — DEXAMETHASONE SODIUM PHOSPHATE 6 MG: 10 INJECTION INTRAMUSCULAR; INTRAVENOUS at 02:51

## 2019-05-19 RX ADMIN — SODIUM CHLORIDE 40 MG: 9 INJECTION, SOLUTION INTRAMUSCULAR; INTRAVENOUS; SUBCUTANEOUS at 08:20

## 2019-05-19 RX ADMIN — HYDRALAZINE HYDROCHLORIDE 10 MG: 20 INJECTION INTRAMUSCULAR; INTRAVENOUS at 17:44

## 2019-05-19 RX ADMIN — Medication 10 ML: at 13:42

## 2019-05-19 RX ADMIN — FOLIC ACID: 5 INJECTION, SOLUTION INTRAMUSCULAR; INTRAVENOUS; SUBCUTANEOUS at 08:32

## 2019-05-19 RX ADMIN — HYDROCODONE BITARTRATE AND ACETAMINOPHEN 1 TABLET: 5; 325 TABLET ORAL at 08:20

## 2019-05-19 RX ADMIN — HYDROCHLOROTHIAZIDE 25 MG: 25 TABLET ORAL at 08:20

## 2019-05-19 RX ADMIN — HYDROCODONE BITARTRATE AND ACETAMINOPHEN 1 TABLET: 5; 325 TABLET ORAL at 01:17

## 2019-05-19 RX ADMIN — HEPARIN SODIUM 5000 UNITS: 5000 INJECTION INTRAVENOUS; SUBCUTANEOUS at 04:36

## 2019-05-19 RX ADMIN — DEXAMETHASONE SODIUM PHOSPHATE 6 MG: 10 INJECTION INTRAMUSCULAR; INTRAVENOUS at 13:42

## 2019-05-19 RX ADMIN — SODIUM CHLORIDE 500 MG: 900 INJECTION, SOLUTION INTRAVENOUS at 13:42

## 2019-05-19 RX ADMIN — HEPARIN SODIUM 5000 UNITS: 5000 INJECTION INTRAVENOUS; SUBCUTANEOUS at 20:36

## 2019-05-19 RX ADMIN — DEXAMETHASONE SODIUM PHOSPHATE 6 MG: 10 INJECTION INTRAMUSCULAR; INTRAVENOUS at 20:36

## 2019-05-19 NOTE — PROGRESS NOTES
Problem: Pressure Injury - Risk of  Goal: *Prevention of pressure injury  Description  Document Sha Scale and appropriate interventions in the flowsheet. Outcome: Progressing Towards Goal     Problem: Falls - Risk of  Goal: *Absence of Falls  Description  Document Edgar Brunner Fall Risk and appropriate interventions in the flowsheet. Outcome: Progressing Towards Goal     Bedside shift change report given to [facility] (oncoming nurse) by Cristine Kraft (offgoing nurse).  Report included the following information SBAR, Kardex, ED Summary, Intake/Output, MAR, Recent Results and Cardiac Rhythm SR.

## 2019-05-19 NOTE — PROGRESS NOTES
Problem: Pressure Injury - Risk of  Goal: *Prevention of pressure injury  Description  Document Sha Scale and appropriate interventions in the flowsheet. Outcome: Progressing Towards Goal     Problem: Falls - Risk of  Goal: *Absence of Falls  Description  Document [place] Fall Risk and appropriate interventions in the flowsheet.   Outcome: Progressing Towards Goal     Problem: Patient Education: Go to Patient Education Activity  Goal: Patient/Family Education  Outcome: Progressing Towards Goal     Problem: Alcohol Withdrawal  Goal: *STG: Remains safe in hospital  Outcome: Progressing Towards Goal     Problem: Delirium Treatment  Goal: *Emotional stability restored to baseline  Outcome: Progressing Towards Goal     Problem: General Wound Care  Goal: Interventions  Outcome: Progressing Towards Goal

## 2019-05-19 NOTE — PROGRESS NOTES
Camilo called for [facility] Care bed, pt has limited mobility and impaired skin integrity. Confirmation number is [id number].

## 2019-05-19 NOTE — PROGRESS NOTES
Bedside shift change report given to [facility] (oncoming nurse) by AURORA De Souza (offgoing nurse). Report included the following information SBAR, Kardex, Procedure Summary, MAR and Recent Results.
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
## 2019-05-19 NOTE — PROGRESS NOTES
HD 5  Afeb, BP elevated at times  Alert  Anti-gravity in bilat UE  Strength improved proximally>distally  A/P: central cord syndrome with spinal cord signal change at C3/4  Improving slightly on steroids   Plan for OR Tuesday am

## 2019-05-19 NOTE — PROGRESS NOTES
Problem: Pressure Injury - Risk of  Goal: *Prevention of pressure injury  Description  Document Sha Scale and appropriate interventions in the flowsheet. Outcome: Progressing Towards Goal   Daily skin assessment performed, buttocks blisters opened at this point, versa care ordered, PRN to chair, protective ointment applied. Problem: Falls - Risk of  Goal: *Absence of Falls  Description  Document Easter Getting Fall Risk and appropriate interventions in the flowsheet. Outcome: Progressing Towards   Patient able to get up with two nurses assistance, transfers to a chair, non skid socks on, ligts on, call bell within patient reach, hourly rounds      Problem: Alcohol Withdrawal  Goal: *STG: Participates in treatment plan  Outcome: Progressing Towards Goal    No signs of alcohol withdrawals, daily keppra, lactated ringers with vitamin B infusion - daily   Goal: *STG: Remains safe in hospital  Outcome: Progressing Towards Goal  Goal: *STG: Maintains appropriate nutrition and hydration  Outcome: Progressing Towards Goal  Dysphagia diet, able to consume 100% of daily foods      1559-Bedside shift change report given to [address] [place] [address]  (oncoming nurse) by Moises Arcos RN  (offgoing nurse). Report included the following information SBAR, Intake/Output, MAR and Recent Results.

## 2019-05-20 ENCOUNTER — ANESTHESIA EVENT (OUTPATIENT)
Dept: SURGERY | Age: 69
DRG: 028 | End: 2019-05-20
Payer: MEDICARE

## 2019-05-20 PROCEDURE — 74011250636 HC RX REV CODE- 250/636: Performed by: NEUROLOGICAL SURGERY

## 2019-05-20 PROCEDURE — 97110 THERAPEUTIC EXERCISES: CPT

## 2019-05-20 PROCEDURE — 74011250636 HC RX REV CODE- 250/636: Performed by: INTERNAL MEDICINE

## 2019-05-20 PROCEDURE — 74011000250 HC RX REV CODE- 250: Performed by: INTERNAL MEDICINE

## 2019-05-20 PROCEDURE — 74011250637 HC RX REV CODE- 250/637: Performed by: INTERNAL MEDICINE

## 2019-05-20 PROCEDURE — 65660000000 HC RM CCU STEPDOWN

## 2019-05-20 PROCEDURE — 74011250636 HC RX REV CODE- 250/636: Performed by: PSYCHIATRY & NEUROLOGY

## 2019-05-20 PROCEDURE — 74011000258 HC RX REV CODE- 258: Performed by: PSYCHIATRY & NEUROLOGY

## 2019-05-20 RX ORDER — FOLIC ACID 1 MG/1
1 TABLET ORAL DAILY
Status: DISCONTINUED | OUTPATIENT
Start: 2019-05-21 | End: 2019-06-01 | Stop reason: HOSPADM

## 2019-05-20 RX ORDER — DEXAMETHASONE SODIUM PHOSPHATE 4 MG/ML
4 INJECTION, SOLUTION INTRA-ARTICULAR; INTRALESIONAL; INTRAMUSCULAR; INTRAVENOUS; SOFT TISSUE EVERY 8 HOURS
Status: DISCONTINUED | OUTPATIENT
Start: 2019-05-20 | End: 2019-05-22

## 2019-05-20 RX ORDER — LANOLIN ALCOHOL/MO/W.PET/CERES
100 CREAM (GRAM) TOPICAL DAILY
Status: DISCONTINUED | OUTPATIENT
Start: 2019-05-21 | End: 2019-06-01 | Stop reason: HOSPADM

## 2019-05-20 RX ADMIN — HYDROCHLOROTHIAZIDE 25 MG: 25 TABLET ORAL at 09:43

## 2019-05-20 RX ADMIN — Medication 10 ML: at 21:00

## 2019-05-20 RX ADMIN — DEXAMETHASONE SODIUM PHOSPHATE 6 MG: 10 INJECTION INTRAMUSCULAR; INTRAVENOUS at 14:19

## 2019-05-20 RX ADMIN — DEXAMETHASONE SODIUM PHOSPHATE 6 MG: 10 INJECTION INTRAMUSCULAR; INTRAVENOUS at 09:43

## 2019-05-20 RX ADMIN — Medication 10 ML: at 14:00

## 2019-05-20 RX ADMIN — HYDRALAZINE HYDROCHLORIDE 10 MG: 20 INJECTION INTRAMUSCULAR; INTRAVENOUS at 12:34

## 2019-05-20 RX ADMIN — DEXAMETHASONE SODIUM PHOSPHATE 4 MG: 4 INJECTION, SOLUTION INTRA-ARTICULAR; INTRALESIONAL; INTRAMUSCULAR; INTRAVENOUS; SOFT TISSUE at 21:00

## 2019-05-20 RX ADMIN — MORPHINE SULFATE 2 MG: 2 INJECTION, SOLUTION INTRAMUSCULAR; INTRAVENOUS at 14:21

## 2019-05-20 RX ADMIN — HYDRALAZINE HYDROCHLORIDE 10 MG: 20 INJECTION INTRAMUSCULAR; INTRAVENOUS at 20:32

## 2019-05-20 RX ADMIN — FOLIC ACID: 5 INJECTION, SOLUTION INTRAMUSCULAR; INTRAVENOUS; SUBCUTANEOUS at 09:39

## 2019-05-20 RX ADMIN — SODIUM CHLORIDE 500 MG: 900 INJECTION, SOLUTION INTRAVENOUS at 14:33

## 2019-05-20 RX ADMIN — PANTOPRAZOLE SODIUM 40 MG: 40 TABLET, DELAYED RELEASE ORAL at 09:43

## 2019-05-20 RX ADMIN — Medication 10 ML: at 05:42

## 2019-05-20 RX ADMIN — HYDROCODONE BITARTRATE AND ACETAMINOPHEN 1 TABLET: 5; 325 TABLET ORAL at 21:00

## 2019-05-20 RX ADMIN — SODIUM CHLORIDE 500 MG: 900 INJECTION, SOLUTION INTRAVENOUS at 01:48

## 2019-05-20 RX ADMIN — DEXAMETHASONE SODIUM PHOSPHATE 6 MG: 10 INJECTION INTRAMUSCULAR; INTRAVENOUS at 01:48
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
## 2019-05-20 NOTE — PROGRESS NOTES
0800 - Bedside shift change report given to Shar S Cal Medrano (oncoming nurse) by Antoinette Lockhart (offgoing nurse). Report included the following information SBAR, Kardex, ED Summary, Intake/Output, MAR, Recent Results and Cardiac Rhythm SR. Problem: Pressure Injury - Risk of  Goal: *Prevention of pressure injury  Description  Document Sha Scale and appropriate interventions in the flowsheet. Outcome: Progressing Towards Goal   Patient turned regularly. Use of one sheet, one chux, and pillows to offload heels and buttocks. Problem: General Wound Care  Goal: Interventions  Outcome: Progressing Towards Goal   Mepilex used on blistered buttocks, zinc cream applied, aloe cream used.

## 2019-05-20 NOTE — WOUND CARE
Wound Consult:  New Patient Visit. Chart reviewed. Consulted for sunburn injuries POA. Spoke with patients nurseJohnathan to hand off on visit. Patient is resting on a Envision low air loss [facility]; has waffle cushion in place for sitting. For surgery tomorrow for central cord syndrome. Assessment:  Left buttock - ~ 9 x 5 x 0.1 cm area of un carmelita blistered area, epidermal sloughing; bright pink moist to dry base exposed, partial thickness sunburn. Right buttock - ~ 8 x 4 x 0.1 cm area of less extensive dermal exposure; epidermal sloughing with pink dry areas and scattered bright pink moist exposed dermis; superficial artial thickness sunburn POA. Has some epidermal sloughing with intact pale pink skin through gluteal cleft/sacral area. Entire back is bright red, intact. Has redness from sunburn that extends down posterior thighs. Heels without redness. Face appears red as well. Treatment:  Had Mepilex Border in place with some serous drainage from buttocks areas; this caused some discomfort when removed as skin under silicone border is so sensitive. Used Mepilex Transfer to both buttocks after cleansing with wound cleanser. Changed pad underneath and we turned and repositioned, heels floated. Mepilex Transfer to bilateral buttocks as needed; can resumed Mepilex Border as surrounding skin heels and is less affected by the adhesive. Cleanse gently with wound cleanser. Aquaphor as needed for any dry peeling skin on body. Skin Care Recommendations:  1. Minimize friction/shear: minimize layers of linen/pads under patient. Continue on air mattress. 2. Off load pressure/reposition: continue to turn and reposition approximately every 2 hours; float heels or use Prevalon boots; waffle cushion for sitting. 3. Manage Moisture - keep skin folds dry; incontinence skin care as needed; currently has justin.   4. Continue to monitor nutrition, pain, and skin risk scale, and skin assessment. Plan:  Spoke with Dr. Charo Xie regarding findings and obtained orders for treatment. We will continue to reassess routinely and as needed.   Adria Chavez, [address] [facility] Office [phone]  Pager [phone]

## 2019-05-20 NOTE — PROGRESS NOTES
Speech pathology note  Reviewed chart and discussed case with RN. Attempted to see patient for dysphagia treatment, however he reported that his swallow function has improved and politely declined SLP treatment. Will continue to follow for diet tolerance and liberalization, as well as re-evaluation of swallow function after surgery. Thank you.     Howard Dickey., CCC-SLP

## 2019-05-20 NOTE — PROGRESS NOTES
Hospitalist Progress Note  Betzy Deleon MD  Answering service: [phone] from in house phone  Cell:       Date of Service:  2019  NAME:  Mulu Flaherty  :  1950  MRN:  [id number]      Admission Summary:   21-year-old man with a past medical history significant for hypertension, gastroesophageal reflux disease, Meniere's disease, who was in his usual state of health until the day of his presentation at the emergency room when the patient developed a change in mental status. The spouse stated that she was out of the house and when she came back found the patient laying prone in the backyard unresponsive. It is not clear how long the patient had been lying prone in the backyard. When the EMS arrived at the scene, it was reported that the patient was verbally responsive to the EMS crew that he had too much alcohol. The patient was administering alcohol by rectal enema stating that he wanted to try something new. The patient has no history of alcohol abuse according to his spouse, but today the patient had too much alcohol than usual.  The patient was brought to the emergency room for further evaluation. When the patient arrived at the emergency room, the patient remained lethargic, but easily arousable and also with verbal response. CT scan of the head was obtained, this was negative. The patient's alcohol level was 162. The patient was referred to the Hospitalist Service for evaluation for admission.   No record of prior admission to this hospital        Interval history / Subjective:       Patient seen bedside with extensive discussions with  wife bedside     Still has weakness and tingling of the the upper extremities     Face is all flushed and red  But improved than yesterday     Complains of throat pain but without any fever  Again and want me to take c/s from throat but no erythema or fever  on exam , so culture not indicated            Assessment & Plan:       Delirium and fall , resolved on 5/18   Downtime of 5 hours on the floor , MRI shows hippocampus restricted diffusion , nola suggestive of seizure like activity ? 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 , will ask neuro to comment on it. Also initially though delirium due to alcohol intoxication also , alcohol level of 162    EEG , no seizure , started on keppra by neuro though for now   Echo done , needs to be read !       Seizure like activity   Started on keppra by neuro   MRI shows hippocampus restricted diffusion , likley suggestive of seizure like activity       leukocytosis and lactic acidosis on admission    likely reactive , may be had seizure , it is not SIRS , no infection     B/L upper extremity weakness and tingling in arms with neck and shoulder pain POA but  More evident after he woke up later in hospitalization   MRI cervical spine with central cord compression ,   C3/4 due to central disc herniation   Neurosurgery saw patient and plan is for decompression on 5/21     HTN   On prn hydralazine   Restart HCTZ , his home med as BP on higher side , likley from steroids   I wanted to start another agent but spouse wanted me to wait and see given risk of hypotension post op , makes sense   Use prn meds     Alcohol intoxication and now risk of withdrawal   CIWA protocol   Resolved       On full liquids , SLP to follow   Kirsty , remove post op if possible       Code status:  Full   DVT prophylaxis: heparin s/q, I placed stop date for  today    Care Plan discussed with: Patient/Family  Disposition: TBD     Hospital Problems  Date Reviewed: 5/15/2019          Codes Class Noted POA    * (Principal) Alcohol intoxication delirium ([facility] [address] ICD-10-CM: U85.578  ICD-9-CM: 291.0  5/15/2019 Yes        Alcohol intoxication ([facility] [address] ICD-10-CM: D03.906  ICD-9-CM: 305.00  5/15/2019 Unknown                Review of Systems:   A comprehensive review of systems was negative except for that written in the HPI. Vital Signs:    Last 24hrs VS reviewed since prior progress note. Most recent are:  Visit Vitals  /74   Pulse 81   Temp 98.7 °F (37.1 °C)   Resp 15   Ht 5' 8\" (1.727 m)   Wt 73.5 kg (162 lb 1.6 oz)   SpO2 96%   BMI 24.65 kg/m²         Intake/Output Summary (Last 24 hours) at 5/20/2019 1057  Last data filed at 5/20/2019 0677  Gross per 24 hour   Intake 2264.5 ml   Output 3050 ml   Net -785.5 ml        Physical Examination:             Constitutional:   Awake and feels better , face all flushed ,    ENT:  Oral mucous moist, oropharynx benign. Neck supple,    Resp:  CTA bilaterally. No wheezing/rhonchi/rales. No accessory muscle use   CV:  Regular rhythm, normal rate, no murmurs, gallops, rubs    GI:  Soft, non distended, non tender. normoactive bowel sounds, no hepatosplenomegaly     Musculoskeletal:  No edema, warm, 2+ pulses throughout    Neurologic:  upper extremity 3-4/5 b/l             Data Review:    Review and/or order of clinical lab test      Labs:     No results for input(s): WBC, HGB, HCT, PLT, HGBEXT, HCTEXT, PLTEXT, HGBEXT, HCTEXT, PLTEXT in the last 72 hours. No results for input(s): NA, K, CL, CO2, BUN, CREA, GLU, CA, MG, PHOS, URICA in the last 72 hours. No results for input(s): SGOT, GPT, ALT, AP, TBIL, TBILI, TP, ALB, GLOB, GGT, AML, LPSE in the last 72 hours. No lab exists for component: AMYP, HLPSE  Recent Labs     05/19/19  0945   INR 1.2*   PTP 11.6*   APTT 27.2      No results for input(s): FE, TIBC, PSAT, FERR in the last 72 hours. No results found for: FOL, RBCF   No results for input(s): PH, PCO2, PO2 in the last 72 hours. No results for input(s): CPK, CKNDX, TROIQ in the last 72 hours.     No lab exists for component: CPKMB  Lab Results   Component Value Date/Time    Cholesterol, total 129 05/15/2019 01:54 AM    HDL Cholesterol 54 05/15/2019 01:54 AM    LDL, calculated 65 05/15/2019 01:54 AM    Triglyceride 50 05/15/2019 01:54 AM CHOL/HDL Ratio 2.4 05/15/2019 01:54 AM     Lab Results   Component Value Date/Time    Glucose (POC) 148 (H) 05/18/2019 04:47 PM    Glucose (POC) 170 (H) 05/18/2019 12:37 PM    Glucose (POC) 114 (H) 05/17/2019 04:42 PM    Glucose (POC) 82 05/17/2019 12:11 PM    Glucose (POC) 88 05/17/2019 06:25 AM     Lab Results   Component Value Date/Time    Color YELLOW/STRAW 05/14/2019 08:14 PM    Appearance CLEAR 05/14/2019 08:14 PM    Specific gravity 1.016 05/14/2019 08:14 PM    pH (UA) 5.5 05/14/2019 08:14 PM    Protein NEGATIVE  05/14/2019 08:14 PM    Glucose NEGATIVE  05/14/2019 08:14 PM    Ketone 15 (A) 05/14/2019 08:14 PM    Bilirubin NEGATIVE  05/14/2019 08:14 PM    Urobilinogen 0.2 05/14/2019 08:14 PM    Nitrites NEGATIVE  05/14/2019 08:14 PM    Leukocyte Esterase NEGATIVE  05/14/2019 08:14 PM         Medications Reviewed:     Current Facility-Administered Medications   Medication Dose Route Frequency    pantoprazole (PROTONIX) tablet 40 mg  40 mg Oral DAILY    heparin (porcine) injection 5,000 Units  5,000 Units SubCUTAneous Q8H    hydroCHLOROthiazide (HYDRODIURIL) tablet 25 mg  25 mg Oral DAILY    hydrALAZINE (APRESOLINE) 20 mg/mL injection 10 mg  10 mg IntraVENous Q4H PRN    metoprolol (LOPRESSOR) injection 2.5 mg  2.5 mg IntraVENous Q6H PRN    dexamethasone (PF) (DECADRON) injection 6 mg  6 mg IntraVENous Q6H    levETIRAcetam (KEPPRA) 500 mg in 0.9% sodium chloride 100 mL IVPB  500 mg IntraVENous Q12H    morphine injection 2 mg  2 mg IntraVENous Q6H PRN    sodium chloride (NS) flush 5-40 mL  5-40 mL IntraVENous Q8H    sodium chloride (NS) flush 5-40 mL  5-40 mL IntraVENous PRN    ondansetron (ZOFRAN) injection 4 mg  4 mg IntraVENous Q4H PRN    naloxone (NARCAN) injection 0.4 mg  0.4 mg IntraVENous PRN    bisacodyl (DULCOLAX) tablet 5 mg  5 mg Oral DAILY PRN    lactated Ringers 3,119 mL with folic acid 1 mg, thiamine 100 mg, mvi, adult no. 4 with vit K 10 mL infusion   IntraVENous DAILY    acetaminophen (TYLENOL) tablet 650 mg  650 mg Oral Q4H PRN    lidocaine (LIDODERM) 5 % patch 1 Patch  1 Patch TransDERmal Q24H    prochlorperazine (COMPAZINE) tablet 10 mg  10 mg Oral Q6H PRN    HYDROcodone-acetaminophen (NORCO) 5-325 mg per tablet 1 Tab  1 Tab Oral Q6H PRN    phenol throat spray (CHLORASEPTIC) 1 Spray  1 Spray Oral PRN     ______________________________________________________________________  EXPECTED LENGTH OF STAY: 3d 9h  ACTUAL LENGTH OF STAY:          5                 Jacek Juarez MD

## 2019-05-20 NOTE — PROGRESS NOTES
Spiritual Care Assessment/Progress Note  [facility]      NAME: Jason Miller      MRN: [id number]  AGE: 78 y.o. SEX: male  Oriental orthodox Affiliation: Jainism   Language: English     5/20/2019     Total Time (in minutes): 5     Spiritual Assessment begun in [facility] IMCU through conversation with:         []Patient        [] Family    [] Friend(s)        Reason for Consult: Initial/Spiritual assessment, patient floor     Spiritual beliefs: (Please include comment if needed)     [] Identifies with a alexandra tradition:         [] Supported by a alexandra community:            [] Claims no spiritual orientation:           [] Seeking spiritual identity:                [] Adheres to an individual form of spirituality:           [x] Not able to assess:                           Identified resources for coping:      [] Prayer                               [] Music                  [] Guided Imagery     [] Family/friends                 [] Pet visits     [] Devotional reading                         [x] Unknown     [] Other:                                               Interventions offered during this visit: (See comments for more details)    Patient Interventions: Spiritual care volunteer support           Plan of Care:     [] Support spiritual and/or cultural needs    [] Support AMD and/or advance care planning process      [] Support grieving process   [] Coordinate Rites and/or Rituals    [] Coordination with community clergy   [] No spiritual needs identified at this time   [] Detailed Plan of Care below (See Comments)  [] Make referral to Music Therapy  [] Make referral to Pet Therapy     [] Make referral to Addiction services  [] Make referral to [facility]  [] Make referral to Spiritual Care Partner  [] No future visits requested        [x] Follow up visits as needed     Comments:  visit for initial spiritual assessment. Staff with patient providing care.   Will continue to follow up as needed and upon request as able. Visited by Rev. Misty English, [address] [place] [address] Road paging service: [phone])

## 2019-05-20 NOTE — PROGRESS NOTES
Patient having surgery tomorrow for central cord compression. Has not yet been evaluated by OT. Would be most appropriate to evaluate s/p surgery to best determined his needs.

## 2019-05-20 NOTE — CONSULTS
Neurology Progress Note    Patient ID:  Pablo Wynn  [id number]  78 y.o.  1950    Chief Complaint: Found down, possible seizure    Subjective:     76-year-old gentleman who was found down. He has a central cord syndrome with pending C-spine surgery tomorrow. I evaluated him a few days ago in follow-up. He has restricted diffusion in the left medial hippocampus presumably due to seizure activity. The next day he had an MRA completed showing left PCA stenosis but no occlusion. He was not on aspirin prior to presentation.   No acute changes since I saw him last.    Objective:       ROS:  Per HPI  All other 12 pt ROS negative  Remains globally weak    Meds:  Current Facility-Administered Medications   Medication Dose Route Frequency    [START ON 1/72/2026] folic acid (FOLVITE) tablet 1 mg  1 mg Oral DAILY    [START ON 5/21/2019] thiamine HCL (B-1) tablet 100 mg  100 mg Oral DAILY    pantoprazole (PROTONIX) tablet 40 mg  40 mg Oral DAILY    heparin (porcine) injection 5,000 Units  5,000 Units SubCUTAneous Q8H    hydroCHLOROthiazide (HYDRODIURIL) tablet 25 mg  25 mg Oral DAILY    hydrALAZINE (APRESOLINE) 20 mg/mL injection 10 mg  10 mg IntraVENous Q4H PRN    metoprolol (LOPRESSOR) injection 2.5 mg  2.5 mg IntraVENous Q6H PRN    dexamethasone (PF) (DECADRON) injection 6 mg  6 mg IntraVENous Q6H    levETIRAcetam (KEPPRA) 500 mg in 0.9% sodium chloride 100 mL IVPB  500 mg IntraVENous Q12H    morphine injection 2 mg  2 mg IntraVENous Q6H PRN    sodium chloride (NS) flush 5-40 mL  5-40 mL IntraVENous Q8H    sodium chloride (NS) flush 5-40 mL  5-40 mL IntraVENous PRN    ondansetron (ZOFRAN) injection 4 mg  4 mg IntraVENous Q4H PRN    naloxone (NARCAN) injection 0.4 mg  0.4 mg IntraVENous PRN    bisacodyl (DULCOLAX) tablet 5 mg  5 mg Oral DAILY PRN    acetaminophen (TYLENOL) tablet 650 mg  650 mg Oral Q4H PRN    lidocaine (LIDODERM) 5 % patch 1 Patch  1 Patch TransDERmal Q24H    prochlorperazine (COMPAZINE) tablet 10 mg  10 mg Oral Q6H PRN    HYDROcodone-acetaminophen (NORCO) 5-325 mg per tablet 1 Tab  1 Tab Oral Q6H PRN    phenol throat spray (CHLORASEPTIC) 1 Spray  1 Spray Oral PRN       MRI Results (maximum last 3): Results from Hospital Encounter encounter on 05/14/19   MRA NECK WO CONT    Narrative INDICATION: Diffusion restriction in the left hippocampus, left posterior  cerebral artery P1 segment stenosis. COMPARISON:  None  TECHNIQUE: 2-D time-of-flight MRA of the neck was performed. Multiplanar  reconstructions were obtained. FINDINGS:  The visualized common carotid, and internal carotid arteries are patent with no  flow-limiting stenosis. The vertebral arteries are codominant and patent. There is less than 50. % stenosis in the right internal carotid artery utilizing  NASCET criteria. There is less than 50. % stenosis in the left internal carotid artery utilizing  NASCET criteria. Impression IMPRESSION:  No flow-limiting stenosis. MRA BRAIN WO CONT    Narrative INDICATION: Dizziness on the left hippocampal diffusion restriction    COMPARISON:  MR performed 5/17/2019    TECHNIQUE:  3-D time-of-flight MRA of the brain was performed. Multiplanar  reconstructions were obtained. FINDINGS:    The A2 segments are within normal limits. There are A2 segments bilaterally. M1  segments are patent. M2 segments demonstrate symmetric arborization. The left  vertebral artery slightly larger than the right vertebral artery. P1 and  proximal P2 vessels are patent. . The basilar artery and its branches are normal.  The internal carotid, anterior cerebral, and middle cerebral arteries are  patent. Moderate stenosis of the P1/ proximal  segment on the left. . There is no  aneurysm. There are no sizable posterior communicating arteries. Impression IMPRESSION:  No major vessel occlusion or intracranial aneurysm. Moderate stenosis of the P1 segment on the left.    MRI CERV SPINE WO CONT Narrative INDICATION:  b/l UE weakness     COMPARISON: CT May 14, 2019    TECHNIQUE: Multiplanar multisequence acquisition of the cervical spine. CONTRAST: None. FINDINGS:    ALIGNMENT:  Normal.  VERTEBRAL BODIES:  No compression fracture. MARROW SIGNAL: No significant edema. SPINAL CORD:  Abnormal T2 hyperintense signal centered in the spinal cord at  C3-4, with mild edema extending both superiorly and inferiorly. No  susceptibility artifact in this region on gradient echo sequence to suggest cord  hemorrhage. ADDITIONAL COMMENTS: Mild prevertebral soft tissue edema at C3-5. 1.2 cm cystic  nodule right thyroid lobe as seen on CT.    C2/3:   The spinal canal and foramina are widely patent. C3/4: Central disc protrusion indents the ventral spinal cord causing moderate  to severe spinal canal stenosis. Uncovertebral spurring results in mild  bilateral foraminal stenosis. C4/5:  Mild disc bulge and uncovertebral spurring. Minimal if any spinal canal  stenosis. Foramina are patent. C5/6:  Diffuse disc bulge and facet degeneration without significant spinal  canal or foraminal stenosis. C6/7:   The spinal canal and foramina are widely patent. C7/T1:   The spinal canal and foramina are widely patent. Impression IMPRESSION:  Cord compression and abnormal intramedullary signal at C3-4, due to central disc  osteophyte complex. Mild prevertebral edema at this level suggests this is  likely related to acute disc herniation, correlation is recommended. Underlying  cord lesion felt to be less likely though follow-up is recommended. No evidence  of cord hemorrhage. The findings were called to nurse Becca Watt on 5/17/2019 at 11:20 AM by myself. Lab Review No results found for this or any previous visit (from the past 24 hour(s)). Additional comments:I reviewed the patients new imaging test results.      Patient Vitals for the past 8 hrs:   BP Temp Pulse Resp SpO2   05/20/19 0939 172/74 -- 81 -- --   05/20/19 0755 170/84 98.7 °F (37.1 °C) 74 15 96 %       No intake/output data recorded. 05/18 1901 - 05/20 0700  In: 2264.5 [P.O.:960; I.V.:1304.5]  Out: 4050 [Urine:4050]    Exam:  Visit Vitals  /74   Pulse 81   Temp 98.7 °F (37.1 °C)   Resp 15   Ht 5' 8\" (1.727 m)   Wt 73.5 kg (162 lb 1.6 oz)   SpO2 96%   BMI 24.65 kg/m²     Gen: Well developed  CV: RRR  Lungs: non labored breathing  Abd: soft, non distended  Neuro: A&O x 3, dysarthric   CN II-XII: PERRL, EOMI, face asymmetric, tongue/palate midline  Motor: Globally weak asymmetrically arms and legs 3-4/5  Sensory: Reduced in the feet  Gait: Deferred due to weakness        PROBLEM LIST:     Patient Active Problem List   Diagnosis Code    Alcohol intoxication delirium ([address] F10.121    Alcohol intoxication ([facility] [address] F10.929       Assessment/Plan:      69-year-old gentleman who presented after being found down with possible seizure activity. I looked again at the imaging. He does have restricted diffusion in the left medial hippocampus which could be related to seizure activity. However, since the MRA the next day suggests left PCA stenosis it is possible that could be an acute stroke as well. There is mild ADC correlate. Because of this I do think after he is cleared from surgery he should start taking baby aspirin daily as part of stroke prevention. Check TTE. I discussed the aforementioned with him and his wife at the bedside. I want him to stay on Keppra for now until he follows up with Dr. Rossi Gallagher. Please call if needed. During this evaluation, we also discussed stroke education to include signs and symptoms of stroke and TIA.        Signed:  Mee Salamanca DO  5/20/2019  11:41 AM

## 2019-05-20 NOTE — PROGRESS NOTES
HD 6  Afeb, BP elevated at times  Alert  Anti-gravity in bilat UE  Strength improved proximally>distally  A/P: central cord syndrome with spinal cord signal change at C3/4  Improving slightly on steroids   Plan for OR Tuesday am

## 2019-05-20 NOTE — PROGRESS NOTES
Problem: General Wound Care  Goal: *Non-infected wound: Improvement of existing wound, absence of infection, and maintenance of skin integrity  Outcome: Progressing Towards Goal   Wound care in to see patient for blisters on buttocks. Mepilex transfer dressing applied. Will apply aloe vera cream and Aquaphor over sunburnt areas as needed. PRN morphine given for shoulder pain and pain around wounds. 1906: Pre-op CHG bath completed. 1915: Consent for operation obtained and placed in patient's chart. 1950: Bedside shift change report given to Aden Nash RN (oncoming nurse) by Daily Crouch RN (offgoing nurse). Report included the following information SBAR, Kardex, Intake/Output, MAR, Accordion, Recent Results and Cardiac Rhythm NSR.

## 2019-05-20 NOTE — PROGRESS NOTES
Problem: Mobility Impaired (Adult and Pediatric)  Goal: *Acute Goals and Plan of Care (Insert Text)  Description  Physical Therapy Goals  Initiated 5/18/2019  1. Patient will move from supine to sit and sit to supine , scoot up and down and roll side to side in bed with minimal assistance/contact guard assist within 7 day(s). 2.  Patient will transfer from bed to chair and chair to bed with minimal assistance/contact guard assist using the least restrictive device within 7 day(s). 3.  Patient will perform sit to stand with minimal assistance/contact guard assist within 7 day(s). 4.  Patient will ambulate with minimal assistance/contact guard assist for 150 feet with the least restrictive device within 7 day(s). 5.  Patient will participate in a strengthening program and be independent within 7 days. Outcome: Progressing Towards Goal    PHYSICAL THERAPY TREATMENT  Patient: Thaddeus Box (51 y.o. male)  Date: 5/20/2019  Diagnosis: Alcohol intoxication ([facility] [address] [F10.929] Alcohol intoxication delirium ([facility] [address]  Procedure(s) (LRB):  C3-4 ANTERIOR CERVICAL DISCECTOMY FUSION (N/A)    Precautions:  fall  Chart, physical therapy assessment, plan of care and goals were reviewed. ASSESSMENT:  Pt received in supine agreeable to PT, ex offered. He participated in the following ex 10 reps bilaterally: ankle pumps, quad sets, hamstring sets, heel slides, hip abd/add, and assisted hip internal/external rotation, and glut sets. Plan is for surgery tomorrow for central cord syndrome with spinal cord signal change at C 3/4. Progression toward goals:  ?    Improving appropriately and progressing toward goals  ? Improving slowly and progressing toward goals  ? Not making progress toward goals and plan of care will be adjusted     PLAN:  Patient continues to benefit from skilled intervention to address the above impairments. Continue treatment per established plan of care.   Discharge Recommendations:  Rehab and To Be Determined post surgery  Further Equipment Recommendations for Discharge:  to be determined      SUBJECTIVE:   Patient stated ? I didn't get much sleep last night.?    OBJECTIVE DATA SUMMARY:   Chart checked, pt cleared by nursing  Critical Behavior:  Neurologic State: Alert  Orientation Level: Oriented X4  Cognition: Appropriate for age attention/concentration, Follows commands  Safety/Judgement: Awareness of environment  Functional Mobility Training:  Bed Mobility:                    Transfers:                                   Balance:     Ambulation/Gait Training:                                                        Stairs:              Neuro Re-Education:    Therapeutic Exercises:   See assessment above  Pain:  Pain Scale 1: Numeric (0 - 10)  Pain Intensity 1: 0  Pain Location 1: Shoulder;Back  Pain Orientation 1: Left;Right; Lower  Pain Description 1: Aching  Pain Intervention(s) 1: Medication (see MAR)  Activity Tolerance:   VSS  After treatment:   ?    Patient left in no apparent distress sitting up in chair  ? Patient left in no apparent distress in bed  ? Call bell left within reach  ? Nursing notified  ? Caregiver present  ?     Bed alarm activated    COMMUNICATION/COLLABORATION:   The patient?s plan of care was discussed with: Registered Nurse    George Alcantara   Time Calculation: 11 mins

## 2019-05-21 ENCOUNTER — APPOINTMENT (OUTPATIENT)
Dept: GENERAL RADIOLOGY | Age: 69
DRG: 028 | End: 2019-05-21
Attending: NEUROLOGICAL SURGERY
Payer: MEDICARE

## 2019-05-21 ENCOUNTER — ANESTHESIA (OUTPATIENT)
Dept: SURGERY | Age: 69
DRG: 028 | End: 2019-05-21
Payer: MEDICARE

## 2019-05-21 PROCEDURE — 74011250637 HC RX REV CODE- 250/637: Performed by: HOSPITALIST

## 2019-05-21 PROCEDURE — 74011000272 HC RX REV CODE- 272: Performed by: NEUROLOGICAL SURGERY

## 2019-05-21 PROCEDURE — 65660000000 HC RM CCU STEPDOWN

## 2019-05-21 PROCEDURE — 77030003666 HC NDL SPINAL BD -A: Performed by: NEUROLOGICAL SURGERY

## 2019-05-21 PROCEDURE — 77030032490 HC SLV COMPR SCD KNE COVD -B: Performed by: NEUROLOGICAL SURGERY

## 2019-05-21 PROCEDURE — 74011000250 HC RX REV CODE- 250

## 2019-05-21 PROCEDURE — C1713 ANCHOR/SCREW BN/BN,TIS/BN: HCPCS | Performed by: NEUROLOGICAL SURGERY

## 2019-05-21 PROCEDURE — 74011250636 HC RX REV CODE- 250/636: Performed by: HOSPITALIST

## 2019-05-21 PROCEDURE — 76010000171 HC OR TIME 2 TO 2.5 HR INTENSV-TIER 1: Performed by: NEUROLOGICAL SURGERY

## 2019-05-21 PROCEDURE — 76060000036 HC ANESTHESIA 2.5 TO 3 HR: Performed by: NEUROLOGICAL SURGERY

## 2019-05-21 PROCEDURE — 77030038600 HC TU BPLR IRR DISP STRY -B: Performed by: NEUROLOGICAL SURGERY

## 2019-05-21 PROCEDURE — 77030004391 HC BUR FLUT MEDT -C: Performed by: NEUROLOGICAL SURGERY

## 2019-05-21 PROCEDURE — 74011000250 HC RX REV CODE- 250: Performed by: INTERNAL MEDICINE

## 2019-05-21 PROCEDURE — 77030018836 HC SOL IRR NACL ICUM -A: Performed by: NEUROLOGICAL SURGERY

## 2019-05-21 PROCEDURE — 77030029099 HC BN WAX SSPC -A: Performed by: NEUROLOGICAL SURGERY

## 2019-05-21 PROCEDURE — 72040 X-RAY EXAM NECK SPINE 2-3 VW: CPT

## 2019-05-21 PROCEDURE — 77030014647 HC SEAL FBRN TISSL BAXT -D: Performed by: NEUROLOGICAL SURGERY

## 2019-05-21 PROCEDURE — 74011250636 HC RX REV CODE- 250/636

## 2019-05-21 PROCEDURE — 77030026438 HC STYL ET INTUB CARD -A: Performed by: NURSE ANESTHETIST, CERTIFIED REGISTERED

## 2019-05-21 PROCEDURE — 77030040424 HC CGE CERV SPN ANATOMIC PTC MEDT -G: Performed by: NEUROLOGICAL SURGERY

## 2019-05-21 PROCEDURE — 77030003029 HC SUT VCRL J&J -B: Performed by: NEUROLOGICAL SURGERY

## 2019-05-21 PROCEDURE — 74011000258 HC RX REV CODE- 258: Performed by: PSYCHIATRY & NEUROLOGY

## 2019-05-21 PROCEDURE — 74011250636 HC RX REV CODE- 250/636: Performed by: NEUROLOGICAL SURGERY

## 2019-05-21 PROCEDURE — 77030020782 HC GWN BAIR PAWS FLX 3M -B

## 2019-05-21 PROCEDURE — 77030002933 HC SUT MCRYL J&J -A: Performed by: NEUROLOGICAL SURGERY

## 2019-05-21 PROCEDURE — 77030012890

## 2019-05-21 PROCEDURE — 74011250636 HC RX REV CODE- 250/636: Performed by: ANESTHESIOLOGY

## 2019-05-21 PROCEDURE — 77030011267 HC ELECTRD BLD COVD -A: Performed by: NEUROLOGICAL SURGERY

## 2019-05-21 PROCEDURE — 74011250637 HC RX REV CODE- 250/637: Performed by: NEUROLOGICAL SURGERY

## 2019-05-21 PROCEDURE — 77030008684 HC TU ET CUF COVD -B: Performed by: NURSE ANESTHETIST, CERTIFIED REGISTERED

## 2019-05-21 PROCEDURE — 74011250636 HC RX REV CODE- 250/636: Performed by: PSYCHIATRY & NEUROLOGY

## 2019-05-21 PROCEDURE — 74011250637 HC RX REV CODE- 250/637: Performed by: INTERNAL MEDICINE

## 2019-05-21 PROCEDURE — 76210000016 HC OR PH I REC 1 TO 1.5 HR: Performed by: NEUROLOGICAL SURGERY

## 2019-05-21 PROCEDURE — 77030021678 HC GLIDESCP STAT DISP VERT -B: Performed by: ANESTHESIOLOGY

## 2019-05-21 PROCEDURE — 74011000250 HC RX REV CODE- 250: Performed by: NEUROLOGICAL SURGERY

## 2019-05-21 DEVICE — SCREW 3120415 4.0 X 15 SELF DRILL FIX
Type: IMPLANTABLE DEVICE | Site: SPINE CERVICAL | Status: FUNCTIONAL
Brand: ATLANTIS® ANTERIOR CERVICAL PLATE SYSTEM

## 2019-05-21 DEVICE — GRAFT BNE SUB SM CANC FRZN MORSELIZED W/ VIABLE CELL: Type: IMPLANTABLE DEVICE | Site: NECK | Status: FUNCTIONAL

## 2019-05-21 DEVICE — CAGE 5030641 ANATOMIC PTC 14X11X6MM
Type: IMPLANTABLE DEVICE | Site: SPINE CERVICAL | Status: FUNCTIONAL
Brand: ANATOMIC PEEK PTC CERVICAL FUSION SYSTEM

## 2019-05-21 RX ORDER — ROPIVACAINE HYDROCHLORIDE 5 MG/ML
150 INJECTION, SOLUTION EPIDURAL; INFILTRATION; PERINEURAL AS NEEDED
Status: DISCONTINUED | OUTPATIENT
Start: 2019-05-21 | End: 2019-05-21 | Stop reason: HOSPADM

## 2019-05-21 RX ORDER — ONDANSETRON 2 MG/ML
INJECTION INTRAMUSCULAR; INTRAVENOUS AS NEEDED
Status: DISCONTINUED | OUTPATIENT
Start: 2019-05-21 | End: 2019-05-21 | Stop reason: HOSPADM

## 2019-05-21 RX ORDER — DEXAMETHASONE SODIUM PHOSPHATE 4 MG/ML
INJECTION, SOLUTION INTRA-ARTICULAR; INTRALESIONAL; INTRAMUSCULAR; INTRAVENOUS; SOFT TISSUE AS NEEDED
Status: DISCONTINUED | OUTPATIENT
Start: 2019-05-21 | End: 2019-05-21 | Stop reason: HOSPADM

## 2019-05-21 RX ORDER — LIDOCAINE HYDROCHLORIDE 10 MG/ML
0.1 INJECTION, SOLUTION EPIDURAL; INFILTRATION; INTRACAUDAL; PERINEURAL AS NEEDED
Status: DISCONTINUED | OUTPATIENT
Start: 2019-05-21 | End: 2019-05-21 | Stop reason: HOSPADM

## 2019-05-21 RX ORDER — MIDAZOLAM HYDROCHLORIDE 1 MG/ML
0.5 INJECTION, SOLUTION INTRAMUSCULAR; INTRAVENOUS
Status: DISCONTINUED | OUTPATIENT
Start: 2019-05-21 | End: 2019-05-21 | Stop reason: SDUPTHER

## 2019-05-21 RX ORDER — SODIUM CHLORIDE 0.9 % (FLUSH) 0.9 %
5-40 SYRINGE (ML) INJECTION EVERY 8 HOURS
Status: DISCONTINUED | OUTPATIENT
Start: 2019-05-21 | End: 2019-06-01 | Stop reason: HOSPADM

## 2019-05-21 RX ORDER — MAGNESIUM SULFATE HEPTAHYDRATE 40 MG/ML
INJECTION, SOLUTION INTRAVENOUS AS NEEDED
Status: DISCONTINUED | OUTPATIENT
Start: 2019-05-21 | End: 2019-05-21 | Stop reason: HOSPADM

## 2019-05-21 RX ORDER — CEFAZOLIN SODIUM/WATER 2 G/20 ML
2 SYRINGE (ML) INTRAVENOUS EVERY 8 HOURS
Status: COMPLETED | OUTPATIENT
Start: 2019-05-21 | End: 2019-05-22

## 2019-05-21 RX ORDER — DIPHENHYDRAMINE HYDROCHLORIDE 50 MG/ML
12.5 INJECTION, SOLUTION INTRAMUSCULAR; INTRAVENOUS AS NEEDED
Status: DISCONTINUED | OUTPATIENT
Start: 2019-05-21 | End: 2019-05-21 | Stop reason: HOSPADM

## 2019-05-21 RX ORDER — GLYCOPYRROLATE 0.2 MG/ML
INJECTION INTRAMUSCULAR; INTRAVENOUS AS NEEDED
Status: DISCONTINUED | OUTPATIENT
Start: 2019-05-21 | End: 2019-05-21 | Stop reason: HOSPADM

## 2019-05-21 RX ORDER — NALOXONE HYDROCHLORIDE 0.4 MG/ML
0.4 INJECTION, SOLUTION INTRAMUSCULAR; INTRAVENOUS; SUBCUTANEOUS AS NEEDED
Status: DISCONTINUED | OUTPATIENT
Start: 2019-05-21 | End: 2019-06-01 | Stop reason: HOSPADM

## 2019-05-21 RX ORDER — OXYCODONE HYDROCHLORIDE 5 MG/1
5 TABLET ORAL
Status: DISCONTINUED | OUTPATIENT
Start: 2019-05-21 | End: 2019-06-01 | Stop reason: HOSPADM

## 2019-05-21 RX ORDER — ACETAMINOPHEN 650 MG/1
650 SUPPOSITORY RECTAL
Status: DISCONTINUED | OUTPATIENT
Start: 2019-05-21 | End: 2019-06-01 | Stop reason: HOSPADM

## 2019-05-21 RX ORDER — MIDAZOLAM HYDROCHLORIDE 1 MG/ML
1 INJECTION, SOLUTION INTRAMUSCULAR; INTRAVENOUS AS NEEDED
Status: DISCONTINUED | OUTPATIENT
Start: 2019-05-21 | End: 2019-05-21 | Stop reason: HOSPADM

## 2019-05-21 RX ORDER — SODIUM CHLORIDE 0.9 % (FLUSH) 0.9 %
5-40 SYRINGE (ML) INJECTION AS NEEDED
Status: DISCONTINUED | OUTPATIENT
Start: 2019-05-21 | End: 2019-05-21 | Stop reason: HOSPADM

## 2019-05-21 RX ORDER — SODIUM CHLORIDE 0.9 % (FLUSH) 0.9 %
5-40 SYRINGE (ML) INJECTION EVERY 8 HOURS
Status: DISCONTINUED | OUTPATIENT
Start: 2019-05-21 | End: 2019-05-21 | Stop reason: HOSPADM

## 2019-05-21 RX ORDER — HYDROMORPHONE HYDROCHLORIDE 4 MG/1
4 TABLET ORAL
Status: DISCONTINUED | OUTPATIENT
Start: 2019-05-21 | End: 2019-06-01 | Stop reason: HOSPADM

## 2019-05-21 RX ORDER — SODIUM CHLORIDE, SODIUM LACTATE, POTASSIUM CHLORIDE, CALCIUM CHLORIDE 600; 310; 30; 20 MG/100ML; MG/100ML; MG/100ML; MG/100ML
100 INJECTION, SOLUTION INTRAVENOUS CONTINUOUS
Status: DISCONTINUED | OUTPATIENT
Start: 2019-05-21 | End: 2019-05-21 | Stop reason: SDUPTHER

## 2019-05-21 RX ORDER — HYDROMORPHONE HYDROCHLORIDE 1 MG/ML
0.2 INJECTION, SOLUTION INTRAMUSCULAR; INTRAVENOUS; SUBCUTANEOUS
Status: DISCONTINUED | OUTPATIENT
Start: 2019-05-21 | End: 2019-05-21 | Stop reason: SDUPTHER

## 2019-05-21 RX ORDER — DEXMEDETOMIDINE HYDROCHLORIDE 4 UG/ML
INJECTION, SOLUTION INTRAVENOUS AS NEEDED
Status: DISCONTINUED | OUTPATIENT
Start: 2019-05-21 | End: 2019-05-21 | Stop reason: HOSPADM

## 2019-05-21 RX ORDER — HYDROMORPHONE HYDROCHLORIDE 1 MG/ML
0.2 INJECTION, SOLUTION INTRAMUSCULAR; INTRAVENOUS; SUBCUTANEOUS
Status: DISCONTINUED | OUTPATIENT
Start: 2019-05-21 | End: 2019-05-21 | Stop reason: HOSPADM

## 2019-05-21 RX ORDER — SODIUM CHLORIDE, SODIUM LACTATE, POTASSIUM CHLORIDE, CALCIUM CHLORIDE 600; 310; 30; 20 MG/100ML; MG/100ML; MG/100ML; MG/100ML
INJECTION, SOLUTION INTRAVENOUS
Status: DISCONTINUED | OUTPATIENT
Start: 2019-05-21 | End: 2019-05-21 | Stop reason: HOSPADM

## 2019-05-21 RX ORDER — HYDROMORPHONE HYDROCHLORIDE 2 MG/ML
INJECTION, SOLUTION INTRAMUSCULAR; INTRAVENOUS; SUBCUTANEOUS AS NEEDED
Status: DISCONTINUED | OUTPATIENT
Start: 2019-05-21 | End: 2019-05-21 | Stop reason: HOSPADM

## 2019-05-21 RX ORDER — MIDAZOLAM HYDROCHLORIDE 1 MG/ML
0.5 INJECTION, SOLUTION INTRAMUSCULAR; INTRAVENOUS
Status: DISCONTINUED | OUTPATIENT
Start: 2019-05-21 | End: 2019-05-21 | Stop reason: HOSPADM

## 2019-05-21 RX ORDER — FENTANYL CITRATE 50 UG/ML
25 INJECTION, SOLUTION INTRAMUSCULAR; INTRAVENOUS
Status: DISCONTINUED | OUTPATIENT
Start: 2019-05-21 | End: 2019-05-21 | Stop reason: SDUPTHER

## 2019-05-21 RX ORDER — FENTANYL CITRATE 50 UG/ML
50 INJECTION, SOLUTION INTRAMUSCULAR; INTRAVENOUS AS NEEDED
Status: DISCONTINUED | OUTPATIENT
Start: 2019-05-21 | End: 2019-05-21 | Stop reason: HOSPADM

## 2019-05-21 RX ORDER — PROPOFOL 10 MG/ML
INJECTION, EMULSION INTRAVENOUS AS NEEDED
Status: DISCONTINUED | OUTPATIENT
Start: 2019-05-21 | End: 2019-05-21 | Stop reason: HOSPADM

## 2019-05-21 RX ORDER — CEFAZOLIN SODIUM 1 G/3ML
INJECTION, POWDER, FOR SOLUTION INTRAMUSCULAR; INTRAVENOUS AS NEEDED
Status: DISCONTINUED | OUTPATIENT
Start: 2019-05-21 | End: 2019-05-21 | Stop reason: HOSPADM

## 2019-05-21 RX ORDER — FENTANYL CITRATE 50 UG/ML
INJECTION, SOLUTION INTRAMUSCULAR; INTRAVENOUS AS NEEDED
Status: DISCONTINUED | OUTPATIENT
Start: 2019-05-21 | End: 2019-05-21 | Stop reason: HOSPADM

## 2019-05-21 RX ORDER — GLYCOPYRROLATE 0.2 MG/ML
INJECTION INTRAMUSCULAR; INTRAVENOUS
Status: COMPLETED
Start: 2019-05-21 | End: 2019-05-21

## 2019-05-21 RX ORDER — SODIUM CHLORIDE, SODIUM LACTATE, POTASSIUM CHLORIDE, CALCIUM CHLORIDE 600; 310; 30; 20 MG/100ML; MG/100ML; MG/100ML; MG/100ML
100 INJECTION, SOLUTION INTRAVENOUS CONTINUOUS
Status: DISCONTINUED | OUTPATIENT
Start: 2019-05-21 | End: 2019-05-21 | Stop reason: HOSPADM

## 2019-05-21 RX ORDER — ACETAMINOPHEN 325 MG/1
650 TABLET ORAL EVERY 6 HOURS
Status: DISCONTINUED | OUTPATIENT
Start: 2019-05-21 | End: 2019-05-23

## 2019-05-21 RX ORDER — SODIUM CHLORIDE 0.9 % (FLUSH) 0.9 %
5-40 SYRINGE (ML) INJECTION AS NEEDED
Status: DISCONTINUED | OUTPATIENT
Start: 2019-05-21 | End: 2019-06-01 | Stop reason: HOSPADM

## 2019-05-21 RX ORDER — SODIUM CHLORIDE 9 MG/ML
125 INJECTION, SOLUTION INTRAVENOUS CONTINUOUS
Status: DISPENSED | OUTPATIENT
Start: 2019-05-21 | End: 2019-05-22

## 2019-05-21 RX ORDER — NEOSTIGMINE METHYLSULFATE 1 MG/ML
INJECTION INTRAVENOUS AS NEEDED
Status: DISCONTINUED | OUTPATIENT
Start: 2019-05-21 | End: 2019-05-21 | Stop reason: HOSPADM

## 2019-05-21 RX ORDER — KETOROLAC TROMETHAMINE 30 MG/ML
15 INJECTION, SOLUTION INTRAMUSCULAR; INTRAVENOUS
Status: DISCONTINUED | OUTPATIENT
Start: 2019-05-21 | End: 2019-05-22

## 2019-05-21 RX ORDER — HYDROMORPHONE HYDROCHLORIDE 1 MG/ML
0.5 INJECTION, SOLUTION INTRAMUSCULAR; INTRAVENOUS; SUBCUTANEOUS
Status: DISPENSED | OUTPATIENT
Start: 2019-05-21 | End: 2019-05-22

## 2019-05-21 RX ORDER — FENTANYL CITRATE 50 UG/ML
25 INJECTION, SOLUTION INTRAMUSCULAR; INTRAVENOUS
Status: DISCONTINUED | OUTPATIENT
Start: 2019-05-21 | End: 2019-05-21 | Stop reason: HOSPADM

## 2019-05-21 RX ORDER — ROCURONIUM BROMIDE 10 MG/ML
INJECTION, SOLUTION INTRAVENOUS AS NEEDED
Status: DISCONTINUED | OUTPATIENT
Start: 2019-05-21 | End: 2019-05-21 | Stop reason: HOSPADM

## 2019-05-21 RX ORDER — LIDOCAINE HYDROCHLORIDE 20 MG/ML
INJECTION, SOLUTION EPIDURAL; INFILTRATION; INTRACAUDAL; PERINEURAL AS NEEDED
Status: DISCONTINUED | OUTPATIENT
Start: 2019-05-21 | End: 2019-05-21 | Stop reason: HOSPADM

## 2019-05-21 RX ORDER — GLYCOPYRROLATE 0.2 MG/ML
0.2 INJECTION INTRAMUSCULAR; INTRAVENOUS ONCE
Status: COMPLETED | OUTPATIENT
Start: 2019-05-21 | End: 2019-05-21

## 2019-05-21 RX ADMIN — GLYCOPYRROLATE 0.2 MG: 0.2 INJECTION, SOLUTION INTRAMUSCULAR; INTRAVENOUS at 14:40

## 2019-05-21 RX ADMIN — SODIUM CHLORIDE 125 ML/HR: 900 INJECTION, SOLUTION INTRAVENOUS at 15:08

## 2019-05-21 RX ADMIN — ROCURONIUM BROMIDE 10 MG: 10 INJECTION, SOLUTION INTRAVENOUS at 12:57

## 2019-05-21 RX ADMIN — SODIUM CHLORIDE, SODIUM LACTATE, POTASSIUM CHLORIDE, CALCIUM CHLORIDE: 600; 310; 30; 20 INJECTION, SOLUTION INTRAVENOUS at 11:34

## 2019-05-21 RX ADMIN — PROPOFOL 50 MG: 10 INJECTION, EMULSION INTRAVENOUS at 11:52

## 2019-05-21 RX ADMIN — DEXMEDETOMIDINE HYDROCHLORIDE 4 MCG: 4 INJECTION, SOLUTION INTRAVENOUS at 12:50

## 2019-05-21 RX ADMIN — CEFAZOLIN SODIUM 2 G: 1 INJECTION, POWDER, FOR SOLUTION INTRAMUSCULAR; INTRAVENOUS at 12:15

## 2019-05-21 RX ADMIN — MAGNESIUM SULFATE HEPTAHYDRATE 2 G: 40 INJECTION, SOLUTION INTRAVENOUS at 12:15

## 2019-05-21 RX ADMIN — DEXAMETHASONE SODIUM PHOSPHATE 4 MG: 4 INJECTION, SOLUTION INTRA-ARTICULAR; INTRALESIONAL; INTRAMUSCULAR; INTRAVENOUS; SOFT TISSUE at 05:59

## 2019-05-21 RX ADMIN — FENTANYL CITRATE 50 MCG: 50 INJECTION, SOLUTION INTRAMUSCULAR; INTRAVENOUS at 11:46

## 2019-05-21 RX ADMIN — HYDROMORPHONE HYDROCHLORIDE 0.4 MG: 2 INJECTION, SOLUTION INTRAMUSCULAR; INTRAVENOUS; SUBCUTANEOUS at 13:37

## 2019-05-21 RX ADMIN — FENTANYL CITRATE 25 MCG: 50 INJECTION INTRAMUSCULAR; INTRAVENOUS at 14:30

## 2019-05-21 RX ADMIN — DEXMEDETOMIDINE HYDROCHLORIDE 4 MCG: 4 INJECTION, SOLUTION INTRAVENOUS at 12:47

## 2019-05-21 RX ADMIN — FENTANYL CITRATE 25 MCG: 50 INJECTION INTRAMUSCULAR; INTRAVENOUS at 15:18

## 2019-05-21 RX ADMIN — Medication 10 ML: at 21:04

## 2019-05-21 RX ADMIN — Medication 2 G: at 21:02

## 2019-05-21 RX ADMIN — DEXMEDETOMIDINE HYDROCHLORIDE 4 MCG: 4 INJECTION, SOLUTION INTRAVENOUS at 12:57

## 2019-05-21 RX ADMIN — KETOROLAC TROMETHAMINE 15 MG: 30 INJECTION, SOLUTION INTRAMUSCULAR at 18:45

## 2019-05-21 RX ADMIN — DEXAMETHASONE SODIUM PHOSPHATE 4 MG: 4 INJECTION, SOLUTION INTRA-ARTICULAR; INTRALESIONAL; INTRAMUSCULAR; INTRAVENOUS; SOFT TISSUE at 12:54

## 2019-05-21 RX ADMIN — PROPOFOL 100 MG: 10 INJECTION, EMULSION INTRAVENOUS at 11:46

## 2019-05-21 RX ADMIN — ROCURONIUM BROMIDE 30 MG: 10 INJECTION, SOLUTION INTRAVENOUS at 11:48

## 2019-05-21 RX ADMIN — GLYCOPYRROLATE 0.2 MG: 0.2 INJECTION INTRAMUSCULAR; INTRAVENOUS at 14:40

## 2019-05-21 RX ADMIN — ONDANSETRON 4 MG: 2 INJECTION INTRAMUSCULAR; INTRAVENOUS at 12:15

## 2019-05-21 RX ADMIN — NEOSTIGMINE METHYLSULFATE 3 MG: 1 INJECTION INTRAVENOUS at 13:41

## 2019-05-21 RX ADMIN — Medication 5 ML: at 15:00

## 2019-05-21 RX ADMIN — ACETAMINOPHEN 650 MG: 650 SUPPOSITORY RECTAL at 23:57

## 2019-05-21 RX ADMIN — ROCURONIUM BROMIDE 15 MG: 10 INJECTION, SOLUTION INTRAVENOUS at 11:52

## 2019-05-21 RX ADMIN — DEXAMETHASONE SODIUM PHOSPHATE 4 MG: 4 INJECTION, SOLUTION INTRA-ARTICULAR; INTRALESIONAL; INTRAMUSCULAR; INTRAVENOUS; SOFT TISSUE at 21:03

## 2019-05-21 RX ADMIN — LIDOCAINE HYDROCHLORIDE 80 MG: 20 INJECTION, SOLUTION EPIDURAL; INFILTRATION; INTRACAUDAL; PERINEURAL at 11:46

## 2019-05-21 RX ADMIN — ROCURONIUM BROMIDE 15 MG: 10 INJECTION, SOLUTION INTRAVENOUS at 12:45

## 2019-05-21 RX ADMIN — SODIUM CHLORIDE 500 MG: 900 INJECTION, SOLUTION INTRAVENOUS at 02:10

## 2019-05-21 RX ADMIN — Medication 10 ML: at 05:54

## 2019-05-21 RX ADMIN — PROPOFOL 50 MG: 10 INJECTION, EMULSION INTRAVENOUS at 11:49

## 2019-05-21 RX ADMIN — GLYCOPYRROLATE 0.6 MG: 0.2 INJECTION INTRAMUSCULAR; INTRAVENOUS at 13:41

## 2019-05-21 RX ADMIN — SODIUM CHLORIDE 500 MG: 900 INJECTION, SOLUTION INTRAVENOUS at 16:40

## 2019-05-21 RX ADMIN — ROCURONIUM BROMIDE 5 MG: 10 INJECTION, SOLUTION INTRAVENOUS at 11:46

## 2019-05-21 RX ADMIN — DEXMEDETOMIDINE HYDROCHLORIDE 4 MCG: 4 INJECTION, SOLUTION INTRAVENOUS at 12:53

## 2019-05-21 RX ADMIN — LIDOCAINE HYDROCHLORIDE 20 MG: 20 INJECTION, SOLUTION EPIDURAL; INFILTRATION; INTRACAUDAL; PERINEURAL at 13:42

## 2019-05-21 NOTE — PROGRESS NOTES
Pt is off the unit for surgery. PT will follow and see post surgery for re eval as appropriate.  Thank you, Brenda Whitehead, PT

## 2019-05-21 NOTE — PROGRESS NOTES
Problem: Falls - Risk of  Goal: *Absence of Falls  Description  Document Brissa Alvarez Fall Risk and appropriate interventions in the flowsheet. Outcome: Progressing Towards Goal     Pt remains free of falls during admission. Call bell and frequently used items within reach. Bedside table within reach. Patient provided non skid socks and instructed to call out for nurse when in need of assistance. Problem: Patient Education: Go to Patient Education Activity  Goal: Patient/Family Education  Outcome: Progressing Towards Goal     Pt educated on fall prevention. Pt demonstrates appropriate understanding. Pt is oriented and calls out appropriately for assistance ambulating. Purposeful hourly rounds initiated by staff. Bedside and Verbal shift change report given to Zion Hall (oncoming nurse) by Ghanshyam Topete (offgoing nurse). Report included the following information SBAR, Kardex and Quality Measures.

## 2019-05-21 NOTE — PERIOP NOTES
Patient from pre-op holding via bed. Patient has knee high teds in place and a indwelling anderson draining clear yellow.

## 2019-05-21 NOTE — ANESTHESIA PREPROCEDURE EVALUATION
Relevant Problems   No relevant active problems       Anesthetic History   No history of anesthetic complications            Review of Systems / Medical History  Patient summary reviewed, nursing notes reviewed and pertinent labs reviewed    Pulmonary  Within defined limits                 Neuro/Psych   Within defined limits           Cardiovascular    Hypertension                   GI/Hepatic/Renal  Within defined limits              Endo/Other  Within defined limits           Other Findings              Physical Exam    Airway  Mallampati: II  TM Distance: > 6 cm  Neck ROM: normal range of motion   Mouth opening: Normal     Cardiovascular  Regular rate and rhythm,  S1 and S2 normal,  no murmur, click, rub, or gallop             Dental  No notable dental hx       Pulmonary  Breath sounds clear to auscultation               Abdominal  GI exam deferred       Other Findings            Anesthetic Plan    ASA: 3  Anesthesia type: general          Induction: Intravenous  Anesthetic plan and risks discussed with: Patient

## 2019-05-21 NOTE — PROGRESS NOTES
Hospitalist Progress Note  Veronique Beckham MD  Answering service: [phone] from in house phone  Cell: [phone]      Date of Service:  2019  NAME:  America Christie  :  1950  MRN:  [id number]      Admission Summary:   74-year-old man with a past medical history significant for hypertension, gastroesophageal reflux disease, Meniere's disease, who was in his usual state of health until the day of his presentation at the emergency room when the patient developed a change in mental status. The spouse stated that she was out of the house and when she came back found the patient laying prone in the backyard unresponsive. It is not clear how long the patient had been lying prone in the backyard. When the EMS arrived at the scene, it was reported that the patient was verbally responsive to the EMS crew that he had too much alcohol. The patient was administering alcohol by rectal enema stating that he wanted to try something new. The patient has no history of alcohol abuse according to his spouse, but today the patient had too much alcohol than usual.  The patient was brought to the emergency room for further evaluation. When the patient arrived at the emergency room, the patient remained lethargic, but easily arousable and also with verbal response. CT scan of the head was obtained, this was negative. The patient's alcohol level was 162. The patient was referred to the Hospitalist Service for evaluation for admission.   No record of prior admission to this hospital        Interval history / Subjective:       F/u AMS  Weakness and tingling in arms  \"I am going for surgery anytime now\"  Pain controlled     Assessment & Plan:     Delirium and fall , resolved on    -sec to stroke vs seizure  -MRI shows hippocampus restricted diffusion , likley suggestive of seizure like activity ?  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 , will ask neuro to comment on it.   -Also initially though delirium due to alcohol intoxication also , alcohol level of 162    -EEG , no seizure , started on keppra by neuro though for now   -Echo done, awaiting results  -ASA to be started when cleared from surgery standpoint  -Continue on Keppra till seen by Dr Chago Barker  -The patient is back to baseline mental status    B/L upper extremity weakness and tingling in arms with neck and shoulder pain POA but  More evident after he woke up later in hospitalization   -MRI cervical spine with central cord compression ,  -C3/4 due to central disc herniation   -Appreciate Neurosurgery, decompression 5/21     Seizure like activity   -Started on keppra by neuro   -MRI shows hippocampus restricted diffusion , likley suggestive of seizure like activity     leukocytosis and lactic acidosis on admission    likely reactive , may be had seizure , it is not SIRS , no infection     HTN   On prn hydralazine   Restart HCTZ , his home med as BP on higher side , likley from steroids   I wanted to start another agent but spouse wanted me to wait and see given risk of hypotension post op , makes sense   Use prn meds     Alcohol intoxication and now risk of withdrawal   CIWA protocol   Resolved     On full liquids , SLP to follow   Kirsty , remove post op if possible     PT/OT   Code status:  Full   DVT prophylaxis: heparin s/q when cleared by Surgery    Plan: Follow Neurosurgery    Care Plan discussed with: Patient/Family  Disposition: TBD     Hospital Problems  Date Reviewed: 5/20/2019          Codes Class Noted POA    * (Principal) Alcohol intoxication delirium ([facility] [address] ICD-10-CM: B31.110  ICD-9-CM: 291.0  5/15/2019 Yes        Alcohol intoxication ([facility] [address] ICD-10-CM: Z88.419  ICD-9-CM: 305.00  5/15/2019 Unknown                Review of Systems:   A comprehensive review of systems was negative except for that written in the HPI.        Vital Signs:    Last 24hrs VS reviewed since prior progress note. Most recent are:  Visit Vitals  /79 (BP 1 Location: Right arm, BP Patient Position: At rest)   Pulse 83   Temp 98.1 °F (36.7 °C)   Resp 17   Ht 5' 8\" (1.727 m)   Wt 72.5 kg (159 lb 12.8 oz)   SpO2 100%   BMI 24.30 kg/m²         Intake/Output Summary (Last 24 hours) at 5/21/2019 4816  Last data filed at 5/21/2019 0225  Gross per 24 hour   Intake 100 ml   Output 3075 ml   Net -2975 ml        Physical Examination:             Constitutional:   Awake and feels better , face all flushed ,    ENT:  Oral mucous moist, oropharynx benign. Neck supple,    Resp:  CTA bilaterally. No wheezing/rhonchi/rales. No accessory muscle use   CV:  Regular rhythm, normal rate, no murmurs, gallops, rubs    GI:  Soft, non distended, non tender. normoactive bowel sounds, no hepatosplenomegaly     Musculoskeletal:  No edema, warm, 2+ pulses throughout    Neurologic:  upper extremity 3-4/5 b/l             Data Review:    Review and/or order of clinical lab test      Labs:     No results for input(s): WBC, HGB, HCT, PLT, HGBEXT, HCTEXT, PLTEXT, HGBEXT, HCTEXT, PLTEXT in the last 72 hours. No results for input(s): NA, K, CL, CO2, BUN, CREA, GLU, CA, MG, PHOS, URICA in the last 72 hours. No results for input(s): SGOT, GPT, ALT, AP, TBIL, TBILI, TP, ALB, GLOB, GGT, AML, LPSE in the last 72 hours. No lab exists for component: AMYP, HLPSE  Recent Labs     05/19/19  0945   INR 1.2*   PTP 11.6*   APTT 27.2      No results for input(s): FE, TIBC, PSAT, FERR in the last 72 hours. No results found for: FOL, RBCF   No results for input(s): PH, PCO2, PO2 in the last 72 hours. No results for input(s): CPK, CKNDX, TROIQ in the last 72 hours.     No lab exists for component: CPKMB  Lab Results   Component Value Date/Time    Cholesterol, total 129 05/15/2019 01:54 AM    HDL Cholesterol 54 05/15/2019 01:54 AM    LDL, calculated 65 05/15/2019 01:54 AM    Triglyceride 50 05/15/2019 01:54 AM    CHOL/HDL Ratio 2.4 05/15/2019 01:54 AM     Lab Results   Component Value Date/Time    Glucose (POC) 148 (H) 05/18/2019 04:47 PM    Glucose (POC) 170 (H) 05/18/2019 12:37 PM    Glucose (POC) 114 (H) 05/17/2019 04:42 PM    Glucose (POC) 82 05/17/2019 12:11 PM    Glucose (POC) 88 05/17/2019 06:25 AM     Lab Results   Component Value Date/Time    Color YELLOW/STRAW 05/14/2019 08:14 PM    Appearance CLEAR 05/14/2019 08:14 PM    Specific gravity 1.016 05/14/2019 08:14 PM    pH (UA) 5.5 05/14/2019 08:14 PM    Protein NEGATIVE  05/14/2019 08:14 PM    Glucose NEGATIVE  05/14/2019 08:14 PM    Ketone 15 (A) 05/14/2019 08:14 PM    Bilirubin NEGATIVE  05/14/2019 08:14 PM    Urobilinogen 0.2 05/14/2019 08:14 PM    Nitrites NEGATIVE  05/14/2019 08:14 PM    Leukocyte Esterase NEGATIVE  05/14/2019 08:14 PM         Medications Reviewed:     Current Facility-Administered Medications   Medication Dose Route Frequency    folic acid (FOLVITE) tablet 1 mg  1 mg Oral DAILY    thiamine HCL (B-1) tablet 100 mg  100 mg Oral DAILY    pantothenic ac-min oil-pet,hyd (AQUAPHOR) 41 % ointment   Topical PRN    dexamethasone (DECADRON) 4 mg/mL injection 4 mg  4 mg IntraVENous Q8H    pantoprazole (PROTONIX) tablet 40 mg  40 mg Oral DAILY    hydroCHLOROthiazide (HYDRODIURIL) tablet 25 mg  25 mg Oral DAILY    hydrALAZINE (APRESOLINE) 20 mg/mL injection 10 mg  10 mg IntraVENous Q4H PRN    metoprolol (LOPRESSOR) injection 2.5 mg  2.5 mg IntraVENous Q6H PRN    levETIRAcetam (KEPPRA) 500 mg in 0.9% sodium chloride 100 mL IVPB  500 mg IntraVENous Q12H    morphine injection 2 mg  2 mg IntraVENous Q6H PRN    sodium chloride (NS) flush 5-40 mL  5-40 mL IntraVENous Q8H    sodium chloride (NS) flush 5-40 mL  5-40 mL IntraVENous PRN    ondansetron (ZOFRAN) injection 4 mg  4 mg IntraVENous Q4H PRN    naloxone (NARCAN) injection 0.4 mg  0.4 mg IntraVENous PRN    bisacodyl (DULCOLAX) tablet 5 mg  5 mg Oral DAILY PRN    acetaminophen (TYLENOL) tablet 650 mg  650 mg Oral Q4H PRN    lidocaine (LIDODERM) 5 % patch 1 Patch  1 Patch TransDERmal Q24H    prochlorperazine (COMPAZINE) tablet 10 mg  10 mg Oral Q6H PRN    HYDROcodone-acetaminophen (NORCO) 5-325 mg per tablet 1 Tab  1 Tab Oral Q6H PRN    phenol throat spray (CHLORASEPTIC) 1 Spray  1 Spray Oral PRN     ______________________________________________________________________  EXPECTED LENGTH OF STAY: 3d 9h  ACTUAL LENGTH OF STAY:          6                 Luan Campa MD

## 2019-05-21 NOTE — PROGRESS NOTES
0729: TRANSFER - OUT REPORT:    Verbal report given to Amaury Taylor RN (name) on Leanne Minor  being transferred to OR holding (unit) for ordered procedure       Report consisted of patients Situation, Background, Assessment and   Recommendations(SBAR). Information from the following report(s) Intake/Output and Accordion was reviewed with the receiving nurse. Lines:   Peripheral IV 05/20/19 Left Hand (Active)   Site Assessment Clean, dry, & intact 5/21/2019  4:22 AM   Phlebitis Assessment 0 5/21/2019  4:22 AM   Infiltration Assessment 0 5/21/2019  4:22 AM   Dressing Status Clean, dry, & intact 5/21/2019  4:22 AM   Dressing Type Transparent;Tape 5/21/2019  4:22 AM   Hub Color/Line Status Pink;Capped;Flushed 5/21/2019  4:22 AM   Action Taken Open ports on tubing capped 5/21/2019  4:22 AM   Alcohol Cap Used Yes 5/21/2019  4:22 AM        Opportunity for questions and clarification was provided.

## 2019-05-21 NOTE — PERIOP NOTES
TRANSFER - OUT REPORT:    Verbal report given to XENIA PATTERSON(name) on Katy Macdonald  being transferred to [facility](unit) for routine post - op       Report consisted of patients Situation, Background, Assessment and   Recommendations(SBAR). Time Pre op antibiotic given:Y  Anesthesia Stop time: Y  Lofton Present on Transfer to floor:Y  Order for Lofton on Chart:Y  Discharge Prescriptions with Chart:N    Information from the following report(s) SBAR was reviewed with the receiving nurse. Opportunity for questions and clarification was provided. Is the patient on 02? YES       L/Min 2       Other LITERS    Is the patient on a monitor? NO    Is the nurse transporting with the patient? NO    Surgical Waiting Area notified of patient's transfer from PACU? YES      The following personal items collected during your admission accompanied patient upon transfer:   Dental Appliance: Dental Appliances: None  Vision: Visual Aid: None  Hearing Aid: Hearing Aid: Bilateral  Jewelry: Jewelry: None  Clothing: Clothing: At bedside  Other Valuables:  Other Valuables: None  Valuables sent to safe:

## 2019-05-21 NOTE — ANESTHESIA POSTPROCEDURE EVALUATION
Procedure(s):  C3-4 ANTERIOR CERVICAL DISCECTOMY FUSION. general    <BSHSIANPOST>    Vitals Value Taken Time   /62 5/21/2019  2:15 PM   Temp 36.3 °C (97.3 °F) 5/21/2019  2:05 PM   Pulse 48 5/21/2019  2:16 PM   Resp 12 5/21/2019  2:16 PM   SpO2 98 % 5/21/2019  2:16 PM   Vitals shown include unvalidated device data.

## 2019-05-21 NOTE — BRIEF OP NOTE
BRIEF OPERATIVE NOTE    Date of Procedure: 5/21/2019   Preoperative Diagnosis: HERNIATED DISC C3-4, CORD CONTUSION  Postoperative Diagnosis: same  Procedure(s):  C3-4 ANTERIOR CERVICAL DISCECTOMY FUSION  Surgeon(s) and Role:     * Kiley Da Silva MD - Primary         Surgical Assistant: Tessa Sepulveda  Surgical Staff:  Circ-1: Vince Lacey RN  Circ-Relief: Kendrick Alejo  Radiology Technician: RT Carline, R; Serena Mak, [place],   Scrub RN-1: Kendrick Alejo  Scrub RN-Relief: Vikas Jaramillo RN  Surg Asst-1: Dee Dee YEAGER  Event Time In Time Out   Incision Start 05/21/2019 1217    Incision Close       Anesthesia: General   Estimated Blood Loss: 20cc  Specimens: * No specimens in log *   Findings:  Instability at C1/4, HNP O2/0  Complications: none  Implants:   Implant Name Type Inv.  Item Serial No.  Lot No. LRB No. Used Action   GRAFT BNE [employer]  --  - [id number]  GRAFT BNE ELITE SHAVON  --  [id number] MUSCULOSKELETAL TRANS N/A N/A 1 Implanted   CAGE CERV [id number] STRL -- ANATOMIC PTC - SN/A  CAGE CERV [id number] STRL -- ANATOMIC PTC N/A [employer] SOFAM[employer] [id number] N/A 1 Implanted

## 2019-05-21 NOTE — ROUTINE PROCESS
Patient: Josh Vargas MRN: [id number]  SSN: [id number]   YOB: 1950  Age: 78 y.o. Sex: male     Patient is status post Procedure(s):  C3-4 ANTERIOR CERVICAL DISCECTOMY FUSION. Surgeon(s) and Role:     * Rosey Schafer MD - Primary    Local/Dose/Irrigation:  SEE MAR                Peripheral IV 05/20/19 Left Hand (Active)   Site Assessment Clean, dry, & intact 5/21/2019  8:00 AM   Phlebitis Assessment 0 5/21/2019  8:00 AM   Infiltration Assessment 0 5/21/2019  8:00 AM   Dressing Status Clean, dry, & intact 5/21/2019  8:00 AM   Dressing Type Tape;Transparent 5/21/2019  8:00 AM   Hub Color/Line Status Pink;Capped 5/21/2019  8:00 AM   Action Taken Open ports on tubing capped 5/21/2019  8:00 AM   Alcohol Cap Used Yes 5/21/2019  8:00 AM       Peripheral IV 05/21/19 Right Wrist (Active)            Airway - Endotracheal Tube 05/21/19 Oral (Active)                   Dressing/Packing:  Wound Neck-Dressing Type:  Adhesive wound closure strips (Steri-Strips)(NON ADHESIVE TELFA) (05/21/19 1346)    Splint/Cast: Wound Neck-Splint Type/Material: Cervical Collar]    Other:

## 2019-05-21 NOTE — PROGRESS NOTES
Speech pathology note  Reviewed chart and note patient NPO for C3-4 ACDF at 1000 today. Will follow for re-evaluation of swallow function when cleared by MD for PO intake after surgery. Thank you.     Ryan Mars., CCC-SLP

## 2019-05-21 NOTE — PROGRESS NOTES
Occupational Therapy Note:    Chart reviewed. Pt currently off floor for surgery. Will follow up when stable for participation in therapy after procedure. Thank you.     Mitzy Casper, OTR/L

## 2019-05-22 PROBLEM — S14.101A SPINAL CORD INJURY AT C1-C4 LEVEL (HCC): Status: ACTIVE | Noted: 2019-05-22

## 2019-05-22 LAB
ALBUMIN SERPL-MCNC: 2.5 G/DL (ref 3.5–5)
ALBUMIN/GLOB SERPL: 1 {RATIO} (ref 1.1–2.2)
ALP SERPL-CCNC: 57 U/L (ref 45–117)
ALT SERPL-CCNC: 46 U/L (ref 12–78)
ANION GAP SERPL CALC-SCNC: 7 MMOL/L (ref 5–15)
AST SERPL-CCNC: 18 U/L (ref 15–37)
BASOPHILS # BLD: 0 K/UL (ref 0–0.1)
BASOPHILS NFR BLD: 0 % (ref 0–1)
BILIRUB SERPL-MCNC: 0.7 MG/DL (ref 0.2–1)
BUN SERPL-MCNC: 22 MG/DL (ref 6–20)
BUN/CREAT SERPL: 31 (ref 12–20)
CALCIUM SERPL-MCNC: 7.8 MG/DL (ref 8.5–10.1)
CHLORIDE SERPL-SCNC: 106 MMOL/L (ref 97–108)
CO2 SERPL-SCNC: 25 MMOL/L (ref 21–32)
CREAT SERPL-MCNC: 0.72 MG/DL (ref 0.7–1.3)
DIFFERENTIAL METHOD BLD: ABNORMAL
EOSINOPHIL # BLD: 0 K/UL (ref 0–0.4)
EOSINOPHIL NFR BLD: 0 % (ref 0–7)
ERYTHROCYTE [DISTWIDTH] IN BLOOD BY AUTOMATED COUNT: 14 % (ref 11.5–14.5)
GLOBULIN SER CALC-MCNC: 2.6 G/DL (ref 2–4)
GLUCOSE SERPL-MCNC: 112 MG/DL (ref 65–100)
HCT VFR BLD AUTO: 41.9 % (ref 36.6–50.3)
HGB BLD-MCNC: 13.8 G/DL (ref 12.1–17)
IMM GRANULOCYTES # BLD AUTO: 0.1 K/UL (ref 0–0.04)
IMM GRANULOCYTES NFR BLD AUTO: 1 % (ref 0–0.5)
LYMPHOCYTES # BLD: 1 K/UL (ref 0.8–3.5)
LYMPHOCYTES NFR BLD: 9 % (ref 12–49)
MAGNESIUM SERPL-MCNC: 2.5 MG/DL (ref 1.6–2.4)
MCH RBC QN AUTO: 29.5 PG (ref 26–34)
MCHC RBC AUTO-ENTMCNC: 32.9 G/DL (ref 30–36.5)
MCV RBC AUTO: 89.5 FL (ref 80–99)
MONOCYTES # BLD: 0.9 K/UL (ref 0–1)
MONOCYTES NFR BLD: 9 % (ref 5–13)
NEUTS SEG # BLD: 8.6 K/UL (ref 1.8–8)
NEUTS SEG NFR BLD: 81 % (ref 32–75)
NRBC # BLD: 0 K/UL (ref 0–0.01)
NRBC BLD-RTO: 0 PER 100 WBC
PHOSPHATE SERPL-MCNC: 4.4 MG/DL (ref 2.6–4.7)
PLATELET # BLD AUTO: 173 K/UL (ref 150–400)
PMV BLD AUTO: 9.7 FL (ref 8.9–12.9)
POTASSIUM SERPL-SCNC: 4.2 MMOL/L (ref 3.5–5.1)
PROT SERPL-MCNC: 5.1 G/DL (ref 6.4–8.2)
RBC # BLD AUTO: 4.68 M/UL (ref 4.1–5.7)
SODIUM SERPL-SCNC: 138 MMOL/L (ref 136–145)
WBC # BLD AUTO: 10.5 K/UL (ref 4.1–11.1)

## 2019-05-22 PROCEDURE — 74011250636 HC RX REV CODE- 250/636: Performed by: NEUROLOGICAL SURGERY

## 2019-05-22 PROCEDURE — 36415 COLL VENOUS BLD VENIPUNCTURE: CPT

## 2019-05-22 PROCEDURE — 0RG10A0 FUSION OF CERVICAL VERTEBRAL JOINT WITH INTERBODY FUSION DEVICE, ANTERIOR APPROACH, ANTERIOR COLUMN, OPEN APPROACH: ICD-10-PCS | Performed by: NEUROLOGICAL SURGERY

## 2019-05-22 PROCEDURE — 00NW0ZZ RELEASE CERVICAL SPINAL CORD, OPEN APPROACH: ICD-10-PCS | Performed by: NEUROLOGICAL SURGERY

## 2019-05-22 PROCEDURE — 83735 ASSAY OF MAGNESIUM: CPT

## 2019-05-22 PROCEDURE — 74011250636 HC RX REV CODE- 250/636: Performed by: PSYCHIATRY & NEUROLOGY

## 2019-05-22 PROCEDURE — 97164 PT RE-EVAL EST PLAN CARE: CPT

## 2019-05-22 PROCEDURE — 74011000258 HC RX REV CODE- 258: Performed by: PSYCHIATRY & NEUROLOGY

## 2019-05-22 PROCEDURE — 97530 THERAPEUTIC ACTIVITIES: CPT

## 2019-05-22 PROCEDURE — 97535 SELF CARE MNGMENT TRAINING: CPT

## 2019-05-22 PROCEDURE — 74011250636 HC RX REV CODE- 250/636: Performed by: HOSPITALIST

## 2019-05-22 PROCEDURE — 85025 COMPLETE CBC W/AUTO DIFF WBC: CPT

## 2019-05-22 PROCEDURE — 80053 COMPREHEN METABOLIC PANEL: CPT

## 2019-05-22 PROCEDURE — 74011250636 HC RX REV CODE- 250/636: Performed by: NURSE PRACTITIONER

## 2019-05-22 PROCEDURE — 74011000258 HC RX REV CODE- 258: Performed by: HOSPITALIST

## 2019-05-22 PROCEDURE — 97116 GAIT TRAINING THERAPY: CPT

## 2019-05-22 PROCEDURE — 0RT30ZZ RESECTION OF CERVICAL VERTEBRAL DISC, OPEN APPROACH: ICD-10-PCS | Performed by: NEUROLOGICAL SURGERY

## 2019-05-22 PROCEDURE — 94760 N-INVAS EAR/PLS OXIMETRY 1: CPT

## 2019-05-22 PROCEDURE — 84100 ASSAY OF PHOSPHORUS: CPT

## 2019-05-22 PROCEDURE — 92526 ORAL FUNCTION THERAPY: CPT

## 2019-05-22 PROCEDURE — 65660000000 HC RM CCU STEPDOWN

## 2019-05-22 PROCEDURE — 97165 OT EVAL LOW COMPLEX 30 MIN: CPT

## 2019-05-22 PROCEDURE — 74011000250 HC RX REV CODE- 250: Performed by: INTERNAL MEDICINE

## 2019-05-22 RX ORDER — MECLIZINE HCL 12.5 MG 12.5 MG/1
12.5 TABLET ORAL
Status: DISCONTINUED | OUTPATIENT
Start: 2019-05-22 | End: 2019-06-01 | Stop reason: HOSPADM

## 2019-05-22 RX ORDER — LEVETIRACETAM 500 MG/1
500 TABLET ORAL 2 TIMES DAILY
Status: DISCONTINUED | OUTPATIENT
Start: 2019-05-22 | End: 2019-05-22

## 2019-05-22 RX ORDER — DEXAMETHASONE SODIUM PHOSPHATE 4 MG/ML
4 INJECTION, SOLUTION INTRA-ARTICULAR; INTRALESIONAL; INTRAMUSCULAR; INTRAVENOUS; SOFT TISSUE EVERY 12 HOURS
Status: DISCONTINUED | OUTPATIENT
Start: 2019-05-22 | End: 2019-05-25

## 2019-05-22 RX ORDER — HYDROMORPHONE HYDROCHLORIDE 1 MG/ML
0.5 INJECTION, SOLUTION INTRAMUSCULAR; INTRAVENOUS; SUBCUTANEOUS
Status: DISCONTINUED | OUTPATIENT
Start: 2019-05-22 | End: 2019-06-01 | Stop reason: HOSPADM

## 2019-05-22 RX ADMIN — HYDROMORPHONE HYDROCHLORIDE 0.5 MG: 1 INJECTION, SOLUTION INTRAMUSCULAR; INTRAVENOUS; SUBCUTANEOUS at 18:05

## 2019-05-22 RX ADMIN — KETOROLAC TROMETHAMINE 15 MG: 30 INJECTION, SOLUTION INTRAMUSCULAR at 03:56

## 2019-05-22 RX ADMIN — CHLORASEPTIC 1 SPRAY: 1.5 LIQUID ORAL at 03:51

## 2019-05-22 RX ADMIN — Medication 10 ML: at 06:13

## 2019-05-22 RX ADMIN — HYDROMORPHONE HYDROCHLORIDE 0.5 MG: 1 INJECTION, SOLUTION INTRAMUSCULAR; INTRAVENOUS; SUBCUTANEOUS at 06:12

## 2019-05-22 RX ADMIN — Medication 2 G: at 04:00

## 2019-05-22 RX ADMIN — SODIUM CHLORIDE 500 MG: 900 INJECTION, SOLUTION INTRAVENOUS at 04:00

## 2019-05-22 RX ADMIN — DEXAMETHASONE SODIUM PHOSPHATE 4 MG: 4 INJECTION, SOLUTION INTRA-ARTICULAR; INTRALESIONAL; INTRAMUSCULAR; INTRAVENOUS; SOFT TISSUE at 06:12

## 2019-05-22 RX ADMIN — Medication 10 ML: at 21:12

## 2019-05-22 RX ADMIN — HYDROMORPHONE HYDROCHLORIDE 0.5 MG: 1 INJECTION, SOLUTION INTRAMUSCULAR; INTRAVENOUS; SUBCUTANEOUS at 13:01

## 2019-05-22 RX ADMIN — SODIUM CHLORIDE 500 MG: 900 INJECTION, SOLUTION INTRAVENOUS at 16:30

## 2019-05-22 RX ADMIN — HYDROMORPHONE HYDROCHLORIDE 0.5 MG: 1 INJECTION, SOLUTION INTRAMUSCULAR; INTRAVENOUS; SUBCUTANEOUS at 21:12

## 2019-05-22 RX ADMIN — DEXAMETHASONE SODIUM PHOSPHATE 4 MG: 4 INJECTION, SOLUTION INTRAMUSCULAR; INTRAVENOUS at 21:12

## 2019-05-22 NOTE — OP NOTES
[address]   OPERATIVE REPORT    Name:  Bacilio Stahl  MR#:  [id number]  :  1950  ACCOUNT #:  [de-identified]  DATE OF SERVICE:  2019    PREOPERATIVE DIAGNOSIS:  Herniated disk at C3-4 with cord contusion. POSTOPERATIVE DIAGNOSIS:  Herniated disk at C3-4 with cord contusion. PROCEDURE PERFORMED:  Anterior cervical diskectomy, decompression, fusion to C3-4. SURGEON:  Heydi Ricardo MD    ASSISTANT:  Carlene Cartagena. ANESTHESIA:  General.    COMPLICATIONS:  None. SPECIMENS REMOVED:  None. IMPLANTS:  Please see brief op note for detailed record    ESTIMATED BLOOD LOSS:  20 mL. FINDINGS:  Instability at C3-4 with herniated nucleus pulposus. INDICATIONS FOR SURGERY:  This is a 16-year-old gentleman who had a fall and was unable to get up and he laid on the ground for approximately 5 hours. He was intoxicated. It was noted on his admission that he was unable to move his arms. Further workup included an MRI that showed a large disk herniation with a cord contusion. He did have several days of steroids and he had some improvement in his neurologic function. Risks and benefits of surgery were discussed with the patient's wife. They understood these and agreed to proceed. OPERATIVE COURSE:  The patient was brought to the operating room and he was placed under general endotracheal anesthesia. He was given 2 g of Ancef within 1 hour of incision. He had SCDs on and functioning during the entire case. I used preoperative x-ray to adrián 3-4 level on the skin, and a transverse incision was marked going from the midline to the medial border of the sternocleidomastoid muscle. He was sterilely prepped and draped in standard fashion. A #10 blade was used to incise the skin. Hemostasis was obtained with Bovie and bipolar cautery. Dissection was carried down along the medial border of the sternocleidomastoid muscle to the anterior cervical spine.   He had an obvious hematoma over the C3-4 disk space underneath the anterior longitudinal ligament. I marked this level with a disk space marker and verified it was the correct level with intraoperative x-rays. Then, using handheld retractors, I bipolared the attachments of the longus colli and then placed self-retaining retractors with teeth underneath the longus colli for better visualization as well as protection of vital structures. I used #11 blade to incise the disk space. Disk material was removed. There was marked instability. I used curette and pituitaries to continue to remove disk material.  I placed 14-mm distraction pins. I placed gentle distraction across the disk space. I brought in the operating microscope. I removed the posterior longitudinal ligament and had good decompression at the cord. I removed posterior osteophytes from the inferior edge of C5 and the superior edge of C6 with good decompression. I did not decompress widely laterally as there was no neural compression on either neural foramen. The wound was irrigated. I removed all cartilaginous endplate to provide good fusion surfaces. A 6-mm trial had a good fit. Therefore, a 6-mm PEEK interbody device with titanium on the endplate and Dyan (non structural allograft) in the center was placed. Distraction was taken off the disk space. Distraction pins were removed. A 25-mm plate was placed using fixed angle screws 15 mm into the bodies of C5 and C6. There was good purchase into both vertebral bodies. Locking mechanisms were tightened. He tolerated the surgery well. The wound was copiously irrigated with antibiotic irrigation. It was closed using 2-0 interrupted Vicryl for the platysma and the subcutaneous tissue, 4-0 running subcuticular stitch for the skin. He was taken to the Postoperative Care Unit in stable condition.       MD ARGENIS [email or web address]/[email or web address]  D:  05/21/2019 14:01  T:  05/22/2019 1:33  JOB #: [id number]

## 2019-05-22 NOTE — PROGRESS NOTES
Problem: Self Care Deficits Care Plan (Adult)  Goal: *Acute Goals and Plan of Care (Insert Text)  Description  Occupational Therapy Goals  Initiated 5/22/2019  1. Patient will perform upper body dressing with supervision/set-up within 7 day(s) using compensatory strategies PRN. 2.  Patient will perform lower body dressing with minimal assistance/contact guard assist within 7 day(s) using compensatory strategies PRN. 3.  Patient will perform grooming with minimal assistance/contact guard assist within 7 day(s) using compensatory strategies PRN. 4.  Patient will perform toilet transfers with minimal assistance/contact guard assist within 7 day(s). 5.  Patient will perform all aspects of toileting with minimal assistance/contact guard assist within 7 day(s). 6.  Patient will demonstrate ability to perform self-ROM for R and L digit/wrist extension with minimal assistance within 7 day(s). Outcome: Progressing Towards Goal   OCCUPATIONAL THERAPY EVALUATION  Patient: Issac Borden (23 y.o. male)  Date: 5/22/2019  Primary Diagnosis: [facility] ([facility] [F10.929]  Procedure(s) (LRB):  C3-4 ANTERIOR CERVICAL DISCECTOMY FUSION (N/A) 1 Day Post-Op   Precautions:c-collar  Fall, Spinal    ASSESSMENT :  Based on the objective data described below, the patient presents with largely MAX A for LB ADLS, MOD A for UB ADLs, and MIN A x 2 for ADL transfers and tasks s/p cervical sx POD 1 s/p central cord compression and C3/4 central disc herniation. Patient currently presents with the following performance deficits including but not limited to: generalized weakness, impaired sitting/standing balance, impaired GM and FM coordination, decreased BUE ROM, decreased bilateral hand dexterity, hyperflexion in digits/wrist (primarily), impaired proprioception, and decreased safety awareness.      Patient is a questionable historian at this time, however patient's daughter present during OT evaluation and providing information on PLOF. 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 Prior to his accident and subsequent admission to hospital, patient was not using AD for functional mobility. Patient is significantly below functional baseline at this time however is motivated to regain functional independence. Highly recommend inpatient rehab to maximize patient safety and independence with ADL transfers and tasks. If IP rehab is not an option, patient will need SNF rehab. Patient will benefit from skilled intervention to address the above impairments. Patient?s rehabilitation potential is considered to be Good  Factors which may influence rehabilitation potential include:   ? None noted  ? Mental ability/status  ? Medical condition  ? Home/family situation and support systems  ? Safety awareness  ? Pain tolerance/management  ? Other:      PLAN :  Recommendations and Planned Interventions:  ?               Self Care Training                  ? Therapeutic Activities  ? Functional Mobility Training    ? Cognitive Retraining  ? Therapeutic Exercises           ? Endurance Activities  ? Balance Training                   ? Neuromuscular Re-Education  ? Visual/Perceptual Training     ? Home Safety Training  ? Patient Education                 ? Family Training/Education  ? Other (comment):    Frequency/Duration: Patient will be followed by occupational therapy 5 times a week to address goals. Discharge Recommendations: Inpatient Rehab  Further Equipment Recommendations for Discharge: TBD at rehab      SUBJECTIVE:   Patient stated ? I really want to go to [facility]. ?    OBJECTIVE DATA SUMMARY:   HISTORY:   Past Medical History:   Diagnosis Date    Cancer ([address]     BCCA skin    Family history of skin cancer     Hypertension     Ill-defined condition     meineer's disease     Past Surgical History:   Procedure Laterality Date    COLONOSCOPY N/A 7/26/2017    COLONOSCOPY performed by Isela Alfonso MD at [facility] ENDOSCOPY    HX HEENT      wisdom teeth extracted    HX MOHS PROCEDURES  06/20/2017    [facility] R cheek by Dr. Tere Knight         Prior Level of Function/Environment/Context:  Patient is a questionable historian at this time, however patient's daughter present during OT evaluation and providing information on PLOF. 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 Prior to his accident and subsequent admission to hospital, patient was not using AD for functional mobility. Expanded or extensive additional review of patient history:     Home Situation  Home Environment: Private residence  # Steps to Enter: 4  Rails to Enter: No  One/Two Story Residence: Two story, live on 1st floor  Living Alone: No  Support Systems: Spouse/Significant Other/Partner  Patient Expects to be Discharged to[de-identified] Private residence  Current DME Used/Available at Home: None  Tub or Shower Type: Shower      EXAMINATION OF PERFORMANCE DEFICITS:  Cognitive/Behavioral Status:  Neurologic State: Alert; Appropriate for age  Orientation Level: Oriented to person  Cognition: Follows commands  Perception: Appears intact  Perseveration: No perseveration noted  Safety/Judgement: Decreased insight into deficits; Decreased awareness of need for safety    Skin: exposed areas grossly intact    Edema: none noted    Hearing:   Auditory  Auditory Impairment: Hearing aid(s), Hard of hearing, right side  Hearing Aids/Status: With patient                Range of Motion:  Patient presenting w/ ~ 50 degrees active shoulder flexion, 90 degrees active shoulder abduction, no active wrist extension and limited active digit extension. AROM: Generally decreased, functional  PROM: Generally decreased, functional                      Strength:  3/5 shoulder, 3+/5 elbow, 1 L wrist, 0 R wrist, (will continue to assess)  Strength: Generally decreased, functional                Coordination:  Coordination: Generally decreased, functional  Fine Motor Skills-Upper: Left Impaired;Right Impaired    Gross Motor Skills-Upper: Left Impaired;Right Impaired    Tone & Sensation:  Patient with increased tone in BUE (hyperflexion noted); patient with decreased proprioception of BUE; verbalizing ability to feel temperature sensation on bilateral hands however with BUE numbness  Tone: Abnormal  Sensation: Impaired                      Balance:  Sitting: Impaired; Without support  Sitting - Static: Fair (occasional)  Sitting - Dynamic: Poor (constant support)  Standing: With support; Impaired  Standing - Static: Poor;Constant support  Standing - Dynamic : Poor;Constant support    Functional Mobility and Transfers for ADLs:  Bed Mobility:  Rolling: Minimum assistance  Supine to Sit: Moderate assistance;Assist x2; Additional time    Transfers:  Sit to Stand: Minimum assistance;Assist x1;Additional time; Adaptive equipment  Stand to Sit: Minimum assistance;Assist x2; Additional time; Adaptive equipment  Bed to Chair: Minimum assistance;Assist x2; Additional time    ADL Assessment:  Feeding: Other (comment)(patient NPO at this time)    Oral Facial Hygiene/Grooming: Moderate assistance(infer 2* decreased BUE ROM and coordination)    Bathing: Maximum assistance(infer 2* balance, coordination, and dexterity)    Upper Body Dressing:  Moderate assistance(infer 2* coordination and dexterity)    Lower Body Dressing: Maximum assistance(infer 2* balance, coordination, and dexterity)    Toileting: Maximum assistance(infer 2* balance, coordination, and dexterity) ADL Intervention and task modifications:     Patient seen with PT present to maximize patient safety with ADL transfers and tasks. Patient completing bed mobility, functional bathroom mobility, and bed <> chair transfers x MOD A-MIN A x 2 assist due to decreased  strength and inability to utilize walker for support along with decreased balance and coordination. Patient and daughter educated on PROM and AROM exercises for BUE with cervical precautions (shoulder flexion limited to 90 degrees). OT also educated patient and family on BUE WB to facilitate wrist/digit extension seated EOB. Lower Body Dressing Assistance  Socks: Total assistance (dependent)         Cognitive Retraining  Safety/Judgement: Decreased insight into deficits; Decreased awareness of need for safety      Functional Measure:  Barthel Index:    Bathin  Bladder: 0  Bowels: 0  Groomin  Dressin  Feedin  Mobility: 5  Stairs: 0  Toilet Use: 5  Transfer (Bed to Chair and Back): 5  Total: 25/100        Percentage of impairment   0%   1-19%   20-39%   40-59%   60-79%   80-99%   100%   Barthel Score 0-100 100 99-80 79-60 59-40 20-39 1-19   0     The Barthel ADL Index: Guidelines  1. The index should be used as a record of what a patient does, not as a record of what a patient could do. 2. The main aim is to establish degree of independence from any help, physical or verbal, however minor and for whatever reason. 3. The need for supervision renders the patient not independent. 4. A patient's performance should be established using the best available evidence. Asking the patient, friends/relatives and nurses are the usual sources, but direct observation and common sense are also important. However direct testing is not needed. 5. Usually the patient's performance over the preceding 24-48 hours is important, but occasionally longer periods will be relevant.   6. Middle categories imply that the patient supplies over 50 per cent of the effort. 7. Use of aids to be independent is allowed. Emil Giordano., Barthel, D.W. (2603). Functional evaluation: the Barthel Index. [address] (14)2. RODDY Casey, Charlene Bay., Sundeep Blanc., [place], [facility] (1999). Measuring the change indisability after inpatient rehabilitation; comparison of the responsiveness of the Barthel Index and Functional [place] Measure. Journal of Neurology, Neurosurgery, and Psychiatry, 66(4), 232-154. Matias Gonzaels, NRAJENDRA.A, MAKEDA Stringer, & Trino Melton M.A. (2004.) Assessment of post-stroke quality of life in cost-effectiveness studies: The usefulness of the Barthel Index and the EuroQoL-5D. Quality of Life Research, 15, 197-89        Occupational Therapy Evaluation Charge Determination   History Examination Decision-Making   LOW Complexity : Brief history review  HIGH Complexity : 5 or more performance deficits relating to physical, cognitive , or psychosocial skils that result in activity limitations and / or participation restrictions MEDIUM Complexity : Patient may present with comorbidities that affect occupational performnce. Miniml to moderate modification of tasks or assistance (eg, physical or verbal ) with assesment(s) is necessary to enable patient to complete evaluation       Based on the above components, the patient evaluation is determined to be of the following complexity level: LOW   Pain:  Pain Scale 1: Numeric (0 - 10)  Pain Intensity 1: 3  Pain Location 1: Shoulder     Pain Description 1: Aching  Pain Intervention(s) 1: Rest  Activity Tolerance:   VSS  Please refer to the flowsheet for vital signs taken during this treatment. After treatment:   ? Patient left in no apparent distress sitting up in chair  ? Patient left in no apparent distress in bed  ? Call bell left within reach  ? Nursing notified  ? Caregiver present  ?  Bed alarm activated    COMMUNICATION/EDUCATION:   The patient?s plan of care was discussed with: Physical Therapist and Registered Nurse.  ? Home safety education was provided and the patient/caregiver indicated understanding. ? Patient/family have participated as able in goal setting and plan of care. ? Patient/family agree to work toward stated goals and plan of care. ? Patient understands intent and goals of therapy, but is neutral about his/her participation. ? Patient is unable to participate in goal setting and plan of care. This patient?s plan of care is appropriate for delegation to [facility].     Thank you for this referral.  Maryam Osborne  Time Calculation: 48 mins

## 2019-05-22 NOTE — PROGRESS NOTES
Hospitalist Progress Note  Sherron Hernandez MD  Answering service: [phone] OR 36 from in house phone  Cell: [phone]      Date of Service:  2019  NAME:  Katy Macdonald  :  1950  MRN:  [id number]      Admission Summary:   76-year-old man with a past medical history significant for hypertension, gastroesophageal reflux disease, Meniere's disease, who was in his usual state of health until the day of his presentation at the emergency room when the patient developed a change in mental status. The spouse stated that she was out of the house and when she came back found the patient laying prone in the backyard unresponsive. It is not clear how long the patient had been lying prone in the backyard. When the EMS arrived at the scene, it was reported that the patient was verbally responsive to the EMS crew that he had too much alcohol. The patient was administering alcohol by rectal enema stating that he wanted to try something new. The patient has no history of alcohol abuse according to his spouse, but today the patient had too much alcohol than usual.  The patient was brought to the emergency room for further evaluation. When the patient arrived at the emergency room, the patient remained lethargic, but easily arousable and also with verbal response. CT scan of the head was obtained, this was negative. The patient's alcohol level was 162. The patient was referred to the Hospitalist Service for evaluation for admission.   No record of prior admission to this hospital    Interval history / Subjective:       F/u AMS  Surgery yesterday  Pain controlled     Assessment & Plan:     B/L upper extremity weakness and tingling in arms with neck and shoulder pain POA but  More evident after he woke up later in hospitalization   -MRI cervical spine with central cord compression ,  -C3/4 due to central disc herniation   -s/p  Anterior cervical diskectomy, decompression, fusion to C3-4  -On Decadron  -Appreciate discussion with neurosurgery team     Delirium and fall , resolved on 5/18   -sec to stroke vs seizure  -MRI shows hippocampus restricted diffusion , maryley suggestive of seizure like activity ?  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  -Also initially though delirium due to alcohol intoxication also , alcohol level of 162    -EEG , no seizure , started on keppra by neuro though for now   -Echo EF 61-65% with no RWMA, no PFO  -ASA to be started when cleared from neurosurgery standpoint  -Continue on Keppra till seen by Dr Armand Davis  -The patient is back to baseline mental status  -Repeat MRI brain in 4 weeks    Seizure like activity   -Started on keppra by neuro   -MRI shows hippocampus restricted diffusion , nola suggestive of seizure like activity     Leukocytosis and lactic acidosis on admission   -likely reactive , may be had seizure , it is not SIRS , no infection     HTN   On prn hydralazine   Restart HCTZ , his home med as BP on higher side , likley from steroids   I wanted to start another agent but spouse wanted me to wait and see given risk of hypotension post op , makes sense   Use prn meds     Alcohol intoxication and now risk of withdrawal   Resolved     Dysphagia  -SLP/ENT, follow eval    Neurogenic bladder from above  -on anderson  -Will do voiding trial in 1-2 days (may be 5/23)    PT/OT     Code status:  Full   DVT prophylaxis: heparin s/q when cleared by Surgery    Plan: Follow Neurosurgery, ENT    Care Plan discussed with: Patient/Family  Disposition: TBD     Hospital Problems  Date Reviewed: 5/20/2019          Codes Class Noted POA    * (Principal) [facility] ICD-10-CM: I13.979  ICD-9-CM: 291.0  5/15/2019 Yes        Alcohol intoxication ([facility] ICD-10-CM: Z13.930  ICD-9-CM: 305.00  5/15/2019 Unknown                Review of Systems:   A comprehensive review of systems was negative except for that written in the HPI. Vital Signs:    Last 24hrs VS reviewed since prior progress note. Most recent are:  Visit Vitals  BP (!) 176/110 (BP 1 Location: Right arm, BP Patient Position: At rest)   Pulse 68   Temp 97.9 °F (36.6 °C)   Resp 19   Ht 5' 8\" (1.727 m)   Wt 74.5 kg (164 lb 3.9 oz)   SpO2 98%   BMI 24.97 kg/m²         Intake/Output Summary (Last 24 hours) at 5/22/2019 1111  Last data filed at 5/22/2019 0645  Gross per 24 hour   Intake 625 ml   Output 1225 ml   Net -600 ml        Physical Examination:             Constitutional:   Awake and feels better , face all flushed ,    ENT:  Oral mucous moist, oropharynx benign. Neck supple,    Resp:  CTA bilaterally. No wheezing/rhonchi/rales. No accessory muscle use   CV:  Regular rhythm, normal rate, no murmurs, gallops, rubs    GI:  Soft, non distended, non tender. normoactive bowel sounds, no hepatosplenomegaly     Musculoskeletal:  No edema, warm, 2+ pulses throughout    Neurologic:  upper extremity 3-4/5 b/l             Data Review:    Review and/or order of clinical lab test      Labs:     Recent Labs     05/22/19  0343   WBC 10.5   HGB 13.8   HCT 41.9        Recent Labs     05/22/19  0343      K 4.2      CO2 25   BUN 22*   CREA 0.72   *   CA 7.8*   MG 2.5*   PHOS 4.4     Recent Labs     05/22/19  0343   SGOT 18   ALT 46   AP 57   TBILI 0.7   TP 5.1*   ALB 2.5*   GLOB 2.6     No results for input(s): INR, PTP, APTT in the last 72 hours. No lab exists for component: INREXT, INREXT   No results for input(s): FE, TIBC, PSAT, FERR in the last 72 hours. No results found for: FOL, RBCF   No results for input(s): PH, PCO2, PO2 in the last 72 hours. No results for input(s): CPK, CKNDX, TROIQ in the last 72 hours.     No lab exists for component: CPKMB  Lab Results   Component Value Date/Time    Cholesterol, total 129 05/15/2019 01:54 AM    HDL Cholesterol 54 05/15/2019 01:54 AM    LDL, calculated 65 05/15/2019 01:54 AM Triglyceride 50 05/15/2019 01:54 AM    CHOL/HDL Ratio 2.4 05/15/2019 01:54 AM     Lab Results   Component Value Date/Time    Glucose (POC) 148 (H) 05/18/2019 04:47 PM    Glucose (POC) 170 (H) 05/18/2019 12:37 PM    Glucose (POC) 114 (H) 05/17/2019 04:42 PM    Glucose (POC) 82 05/17/2019 12:11 PM    Glucose (POC) 88 05/17/2019 06:25 AM     Lab Results   Component Value Date/Time    Color YELLOW/STRAW 05/14/2019 08:14 PM    Appearance CLEAR 05/14/2019 08:14 PM    Specific gravity 1.016 05/14/2019 08:14 PM    pH (UA) 5.5 05/14/2019 08:14 PM    Protein NEGATIVE  05/14/2019 08:14 PM    Glucose NEGATIVE  05/14/2019 08:14 PM    Ketone 15 (A) 05/14/2019 08:14 PM    Bilirubin NEGATIVE  05/14/2019 08:14 PM    Urobilinogen 0.2 05/14/2019 08:14 PM    Nitrites NEGATIVE  05/14/2019 08:14 PM    Leukocyte Esterase NEGATIVE  05/14/2019 08:14 PM         Medications Reviewed:     Current Facility-Administered Medications   Medication Dose Route Frequency    levETIRAcetam (KEPPRA) tablet 500 mg  500 mg Oral BID    0.9% sodium chloride infusion  125 mL/hr IntraVENous CONTINUOUS    sodium chloride (NS) flush 5-40 mL  5-40 mL IntraVENous Q8H    sodium chloride (NS) flush 5-40 mL  5-40 mL IntraVENous PRN    naloxone (NARCAN) injection 0.4 mg  0.4 mg IntraVENous PRN    acetaminophen (TYLENOL) tablet 650 mg  650 mg Oral Q6H    oxyCODONE IR (ROXICODONE) tablet 5 mg  5 mg Oral Q3H PRN    HYDROmorphone (DILAUDID) tablet 4 mg  4 mg Oral Q3H PRN    HYDROmorphone (PF) (DILAUDID) injection 0.5 mg  0.5 mg IntraVENous Q3H PRN    acetaminophen (TYLENOL) suppository 650 mg  650 mg Rectal N0D PRN    folic acid (FOLVITE) tablet 1 mg  1 mg Oral DAILY    thiamine HCL (B-1) tablet 100 mg  100 mg Oral DAILY    pantothenic ac-min oil-pet,hyd (AQUAPHOR) 41 % ointment   Topical PRN    dexamethasone (DECADRON) 4 mg/mL injection 4 mg  4 mg IntraVENous Q8H    pantoprazole (PROTONIX) tablet 40 mg  40 mg Oral DAILY    hydroCHLOROthiazide (HYDRODIURIL) tablet 25 mg  25 mg Oral DAILY    hydrALAZINE (APRESOLINE) 20 mg/mL injection 10 mg  10 mg IntraVENous Q4H PRN    metoprolol (LOPRESSOR) injection 2.5 mg  2.5 mg IntraVENous Q6H PRN    sodium chloride (NS) flush 5-40 mL  5-40 mL IntraVENous Q8H    sodium chloride (NS) flush 5-40 mL  5-40 mL IntraVENous PRN    ondansetron (ZOFRAN) injection 4 mg  4 mg IntraVENous Q4H PRN    naloxone (NARCAN) injection 0.4 mg  0.4 mg IntraVENous PRN    bisacodyl (DULCOLAX) tablet 5 mg  5 mg Oral DAILY PRN    acetaminophen (TYLENOL) tablet 650 mg  650 mg Oral Q4H PRN    lidocaine (LIDODERM) 5 % patch 1 Patch  1 Patch TransDERmal Q24H    prochlorperazine (COMPAZINE) tablet 10 mg  10 mg Oral Q6H PRN    phenol throat spray (CHLORASEPTIC) 1 Spray  1 Spray Oral PRN     ______________________________________________________________________  EXPECTED LENGTH OF STAY: 4d 21h  ACTUAL LENGTH OF STAY:          7                 Giles Khanna MD

## 2019-05-22 NOTE — PROGRESS NOTES
[facility]      Full consult to follow    Contacted this evening for the first time by Sam Coley, nursing station, at [facility] regarding Mr. Julio Cesar Alves. Apparently they tried to reach Office yesterday. Details are not available. Patient is apparently status post major cervical spine surgery, successfully performed yesterday and according to nursing staff, is having worsening dyspha anna . And, there is some interest on hospital staff in an oral gastric tube insertion. Assessment and recommendations    Postoperative dysphagia coarsening. Major cervical spine surgery performed and some worsening is not uncommon. Patient should get and will get a fiber optic exam of the larynx and pharynx and this can be done tomorrow at bedside. Possible explanations include such things as recurrent  laryngeal nerve we akness and vagal  nerve weakness postoperative as well as simple laryngeal and pharyngeal edema. To be determined. Regarding interest in oral gastric tube insertion for NPO status, if nasogastric tube is not acceptable and neither are possible or passable, please consult Radiology interventional to see if they can assist in passing under radiology guidance.

## 2019-05-22 NOTE — PROGRESS NOTES
Problem: Mobility Impaired (Adult and Pediatric)  Goal: *Acute Goals and Plan of Care (Insert Text)  Description  Physical Therapy Goals  Initiated 5/18/2019  1. Patient will move from supine to sit and sit to supine , scoot up and down and roll side to side in bed with minimal assistance/contact guard assist within 7 day(s). 2.  Patient will transfer from bed to chair and chair to bed with minimal assistance/contact guard assist using the least restrictive device within 7 day(s). 3.  Patient will perform sit to stand with minimal assistance/contact guard assist within 7 day(s). 4.  Patient will ambulate with minimal assistance/contact guard assist for 150 feet with the least restrictive device within 7 day(s). 5.  Patient will participate in a strengthening program and be independent within 7 days. Outcome: Progressing Towards Goal   PHYSICAL THERAPY TREATMENT:  REASSESSMENT  Patient: Jesus Herr (20 y.o. male)  Date: 5/22/2019  Diagnosis: Alcohol intoxication ([facility] [address] [F10.929] Alcohol intoxication delirium ([facility] [address]  Procedure(s) (LRB):  C3-4 ANTERIOR CERVICAL DISCECTOMY FUSION (N/A) 1 Day Post-Op  Precautions: Fall, Spinal, No bending, no lifting greater than 5 lbs, no twisting, log-roll technique, repositioning every 20-30 min except when sleeping, brace on at all times    Chart, physical therapy assessment, plan of care and goals were reviewed. ASSESSMENT:  Based on the objective data described below, the patient presents with Moderate Assistance and Assist x2 level overall for transfers. Gait training completed at Moderate Assistance and Assist x2, 20 feet and using a hand hold assist x 2 due to pt's inability to  walker with BUE weakness. Recommend using a platform walker or [employer] [place] walker. Highly recommend inpatient rehab.    The following are barriers to independence while in acute care:   -Cognitive and/or behavioral: insight into deficits  -Medical condition: ROM, strength, functional reach, sitting balance, standing balance, hypertension, precautions, pain tolerance, motor planning, proprioception, sensation, coordination and ETOH    -Other:   Meniere's disease     Prior level of function: Independent PTA, assist with baby sitting for 1year old grandson     PLAN:  Patient continues to benefit from skilled intervention to address the above impairments. Continue treatment per established plan of care. Recommend with staff: OOB for meals and bedside commode with assistance   Recommend next PT session: trial with platform walker or [employer] [place] walker  Discharge recommendations: Rehab: patient is working towards tolerating 3 hours of therapy (to regain functional baseline patient requires rehab)  If above is not an option then recommend: Rehab at skilled nursing facility (SNF) (to regain functional baseline patient requires rehab)    Patient's barriers to discharging home, in addition to above impairments: family availability to assist  level of physical assist required to maintain patient safety. Equipment recommendations for successful discharge (if) home: to be determined pending progress       SUBJECTIVE:   Patient agreeable to therapy and reported minimal pain at rest.     OBJECTIVE DATA SUMMARY:   Critical Behavior:  Neurologic State: Alert, Appropriate for age  Orientation Level: Oriented to person  Cognition: Follows commands  Safety/Judgement: Decreased insight into deficits, Decreased awareness of need for safety  Functional Mobility Training:  Bed Mobility:  Rolling: Minimum assistance  Supine to Sit: Moderate assistance;Assist x2; Additional time              Transfers:  Sit to Stand: Minimum assistance;Assist x1;Additional time; Adaptive equipment  Stand to Sit: Minimum assistance;Assist x2; Additional time; Adaptive equipment        Bed to Chair: Minimum assistance;Assist x2; Additional time                    Balance:  Sitting: Impaired; Without support  Sitting - Static: Fair (occasional)  Sitting - Dynamic: Poor (constant support)  Standing: With support; Impaired  Standing - Static: Poor;Constant support  Standing - Dynamic : Poor;Constant support  Ambulation/Gait Training:  Distance (ft): 20 Feet (ft)  Assistive Device: Gait belt(hand hold assist)  Ambulation - Level of Assistance: Moderate assistance;Assist x2        Gait Abnormalities: Shuffling gait;Trunk sway increased        Base of Support: Narrowed     Speed/Olive: Pace decreased (<100 feet/min); Shuffled                     Pain:  Pre treatment: 4 /10   During treatment: 4/10  Post treatment:  4/10   Location: neck  Description:aching   Aggravating factors: did not increase with activity     Activity Tolerance:   Fair  Please refer to the flowsheet for vital signs taken during this treatment.   Vitals:    05/22/19 0700 05/22/19 0818 05/22/19 1030 05/22/19 1352   BP:  157/77 (!) 176/110 152/78   BP 1 Location:   Right arm Right arm   BP Patient Position:   At rest At rest   Pulse:  63 68 (!) 57   Resp:  19  24   Temp:  97.9 °F (36.6 °C)  98.5 °F (36.9 °C)   SpO2:  98% 98% 100%   Weight: 74.5 kg (164 lb 3.9 oz)      Height: 5' 8\" (1.727 m)         After treatment patient left:   Up in chair  Call light within reach     COMMUNICATION/COLLABORATION:   The patients plan of care was discussed with: Registered Nurse    Mariella Guerra   Time Calculation: 38 mins

## 2019-05-22 NOTE — PROGRESS NOTES
CM met with pt and his wife to discuss rehab. Pt would like to go to [facility] if possible. CM encouraged pt and wife to think about an alternative if pt is not approved. The wife stated that she does not want to talk about an alternative. PHILIP sent a referral to [address] [place] [address] in [facility].  Armani Benites

## 2019-05-22 NOTE — PROGRESS NOTES
Neurosurgery Spine Progress Note  Jessica Hawk, AGACNP-BC  Work Cell: [phone]    Post Op Day: 1 Day Post-Op    May 22, 2019 1:17 PM     Jorge Batista    HPI:      Jorge Batista is a 78 y.o. male with history of Meniere's disease, hypertension and GERD who was found down in his backyard unresponsive with foam at the mouth. EMS was called and patient was brought to [facility] ED and he was found to have an alcohol level of 162. Neurology was consulted for possible seizure and he was started on Keppra. After recovery from his intoxication and lethargy his family noticed he had weakness in his arms. It was also noted that he had urinary retention and difficulty swallowing. MRI of the cervical spine was completed which showed disc herniation at C3-C4 level with some edema in the central cord. The neurosurgery service was consulted for evaluation. Subjective      Patient has worsened dysphagia postoperatively. Feels like his arms are a little stronger. States he can't feel where his hands are and needs to look at them to know their position. He denies headache, dizziness, chest pain, sob, abdominal pain, fever, chills, or nausea/vomiting. Objective:     Physical Exam:  General:  Alert and oriented. No acute distress. Respiratory:  Breathing unlabored. Equal chest expansion   Abdomen:   CV: Soft, non-tender, non-distended   No edema appreciated in the extremities   Neurologic:        Musculoskeletal:  Speech fluent. No facial droop. Follows commands. BUENO. Dense numbness hands with impaired proprioception. Motor: deltoids 4/5, biceps 3/5, triceps 3/5, 1-2/5 in his  strength. Lower extremities approximately 4+/5. - nair. + babinski. Calves soft, nontender upon palpation and with passive stretch. Moves both upper and lower extremities. Incision: clean, dry, and intact. No significant erythema or swelling. No active drainage noted.              Vital Signs:    Patient Vitals for the past 8 hrs:   BP Temp Pulse Resp SpO2 Height Weight   19 1030 (!) 176/110 -- 68 -- 98 % -- --   19 0818 157/77 97.9 °F (36.6 °C) 63 19 98 % -- --   19 0700 -- -- -- -- -- 5' 8\" (1.727 m) 74.5 kg (164 lb 3.9 oz)     Temp (24hrs), Av °F (36.7 °C), Min:96.5 °F (35.8 °C), Max:99 °F (37.2 °C)      Intake/Output:  No intake/output data recorded.  1901 -  0700  In: 625 [I.V.:625]  Out: 3175 [Urine:3125]    Pain Control:   Pain Assessment  Pain Scale 1: Numeric (0 - 10)  Pain Intensity 1: 7  Pain Onset 1: postop  Pain Location 1: Shoulder  Pain Orientation 1: Right, Left  Pain Description 1: Aching  Pain Intervention(s) 1: Medication (see MAR)    LAB:    Recent Labs     19  0343   HCT 41.9   HGB 13.8     Lab Results   Component Value Date/Time    Sodium 138 2019 03:43 AM    Potassium 4.2 2019 03:43 AM    Chloride 106 2019 03:43 AM    CO2 25 2019 03:43 AM    Glucose 112 (H) 2019 03:43 AM    BUN 22 (H) 2019 03:43 AM    Creatinine 0.72 2019 03:43 AM    Calcium 7.8 (L) 2019 03:43 AM       PT/OT:   Gait:                      Assessment/Plan      1. Central Cord Syndrome with spinal cord signal change at C3-4 after GLF   -MRI revealed C3-4 herniated disc with cord edema and cervical stenosis   -POD#1 C3-4 ANTERIOR CERVICAL DISCECTOMY FUSION   -Continue PT/OT. Patient would greatly benefit from inpatient rehab to regain his strength and function following a cervical spinal cord injury   -Physiatry consult   -Pain control- PRN oxycodone, tylenol   -wean decadron   -stop IV toradol   -Incentive Spirometer   -Tolerating diet   -VTE Prophylaxes - TEDS & SCDs    2.  Neurogenic bladder   -due to #1   -anderson in place for 5 days. Consider voiding trial tomorrow to see if bladder function has returned     3. Alcohol abuse   -counseling as able   -thiamine, folic acid    4. Dysphagia   -SLP consulted- patient with severe pharyngeal dysphagia. Recommendations to maintain NPO status. 5. Possible seizure   -? Etoh induced. Brain MRI changes seen in the left medial hippocampus- needs repeat imaging in 1 month with neuro   -tolerating low dose Keppra. Neuro recommends continuing on discharge    6. Hypertension   -BP poorly controlled- likely pain contributing   -manage pain, continue hctz    7. Menieres disease   -patient on hctz to help with sodium diuresis. Can consider meclizine if he becomes symptomatic with dizziness    8.  Possible CVA   -MRA head/neck revealed moderate left P2 stenosis without occlusion.   -discussed with hospitalist- ok to start ASA on POD#3 (Friday    Plan above discussed with Dr. Bushra Gomez, Dr. Diana Pedersen, and bedside nurse    Discharge To:  Rehab    Signed By: Hernesto Valencia NP

## 2019-05-22 NOTE — PROGRESS NOTES
Problem: Dysphagia (Adult)  Goal: *Acute Goals and Plan of Care (Insert Text)  Description  Speech pathology goals  Initiated 5/16/2019  1. Patient will participate in re-evaluation of swallow function within 7 days   Outcome: [facility] TREATMENT  Patient: Asha Borden (56 y.o. male)  Date: 5/22/2019  Diagnosis: Alcohol intoxication ([facility] [address] [F10.929] Alcohol intoxication delirium ([facility] [address]  Procedure(s) (LRB):  C3-4 ANTERIOR CERVICAL DISCECTOMY FUSION (N/A) 1 Day Post-Op  Precautions: swallow Fall, Spinal    ASSESSMENT:  Patient is POD #1 s/p C3-4 ACDF, now with severe pharyngeal dysphagia. Patient with delayed swallow initiation, decreased laryngeal elevation, and 6-8 swallows per small bolus which highly suggests pharyngeal residue. Patient with coughing and throat clearing with every swallow. Recommend NPO with non-oral route for medications, and SLP to re-evaluate tomorrow. Suspect decline in swallow function is related to edema s/p ACDF, and note patient is on Decadron. Progression toward goals:  ?         Improving appropriately and progressing toward goals  X         Improving slowly and progressing toward goals  ? Not making progress toward goals and plan of care will be adjusted     PLAN:  Recommendations and Planned Interventions:  --Strict NPO  --Frequent oral care. Oral swabs ok  --Non-oral route for medications  --SLP to follow up tomorrow to re-evaluate swallowing  Patient continues to benefit from skilled intervention to address the above impairments. Continue treatment per established plan of care. Discharge Recommendations: To Be Determined     SUBJECTIVE:   Patient stated It's worse now re: swallowing.     OBJECTIVE:   Cognitive and Communication Status:  Neurologic State: Alert, Appropriate for age  Orientation Level: Oriented to person  Cognition: Follows commands  Perception: Appears intact  Perseveration: No perseveration noted  Safety/Judgement: Awareness of environment  Dysphagia Treatment:     P.O. Trials:  Patient Position: sitting in  chair  Vocal quality prior to P.O.: No impairment  Consistency Presented: Ice chips; Thin liquid;Puree  How Presented: SLP-fed/presented;Spoon     Bolus Acceptance: No impairment  Bolus Formation/Control: No impairment     Propulsion: No impairment  Oral Residue: None  Initiation of Swallow: Delayed (# of seconds)  Laryngeal Elevation: Decreased  Aspiration Signs/Symptoms: Clear throat;Strong cough  Pharyngeal Phase Characteristics: Multiple swallows; Suspected pharyngeal residue; Other (comment)(6-8 per bolus)  Effective Modifications: None  Cues for Modifications: None          Pharyngeal Phase Severity : Severe         Pain:  Pain Scale 1: Numeric (0 - 10)  Pain Intensity 1: 3  Pain Location 1: Shoulder  After treatment:   X              Patient left in no apparent distress sitting up in chair  ? Patient left in no apparent distress in bed  X              Call bell left within reach  X              Nursing notified  X              Caregiver present  ? Bed alarm activated    COMMUNICATION/EDUCATION:   Patient was educated regarding His deficit(s) of dysphagia as this relates to His diagnosis of ACDF. He demonstrated Good understanding as evidenced by verbalizing understanding. The patients plan of care including recommendations, planned interventions, and recommended diet changes were discussed with: Registered Nurse.       Edith Leventhal, SLP  Time Calculation: 15 mins

## 2019-05-22 NOTE — PROGRESS NOTES
POD 1  Failed swallow eval  afeb, episodes of elevated BP  WBC 10, Na 138  Alert  Antigravity in proximal UE (3/5), 2/5 hands  Paresthesias in LE  Clonus bilateral right>left  S/P: ACDF 3/4 for decompression, spinal cord injury (central cord syndrome)  Will require inpatient spinal cord rehab  Will wean steroids  NPO for now, SLP will reassess tomorrow  Meniere's disease - will follow sodium and consider adding meclizine

## 2019-05-22 NOTE — PROGRESS NOTES
Problem: Mobility Impaired (Adult and Pediatric)  Goal: *Acute Goals and Plan of Care (Insert Text)  Description  Physical Therapy Goals  Initiated 5/18/2019  1. Patient will move from supine to sit and sit to supine , scoot up and down and roll side to side in bed with minimal assistance/contact guard assist within 7 day(s). 2.  Patient will transfer from bed to chair and chair to bed with minimal assistance/contact guard assist using the least restrictive device within 7 day(s). 3.  Patient will perform sit to stand with minimal assistance/contact guard assist within 7 day(s). 4.  Patient will ambulate with minimal assistance/contact guard assist for 150 feet with the least restrictive device within 7 day(s). 5.  Patient will participate in a strengthening program and be independent within 7 days. 5/22/2019 1253 by Burak Borrego  Outcome: Progressing Towards Goal     Problem: Mobility Impaired (Adult and Pediatric)  Goal: *Acute Goals and Plan of Care (Insert Text)  Description  Physical Therapy Goals  Initiated 5/18/2019  1. Patient will move from supine to sit and sit to supine , scoot up and down and roll side to side in bed with minimal assistance/contact guard assist within 7 day(s). 2.  Patient will transfer from bed to chair and chair to bed with minimal assistance/contact guard assist using the least restrictive device within 7 day(s). 3.  Patient will perform sit to stand with minimal assistance/contact guard assist within 7 day(s). 4.  Patient will ambulate with minimal assistance/contact guard assist for 150 feet with the least restrictive device within 7 day(s). 5.  Patient will participate in a strengthening program and be independent within 7 days.    5/22/2019 1856 by Burak Borrego  Outcome: Progressing Towards Goal   PHYSICAL THERAPY TREATMENT: Spine  Patient: Hilton Alba (2 y.o. male)  Date: 5/22/2019  Diagnosis: Alcohol intoxication ([facility] [address] [F10.929] Alcohol intoxication delirium ([address]  Procedure(s) (LRB):  C3-4 ANTERIOR CERVICAL DISCECTOMY FUSION (N/A) 1 Day Post-Op  Precautions: Fall, Spinal No bending, no lifting greater than 5 lbs, no twisting, log-roll technique, repositioning every 20-30 min except when sleeping, brace when OOB    Chart, physical therapy assessment, plan of care and goals were reviewed. ASSESSMENT:  Based on the objective data described below, the patient presents with Moderate Assistance level overall for transfers. Gait training completed at Moderate assistance, 50 feet and using a [employer]. Pt has poor standing balance and ataxic gait. He is unable to use a rolling walker at this time due to BUE weakness, decreased sensation, and decreased coordination. Will continue to follow while here in hospital to increase patient independence. Highly recommend inpatient rehab for spinal cord rehab. The following are barriers to independence while in acute care:   -Cognitive and/or behavioral: safety awareness and insight into deficits  -Medical condition: ROM, strength, functional reach, sitting balance, standing balance, precautions, pain tolerance, medical history, motor planning, proprioception, sensation and coordination    -Other:   history of Meniere's disease    Prior level of function: Independent PTA, assist with baby sitting for 1year old grandson     PLAN:  Patient continues to benefit from skilled intervention to address the above impairments. Continue treatment per established plan of care.   Recommend with staff: OOB to chair and bedside commode with staff assistance   Recommend next PT session: BUE strengthening, progressive mobility and gait training   Discharge recommendations: Rehab: patient is working towards tolerating 3 hours of therapy (to regain functional baseline patient requires rehab)  If above is not an option then recommend: Rehab at skilled nursing facility (SNF) (to regain functional baseline patient requires rehab)    Patient's barriers to discharging home, in addition to above impairments: is single caregiver for child(carlita)  level of physical assist required to maintain patient safety, high fall risk    Equipment recommendations for successful discharge (if) home: reacher recommended, assistive device to be determined pending progress         SUBJECTIVE:   Patient stated ? My hearing seems to be worse. ?  Pt has Meniere's disease with bilateral hearing loss. OBJECTIVE DATA SUMMARY:   Critical Behavior:  Neurologic State: Alert, Appropriate for age  Orientation Level: Oriented to person  Cognition: Follows commands  Safety/Judgement: Decreased insight into deficits, Decreased awareness of need for safety    The patient stated 0 /3  precautions. Reviewed all 3 with patient. Functional Mobility Training:  Bed Mobility:  Rolling: Minimum assistance;Assist x2;Adaptive equipment; Additional time  Supine to Sit: Moderate assistance;Assist x2; Additional time              Transfers:  Sit to Stand: Minimum assistance;Assist x2;Adaptive equipment; Additional time  Stand to Sit: Minimum assistance;Assist x2; Additional time; Adaptive equipment        Bed to Chair: Minimum assistance;Assist x2; Additional time                    Balance:  Sitting: Impaired; Without support  Sitting - Static: Fair (occasional)  Sitting - Dynamic: Poor (constant support)  Standing: With support; Impaired  Standing - Static: Poor;Constant support  Standing - Dynamic : Poor;Constant support      Ambulation/Gait Training:  Distance (ft): 50 Feet (ft)  Assistive Device: Gait belt([place] walker)  Ambulation - Level of Assistance:  Moderate assistance;Assist x2        Gait Abnormalities: Shuffling gait;Trunk sway increased, ataxic gait pattern, crossed feet on one occasion         Base of Support: Narrowed     Speed/Olive: Shuffled;Pace decreased (<100 feet/min)                   Pain:  During treatment: 5/10  Location: posterior shoulders Description:aching   Aggravating factors: mobility     Activity Tolerance:   Fair, /85, HR 69      After treatment patient left:   Up in chair  Call light within reach     COMMUNICATION/COLLABORATION:   The patient?s plan of care was discussed with: Registered Nurse    Chapincito Connors   Time Calculation: 22 mins

## 2019-05-22 NOTE — ROUTINE PROCESS
Primary Nurse Asha Boone RN and Maricruz Rose RN performed a dual skin assessment on this patient. Anterior neck dressing clean, dry, intact. Cervical collar in place. Bilateral heels pink, blanchable. Elevated on pillows. Blisters and peeling skin to sacrum. Dressing intact. Red, warm skin across chest and back. Wound open to air on right shin. Scab intact.    Sha score is 18

## 2019-05-23 ENCOUNTER — APPOINTMENT (OUTPATIENT)
Dept: GENERAL RADIOLOGY | Age: 69
DRG: 028 | End: 2019-05-23
Attending: INTERNAL MEDICINE
Payer: MEDICARE

## 2019-05-23 PROCEDURE — 74011250637 HC RX REV CODE- 250/637: Performed by: INTERNAL MEDICINE

## 2019-05-23 PROCEDURE — 74011250636 HC RX REV CODE- 250/636: Performed by: NURSE PRACTITIONER

## 2019-05-23 PROCEDURE — 74340 X-RAY GUIDE FOR GI TUBE: CPT

## 2019-05-23 PROCEDURE — 97116 GAIT TRAINING THERAPY: CPT

## 2019-05-23 PROCEDURE — 97110 THERAPEUTIC EXERCISES: CPT

## 2019-05-23 PROCEDURE — 51798 US URINE CAPACITY MEASURE: CPT

## 2019-05-23 PROCEDURE — 74011250636 HC RX REV CODE- 250/636: Performed by: INTERNAL MEDICINE

## 2019-05-23 PROCEDURE — 74011636320 HC RX REV CODE- 636/320: Performed by: INTERNAL MEDICINE

## 2019-05-23 PROCEDURE — 92526 ORAL FUNCTION THERAPY: CPT

## 2019-05-23 PROCEDURE — 65660000000 HC RM CCU STEPDOWN

## 2019-05-23 PROCEDURE — 97112 NEUROMUSCULAR REEDUCATION: CPT

## 2019-05-23 PROCEDURE — 97530 THERAPEUTIC ACTIVITIES: CPT

## 2019-05-23 PROCEDURE — 74011250637 HC RX REV CODE- 250/637: Performed by: OTOLARYNGOLOGY

## 2019-05-23 PROCEDURE — 74011000250 HC RX REV CODE- 250: Performed by: OTOLARYNGOLOGY

## 2019-05-23 PROCEDURE — 74011000258 HC RX REV CODE- 258: Performed by: HOSPITALIST

## 2019-05-23 PROCEDURE — 74011000250 HC RX REV CODE- 250

## 2019-05-23 PROCEDURE — 77030011943

## 2019-05-23 PROCEDURE — 74011250636 HC RX REV CODE- 250/636: Performed by: HOSPITALIST

## 2019-05-23 PROCEDURE — 74011000250 HC RX REV CODE- 250: Performed by: INTERNAL MEDICINE

## 2019-05-23 RX ORDER — OXYMETAZOLINE HCL 0.05 %
3 SPRAY, NON-AEROSOL (ML) NASAL ONCE
Status: COMPLETED | OUTPATIENT
Start: 2019-05-23 | End: 2019-05-23

## 2019-05-23 RX ORDER — LIDOCAINE 50 MG/G
1 PATCH TOPICAL EVERY 24 HOURS
Status: DISCONTINUED | OUTPATIENT
Start: 2019-05-23 | End: 2019-05-23

## 2019-05-23 RX ORDER — LIDOCAINE 50 MG/G
2 PATCH TOPICAL EVERY 24 HOURS
Status: DISCONTINUED | OUTPATIENT
Start: 2019-05-23 | End: 2019-06-01 | Stop reason: HOSPADM

## 2019-05-23 RX ORDER — LIDOCAINE HYDROCHLORIDE 20 MG/ML
15 SOLUTION OROPHARYNGEAL AS NEEDED
Status: DISCONTINUED | OUTPATIENT
Start: 2019-05-23 | End: 2019-06-01 | Stop reason: HOSPADM

## 2019-05-23 RX ORDER — POLYETHYLENE GLYCOL 3350 17 G/17G
17 POWDER, FOR SOLUTION ORAL DAILY
Status: DISCONTINUED | OUTPATIENT
Start: 2019-05-23 | End: 2019-06-01 | Stop reason: HOSPADM

## 2019-05-23 RX ORDER — SENNOSIDES 8.6 MG/1
2 TABLET ORAL DAILY
Status: DISCONTINUED | OUTPATIENT
Start: 2019-05-23 | End: 2019-06-01 | Stop reason: HOSPADM

## 2019-05-23 RX ORDER — LIDOCAINE HYDROCHLORIDE 20 MG/ML
JELLY TOPICAL
Status: COMPLETED
Start: 2019-05-23 | End: 2019-05-23

## 2019-05-23 RX ORDER — DOCUSATE SODIUM 100 MG/1
100 CAPSULE, LIQUID FILLED ORAL 2 TIMES DAILY
Status: DISCONTINUED | OUTPATIENT
Start: 2019-05-23 | End: 2019-05-30

## 2019-05-23 RX ORDER — FACIAL-BODY WIPES
10 EACH TOPICAL DAILY PRN
Status: DISCONTINUED | OUTPATIENT
Start: 2019-05-23 | End: 2019-05-30

## 2019-05-23 RX ADMIN — SODIUM CHLORIDE 500 MG: 900 INJECTION, SOLUTION INTRAVENOUS at 03:11

## 2019-05-23 RX ADMIN — HYDRALAZINE HYDROCHLORIDE 10 MG: 20 INJECTION INTRAMUSCULAR; INTRAVENOUS at 09:23

## 2019-05-23 RX ADMIN — HYDROMORPHONE HYDROCHLORIDE 0.5 MG: 1 INJECTION, SOLUTION INTRAMUSCULAR; INTRAVENOUS; SUBCUTANEOUS at 04:39

## 2019-05-23 RX ADMIN — LIDOCAINE HYDROCHLORIDE 15 ML: 20 SOLUTION ORAL; TOPICAL at 10:05

## 2019-05-23 RX ADMIN — DEXAMETHASONE SODIUM PHOSPHATE 4 MG: 4 INJECTION, SOLUTION INTRAMUSCULAR; INTRAVENOUS at 20:51

## 2019-05-23 RX ADMIN — ACETAMINOPHEN 650 MG: 650 SOLUTION ORAL at 19:10

## 2019-05-23 RX ADMIN — HYDROMORPHONE HYDROCHLORIDE 0.5 MG: 1 INJECTION, SOLUTION INTRAMUSCULAR; INTRAVENOUS; SUBCUTANEOUS at 20:51

## 2019-05-23 RX ADMIN — Medication 10 ML: at 14:27

## 2019-05-23 RX ADMIN — IOHEXOL 50 ML: 350 INJECTION, SOLUTION INTRAVENOUS at 13:00

## 2019-05-23 RX ADMIN — HYDROMORPHONE HYDROCHLORIDE 0.5 MG: 1 INJECTION, SOLUTION INTRAMUSCULAR; INTRAVENOUS; SUBCUTANEOUS at 00:14

## 2019-05-23 RX ADMIN — OXYMETAZOLINE HYDROCHLORIDE 3 SPRAY: 5 SPRAY NASAL at 10:05

## 2019-05-23 RX ADMIN — LIDOCAINE HYDROCHLORIDE 5 ML: 20 JELLY TOPICAL at 13:00

## 2019-05-23 RX ADMIN — Medication 10 ML: at 04:39

## 2019-05-23 RX ADMIN — SODIUM CHLORIDE 500 MG: 900 INJECTION, SOLUTION INTRAVENOUS at 17:38

## 2019-05-23 RX ADMIN — DEXAMETHASONE SODIUM PHOSPHATE 4 MG: 4 INJECTION, SOLUTION INTRAMUSCULAR; INTRAVENOUS at 09:23

## 2019-05-23 RX ADMIN — Medication 10 ML: at 20:51

## 2019-05-23 RX ADMIN — HYDROMORPHONE HYDROCHLORIDE 0.5 MG: 1 INJECTION, SOLUTION INTRAMUSCULAR; INTRAVENOUS; SUBCUTANEOUS at 10:35

## 2019-05-23 RX ADMIN — HYDROMORPHONE HYDROCHLORIDE 0.5 MG: 1 INJECTION, SOLUTION INTRAMUSCULAR; INTRAVENOUS; SUBCUTANEOUS at 14:26

## 2019-05-23 NOTE — PROGRESS NOTES
Physical Therapy  5/23/2019    Attempted to see pt for PM session. Pt just finished working with OT and was off floor for dobhoff placement prior. Pt and wife reporting fatigue from today's schedule and requesting to rest at this time.  Will follow up tomorrow AM.    Thank You,  Zak Ratliff, PT, DPT

## 2019-05-23 NOTE — PROGRESS NOTES
Hospitalist Progress Note  Rick David MD  Answering service: [phone] OR 36 from in house phone  Cell: [phone]      Date of Service:  2019  NAME:  Pablo Wynn  :  1950  MRN:  [id number]      Admission Summary:   65-year-old man with a past medical history significant for hypertension, gastroesophageal reflux disease, Meniere's disease, who was in his usual state of health until the day of his presentation at the emergency room when the patient developed a change in mental status. The spouse stated that she was out of the house and when she came back found the patient laying prone in the backyard unresponsive. It is not clear how long the patient had been lying prone in the backyard. When the EMS arrived at the scene, it was reported that the patient was verbally responsive to the EMS crew that he had too much alcohol. The patient was administering alcohol by rectal enema stating that he wanted to try something new. The patient has no history of alcohol abuse according to his spouse, but today the patient had too much alcohol than usual.  The patient was brought to the emergency room for further evaluation. When the patient arrived at the emergency room, the patient remained lethargic, but easily arousable and also with verbal response. CT scan of the head was obtained, this was negative. The patient's alcohol level was 162. The patient was referred to the Hospitalist Service for evaluation for admission. No record of prior admission to this hospital    Interval history / Subjective:   F/U after cord compression, AMS   Pain is under control with current pain medications. Failed swallow bedside yesterday and remains NPO.  ENT scoped bedside today and found a lot of edema, and R vocal cord parlaysis  Planning for Dophoff placement today      Assessment & Plan:     B/L upper extremity weakness and tingling in arms with neck and shoulder pain POA but  more evident after he woke up later in hospitalization  - secondary to cervical cord compression from C3/C4 disc herniation  -s/p 5/21 Anterior cervical diskectomy, decompression, fusion to C3-4  -On Decadron, wean per NSGY  -NSGY following  - PT/OT    Delirium and fall , resolved on 5/18  -possibly secondary to seizure  -MRI shows hippocampus restricted diffusion , likley suggestive of seizure like activity ?  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  -Also initially though delirium compounded by alcohol intoxication, level 162  -EEG , no seizure , started on keppra by neuro though for now   -Echo EF 61-65% with no RWMA, no PFO  -ASA to be started when cleared from neurosurgery standpoint  -Continue on Keppra till seen by Dr Stephanie Posadas  -The patient is back to baseline mental status  -Repeat MRI brain in 4 weeks    Dysphagia - secondary to edema, R vocal cord paralysis  - SLP following, if no improvement will need to discuss PEG  - ENT following  - Dophoff placement today     Seizure like activity   -Started on keppra by neuro   -MRI shows hippocampus restricted diffusion , likley suggestive of seizure like activity     Leukocytosis and lactic acidosis on admission- reactive 2/2 seizure, resolved.    HTN - On prn hydralazine, continue HCTZ, BP goal in hospital less than 160  Alcohol intoxication and now risk of withdrawal Resolved     Neurogenic bladder from above  -on anderson  - Void trial today     PT/OT     Code status:  Full   DVT prophylaxis: heparin s/q when cleared by Surgery    Care Plan discussed with: Patient/Family  Disposition: TBD     Hospital Problems  Date Reviewed: 5/20/2019          Codes Class Noted POA    Spinal cord injury at C1-C4 level [facility]) ICD-10-CM: S14.101A  ICD-9-CM: 952.00  5/22/2019 Unknown        * (Principal) Alcohol intoxication delirium ([facility] [address] ICD-10-CM: Y96.551  ICD-9-CM: 291.0  5/15/2019 Yes        Alcohol intoxication ([facility] [address] ICD-10-CM: F10.929  ICD-9-CM: 305.00  5/15/2019 Unknown                Review of Systems:   A comprehensive review of systems was negative except for that written in the HPI. Vital Signs:    Last 24hrs VS reviewed since prior progress note. Most recent are:  Visit Vitals  /76   Pulse 61   Temp 98.6 °F (37 °C)   Resp 18   Ht 5' 8\" (1.727 m)   Wt 74.5 kg (164 lb 3.9 oz)   SpO2 96%   BMI 24.97 kg/m²         Intake/Output Summary (Last 24 hours) at 5/23/2019 1447  Last data filed at 5/23/2019 0445  Gross per 24 hour   Intake --   Output 2325 ml   Net -2325 ml        Physical Examination:             Constitutional:   Awake and conversant, NAD   ENT:  Oral mucous moist, oropharynx benign. + C collar in place   Resp:  CTA bilaterally. No wheezing/rhonchi/rales. No accessory muscle use   CV:  Regular rhythm, normal rate, no murmurs, gallops, rubs    GI:  Soft, non distended, non tender. normoactive bowel sounds, no hepatosplenomegaly     Musculoskeletal:  No edema, warm, 2+ pulses throughout    Neurologic:  upper extremity 3-4/5 b/l             Data Review: All imaging and laboratory data reviewed. Labs:     Recent Labs     05/22/19  0343   WBC 10.5   HGB 13.8   HCT 41.9        Recent Labs     05/22/19  0343      K 4.2      CO2 25   BUN 22*   CREA 0.72   *   CA 7.8*   MG 2.5*   PHOS 4.4     Recent Labs     05/22/19  0343   SGOT 18   ALT 46   AP 57   TBILI 0.7   TP 5.1*   ALB 2.5*   GLOB 2.6     No results for input(s): INR, PTP, APTT in the last 72 hours. No lab exists for component: INREXT, INREXT   No results for input(s): FE, TIBC, PSAT, FERR in the last 72 hours. No results found for: FOL, RBCF   No results for input(s): PH, PCO2, PO2 in the last 72 hours. No results for input(s): CPK, CKNDX, TROIQ in the last 72 hours.     No lab exists for component: CPKMB  Lab Results   Component Value Date/Time    Cholesterol, total 129 05/15/2019 01:54 AM    HDL Cholesterol 54 05/15/2019 01:54 AM    LDL, calculated 65 05/15/2019 01:54 AM    Triglyceride 50 05/15/2019 01:54 AM    CHOL/HDL Ratio 2.4 05/15/2019 01:54 AM     Lab Results   Component Value Date/Time    Glucose (POC) 148 (H) 05/18/2019 04:47 PM    Glucose (POC) 170 (H) 05/18/2019 12:37 PM    Glucose (POC) 114 (H) 05/17/2019 04:42 PM    Glucose (POC) 82 05/17/2019 12:11 PM    Glucose (POC) 88 05/17/2019 06:25 AM     Lab Results   Component Value Date/Time    Color YELLOW/STRAW 05/14/2019 08:14 PM    Appearance CLEAR 05/14/2019 08:14 PM    Specific gravity 1.016 05/14/2019 08:14 PM    pH (UA) 5.5 05/14/2019 08:14 PM    Protein NEGATIVE  05/14/2019 08:14 PM    Glucose NEGATIVE  05/14/2019 08:14 PM    Ketone 15 (A) 05/14/2019 08:14 PM    Bilirubin NEGATIVE  05/14/2019 08:14 PM    Urobilinogen 0.2 05/14/2019 08:14 PM    Nitrites NEGATIVE  05/14/2019 08:14 PM    Leukocyte Esterase NEGATIVE  05/14/2019 08:14 PM         Medications Reviewed:     Current Facility-Administered Medications   Medication Dose Route Frequency    bisacodyl (DULCOLAX) suppository 10 mg  10 mg Rectal DAILY PRN    lidocaine (XYLOCAINE) 2 % viscous solution 15 mL  15 mL Other PRN    polyethylene glycol (MIRALAX) packet 17 g  17 g Oral DAILY    docusate sodium (COLACE) capsule 100 mg  100 mg Oral BID    senna (SENOKOT) tablet 17.2 mg  2 Tab Oral DAILY    lidocaine (LIDODERM) 5 % patch 2 Patch  2 Patch TransDERmal Q24H    levETIRAcetam (KEPPRA) 500 mg in 0.9% sodium chloride 100 mL IVPB  500 mg IntraVENous Q12H    dexamethasone (DECADRON) 4 mg/mL injection 4 mg  4 mg IntraVENous Q12H    meclizine (ANTIVERT) tablet 12.5 mg  12.5 mg Oral Q6H PRN    HYDROmorphone (PF) (DILAUDID) injection 0.5 mg  0.5 mg IntraVENous Q3H PRN    sodium chloride (NS) flush 5-40 mL  5-40 mL IntraVENous Q8H    sodium chloride (NS) flush 5-40 mL  5-40 mL IntraVENous PRN    naloxone (NARCAN) injection 0.4 mg  0.4 mg IntraVENous PRN    acetaminophen (TYLENOL) tablet 650 mg  650 mg Oral Q6H    oxyCODONE IR (ROXICODONE) tablet 5 mg  5 mg Oral Q3H PRN    HYDROmorphone (DILAUDID) tablet 4 mg  4 mg Oral Q3H PRN    acetaminophen (TYLENOL) suppository 650 mg  650 mg Rectal P0O PRN    folic acid (FOLVITE) tablet 1 mg  1 mg Oral DAILY    thiamine HCL (B-1) tablet 100 mg  100 mg Oral DAILY    pantothenic ac-min oil-pet,hyd (AQUAPHOR) 41 % ointment   Topical PRN    pantoprazole (PROTONIX) tablet 40 mg  40 mg Oral DAILY    hydroCHLOROthiazide (HYDRODIURIL) tablet 25 mg  25 mg Oral DAILY    hydrALAZINE (APRESOLINE) 20 mg/mL injection 10 mg  10 mg IntraVENous Q4H PRN    metoprolol (LOPRESSOR) injection 2.5 mg  2.5 mg IntraVENous Q6H PRN    sodium chloride (NS) flush 5-40 mL  5-40 mL IntraVENous Q8H    sodium chloride (NS) flush 5-40 mL  5-40 mL IntraVENous PRN    ondansetron (ZOFRAN) injection 4 mg  4 mg IntraVENous Q4H PRN    naloxone (NARCAN) injection 0.4 mg  0.4 mg IntraVENous PRN    bisacodyl (DULCOLAX) tablet 5 mg  5 mg Oral DAILY PRN    acetaminophen (TYLENOL) tablet 650 mg  650 mg Oral Q4H PRN    prochlorperazine (COMPAZINE) tablet 10 mg  10 mg Oral Q6H PRN    phenol throat spray (CHLORASEPTIC) 1 Spray  1 Spray Oral PRN     ______________________________________________________________________  EXPECTED LENGTH OF STAY: 4d 21h  ACTUAL LENGTH OF STAY:          8                 Fred Dickens MD

## 2019-05-23 NOTE — PROGRESS NOTES
Patient has been accepted for inpatient rehab at [facility] and now waiting for authorization from [facility]. Physician/NP will do a peer to peer if patient is denied inpatient rehab. Wife, Joey Jovel [phone], did not want to discuss alternative for inpatient rehab today. UPDATE 4:10 pm     CM received call from [facility] with [facility] informing CM that the facility [facility] accept patient with Dophoff.

## 2019-05-23 NOTE — PROGRESS NOTES
Problem: Dysphagia (Adult)  Goal: *Acute Goals and Plan of Care (Insert Text)  Description  Speech pathology goals  Initiated 5/16/2019; re-evaluation 5/23/2019   1. Patient will participate in re-evaluation of swallow function within 7 days. Continue 5/23/2019    Outcome: Progressing Towards Goal     SPEECH LANGUAGE PATHOLOGY DYSPHAGIA TREATMENT  Patient: Sharla Rodas (80 y.o. male)  Date: 5/23/2019  Diagnosis: Alcohol intoxication ([facility] [address] [F10.929] Alcohol intoxication delirium ([facility] [address]  Procedure(s) (LRB):  C3-4 ANTERIOR CERVICAL DISCECTOMY FUSION (N/A) 2 Days Post-Op  Precautions: swallow Fall, Spinal    ASSESSMENT:  Patient with no improvement in swallow function, with continued severe pharyngeal dysphagia characterized by decreased laryngeal elevation, multiple swallows suggestive of pharyngeal residue, and throat clearing with wet vocal quality. Note ENT completed laryngoscopy prior to SLP evaluation. Discussed with hospitalist who reported patient with pharyngeal edema and R vocal cord paralysis. Discussed ENT results with patient and wife at bedside and how this impacts swallow function. Educated that patient's dysphagia is likely related to edema and should improve as edema subsides. Unfortunately, there is no active therapy to complete at this time to improve swallow function. Discussed plan for MBS early next week pending patient's progress, after which we may be able to complete swallowing exercises. Patient and wife verbalized understanding to all education provided. Progression toward goals:  ?         Improving appropriately and progressing toward goals  ? Improving slowly and progressing toward goals  ?          Not making progress toward goals and plan of care will be adjusted     PLAN:  Recommendations and Planned Interventions:  --Continue strict NPO  --SLP will continue to follow for re-evaluation of swallow function, with possible MBS early next week pending progress  Patient continues to benefit from skilled intervention to address the above impairments. Continue treatment per established plan of care. Discharge Recommendations:  Inpatient Rehab     SUBJECTIVE:   Patient agreeable to ice chip trials. OBJECTIVE:   Cognitive and Communication Status:  Neurologic State: Alert, Appropriate for age  Orientation Level: Oriented to person  Cognition: Follows commands  Perception: Appears intact  Perseveration: No perseveration noted  Safety/Judgement: Decreased insight into deficits, Decreased awareness of need for safety  Dysphagia Treatment:     P.O. Trials:  Patient Position: upright in bed  Vocal quality prior to P.O.: Hoarse  Consistency Presented: Ice chips  How Presented: SLP-fed/presented;Spoon     Bolus Acceptance: No impairment  Bolus Formation/Control: No impairment     Propulsion: No impairment  Oral Residue: None  Initiation of Swallow: Delayed (# of seconds)  Laryngeal Elevation: Decreased  Aspiration Signs/Symptoms: Change vocal quality  Pharyngeal Phase Characteristics: Double swallow; Suspected pharyngeal residue  Effective Modifications: None  Cues for Modifications: None          Pharyngeal Phase Severity : Severe              Pain:  Pain Scale 1: Numeric (0 - 10)  Pain Intensity 1: 5  Pain Location 1: Shoulder  After treatment:   ?              Patient left in no apparent distress sitting up in chair  ? Patient left in no apparent distress in bed  ? Call bell left within reach  ? Nursing notified  ? Caregiver present  ? Bed alarm activated    COMMUNICATION/EDUCATION:   Patient was educated regarding His deficit(s) of dysphagia as this relates to His diagnosis of ACDF. He demonstrated Good understanding as evidenced by verbalizing understanding.     The patient?s plan of care including recommendations, planned interventions, and recommended diet changes were discussed with: Ronal Nurse and Physician    Edith Leventhal, SLP  Time Calculation: 10 mins

## 2019-05-23 NOTE — PROGRESS NOTES
Neurosurgery Spine Progress Note  Jaylon Ornelas, AGACNP-BC  Work Cell: [phone]    Post Op Day: 2 Day Post-Op    May 23, 2019 1:17 PM     Helder Valencia    HPI:      Helder Valencia is a 78 y.o. male with history of Meniere's disease, hypertension and GERD who was found down in his backyard unresponsive with foam at the mouth. EMS was called and patient was brought to [facility] ED and he was found to have an alcohol level of 162. Neurology was consulted for possible seizure and he was started on Keppra. After recovery from his intoxication and lethargy his family noticed he had weakness in his arms. It was also noted that he had urinary retention and difficulty swallowing. MRI of the cervical spine was completed which showed disc herniation at C3-C4 level with some edema in the central cord. The neurosurgery service was consulted for evaluation. Subjective      Patient concerned regarding his swallowing difficulty. Requesting an ENT evaluation. Pain is managed. Feels a little stronger in arms postoperatively. States he can't feel where his hands are and needs to look at them to know their position. He denies headache, dizziness, chest pain, sob, abdominal pain, fever, chills, or nausea/vomiting. Objective:     Physical Exam:  General:  Alert and oriented. No acute distress. Respiratory:  Breathing unlabored. Equal chest expansion   Abdomen:   CV: Soft, non-tender, non-distended   No edema appreciated in the extremities   Neurologic:        Musculoskeletal:  Speech fluent. No facial droop. Follows commands. BUENO. Dense numbness in hands with impaired proprioception. Motor: deltoids 4/5, biceps 3/5, triceps 3/5, 1-2/5 in his  strength. Lower extremities approximately 4+/5. - nair. + babinski. Calves soft, nontender upon palpation and with passive stretch. Moves both upper and lower extremities. Incision: clean, dry, and intact. No significant erythema or swelling.   No active drainage noted. Vital Signs:    Patient Vitals for the past 8 hrs:   BP Temp Pulse Resp SpO2   19 0304 159/79 97.4 °F (36.3 °C) 62 22 97 %     Temp (24hrs), Av.9 °F (36.6 °C), Min:97.4 °F (36.3 °C), Max:98.5 °F (36.9 °C)      Intake/Output:  No intake/output data recorded.  1901 -  0700  In: -   Out: 3025 [Urine:3025]    Pain Control:   Pain Assessment  Pain Scale 1: Numeric (0 - 10)  Pain Intensity 1: 7  Pain Onset 1: postop  Pain Location 1: Shoulder  Pain Orientation 1: Right, Left  Pain Description 1: Aching  Pain Intervention(s) 1: Medication (see MAR)    LAB:    Recent Labs     19  0343   HCT 41.9   HGB 13.8     Lab Results   Component Value Date/Time    Sodium 138 2019 03:43 AM    Potassium 4.2 2019 03:43 AM    Chloride 106 2019 03:43 AM    CO2 25 2019 03:43 AM    Glucose 112 (H) 2019 03:43 AM    BUN 22 (H) 2019 03:43 AM    Creatinine 0.72 2019 03:43 AM    Calcium 7.8 (L) 2019 03:43 AM       PT/OT:   Gait:  Gait  Base of Support: Narrowed  Speed/Olive: Shuffled, Pace decreased (<100 feet/min)  Gait Abnormalities: Shuffling gait, Trunk sway increased  Ambulation - Level of Assistance: Moderate assistance, Assist x2  Distance (ft): 50 Feet (ft)  Assistive Device: Gait belt(Swedish walker)                   Assessment/Plan      1. Central Cord Syndrome with spinal cord signal change at C3-4 after GLF   -MRI revealed C3-4 herniated disc with cord edema and cervical stenosis   -POD#2 C3-4 ANTERIOR CERVICAL DISCECTOMY FUSION with Dr. Tahmina Moore   -Continue PT/OT. Patient would greatly benefit from inpatient rehab to regain his strength and function following a cervical spinal cord injury   -Physiatry consult   -Pain control- PRN oxycodone, tylenol   -wean decadron   -stop IV toradol   -Incentive Spirometer   -Tolerating diet   -VTE Prophylaxes - TEDS & SCDs    2.  Neurogenic bladder   -due to #1   -anderson in place for 5 days. Consider voiding trial today to see if bladder function has returned     3. Alcohol abuse   -counseling as able   -thiamine, folic acid    4. Dysphagia   -SLP consulted- patient with severe pharyngeal dysphagia. Recommendations to maintain NPO status.   - ENT consulted by primary team    5. Possible seizure   -? Etoh induced. Brain MRI changes seen in the left medial hippocampus- needs repeat imaging in 1 month with neuro   -tolerating low dose Keppra. Neuro recommends continuing on discharge    6. Hypertension   -BP poorly controlled- likely pain contributing and he is off his oral BP meds while NPO   -manage pain, prn hydralazine    7. Menieres disease   -patient on hctz to help with sodium diuresis. Can consider meclizine if he becomes symptomatic with dizziness    8. Possible CVA   -MRA head/neck revealed moderate left P2 stenosis without occlusion.   -discussed with hospitalist- ok to start ASA on POD#3 (Friday)    9.  Constipation   -bisacodyl suppository    Plan above discussed with Dr. Alex De Luna, Dr. Jovani Baltazar, and bedside nurse    Discharge To:  Rehab    Signed By: Yoly Dowd NP

## 2019-05-23 NOTE — PROGRESS NOTES
POD 2  Failed swallow eval  afeb, episodes of elevated BP  Alert  Antigravity in proximal UE (3/5), 2/5 hands  Paresthesias in LE  Clonus bilateral right>left  S/P: ACDF 3/4 for decompression, spinal cord injury (central cord syndrome)  Will require inpatient spinal cord rehab  Will wean steroids  NPO for now, SLP will reassess tomorrow  Meniere's disease - will follow sodium and consider adding meclizine
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
## 2019-05-23 NOTE — PROGRESS NOTES
Problem: Self Care Deficits Care Plan (Adult)  Goal: *Acute Goals and Plan of Care (Insert Text)  Description  Occupational Therapy Goals  Initiated 5/22/2019  1. Patient will perform upper body dressing with supervision/set-up within 7 day(s) using compensatory strategies PRN. 2.  Patient will perform lower body dressing with minimal assistance/contact guard assist within 7 day(s) using compensatory strategies PRN. 3.  Patient will perform grooming with minimal assistance/contact guard assist within 7 day(s) using compensatory strategies PRN. 4.  Patient will perform toilet transfers with minimal assistance/contact guard assist within 7 day(s). 5.  Patient will perform all aspects of toileting with minimal assistance/contact guard assist within 7 day(s). 6.  Patient will demonstrate ability to perform self-ROM for R and L digit/wrist extension with minimal assistance within 7 day(s). Outcome: Progressing Towards Goal   OCCUPATIONAL THERAPY TREATMENT  Patient: Chantal Monk (33 y.o. male)  Date: 5/23/2019  Diagnosis: Alcohol intoxication ([facility] [address] [F10.929] Alcohol intoxication delirium ([facility] [address]  Procedure(s) (LRB):  C3-4 ANTERIOR CERVICAL DISCECTOMY FUSION (N/A) 2 Days Post-Op  Precautions: Fall, Spinal  Chart, occupational therapy assessment, plan of care, and goals were reviewed. ASSESSMENT:  Patient requires largely MIN A for functional mobility at this time however continues to require MOD A-MAX A for ADL tasks secondary to decreased BUE ROM dexterity and ROM. Patient recalling 1/3 cervical precautions today with OT reviewing all and applying to functional tasks. OT then facilitated BUE ROM and strengthening seated EOB. Patient is motivated to regain functional independence and can tolerate 3 hours of therapy a day. Continue to recommend IP rehab at discharge to maximize patient safety and independence with ADL transfers and tasks.    Progression toward goals:  ?       Improving appropriately and progressing toward goals  ? Improving slowly and progressing toward goals  ? Not making progress toward goals and plan of care will be adjusted     PLAN:  Patient continues to benefit from skilled intervention to address the above impairments. Continue treatment per established plan of care. Discharge Recommendations:  Inpatient Rehab  Further Equipment Recommendations for Discharge:  TBD      SUBJECTIVE:   Patient stated ? I have been working on my hands. ?    OBJECTIVE DATA SUMMARY:   Cognitive/Behavioral Status:  Neurologic State: Alert  Orientation Level: Oriented to person;Oriented to place  Cognition: Follows commands  Perception: Appears intact  Perseveration: No perseveration noted  Safety/Judgement: Insight into deficits    Functional Mobility and Transfers for ADLs:  Bed Mobility:  Supine to Sit: Minimum assistance  Sit to Supine: Minimum assistance    Transfers:  Sit to Stand: Minimum assistance          Balance:  Sitting: Intact  Standing: Impaired  Standing - Static: Fair  Standing - Dynamic : Poor    ADL Intervention:     OT facilitated functional mobility to EOB for table-top activities to include digit extension, wrist extension, and shoulder flexion. OT facilitated theraputty exercises to promote increased FM coordination and dexterity. OT also facilitated WB exercises seated EOB. Cognitive Retraining  Safety/Judgement: Insight into deficits    Pain:  Pain Scale 1: Numeric (0 - 10)  Pain Intensity 1: 7  Pain Location 1: Shoulder  Pain Orientation 1: Right; Lower  Pain Description 1: Aching  Pain Intervention(s) 1: Medication (see MAR)  Activity Tolerance:   VSS  Please refer to the flowsheet for vital signs taken during this treatment. After treatment:   ? Patient left in no apparent distress sitting up in chair  ? Patient left in no apparent distress in bed  ? Call bell left within reach  ? Nursing notified  ? Caregiver present  ?  Bed alarm activated    COMMUNICATION/COLLABORATION:   The patient?s plan of care was discussed with: Physical Therapist and Registered Nurse    Julianna Acosta  Time Calculation: 38 mins
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
## 2019-05-23 NOTE — PROGRESS NOTES
Problem: Mobility Impaired (Adult and Pediatric)  Goal: *Acute Goals and Plan of Care (Insert Text)  Description  Physical Therapy Goals  Initiated 5/18/2019  1. Patient will move from supine to sit and sit to supine , scoot up and down and roll side to side in bed with minimal assistance/contact guard assist within 7 day(s). 2.  Patient will transfer from bed to chair and chair to bed with minimal assistance/contact guard assist using the least restrictive device within 7 day(s). 3.  Patient will perform sit to stand with minimal assistance/contact guard assist within 7 day(s). 4.  Patient will ambulate with minimal assistance/contact guard assist for 150 feet with the least restrictive device within 7 day(s). 5.  Patient will participate in a strengthening program and be independent within 7 days. 5/23/2019 1527 by Aleksandar Ordonez PT  Outcome: Progressing Towards Goal   PHYSICAL THERAPY TREATMENT  Patient: Alethea Lipoma (11 y.o. male)  Date: 5/23/2019  Diagnosis: Alcohol intoxication ([facility] [address] [F10.929] Alcohol intoxication delirium ([facility] [address]  Procedure(s) (LRB):  C3-4 ANTERIOR CERVICAL DISCECTOMY FUSION (N/A) 2 Days Post-Op  Precautions: Fall, Spinal  Chart, physical therapy assessment, plan of care and goals were reviewed. ASSESSMENT:  Pt received supine in bed and eager to work with therapy. Reviewed UE/hand exercises from white board with good recall. Continues to be most limited by decreased UE strength/coordination/proprioception. Pt very attentive and motivated to compete all tasks. Came to sitting EOB with Min A and good balance displayed. Scooting towards EOB with Min A and multiple trials with assist to place hands in proper position. Stood to [place] walker with Min A x 2. Pt tolerated 3 gait trials x 50-60 feet with seated rest breaks between. Required constant assist to guide RUE on platform as it tended to slide over the edge. Occasional scissoring and guiding walker away from ADI. Tolerated gait well and vitals all WNL. Left up in chair after performing LE exercises. Wife in room and will encourage pt to complete exercises throughout the day. Plan for dobhoff placement this afternoon. Progression toward goals:  ?    Improving appropriately and progressing toward goals  ? Improving slowly and progressing toward goals  ? Not making progress toward goals and plan of care will be adjusted     PLAN:  Patient continues to benefit from skilled intervention to address the above impairments. Continue treatment per established plan of care. Discharge Recommendations:  Inpatient Rehab  Further Equipment Recommendations for Discharge:  TBD at rehab      SUBJECTIVE:   Patient stated You are funny today.     OBJECTIVE DATA SUMMARY:   Critical Behavior:  Neurologic State: Alert, Appropriate for age  Orientation Level: Oriented to person  Cognition: Follows commands  Safety/Judgement: Decreased insight into deficits, Decreased awareness of need for safety  Functional Mobility Training:  Bed Mobility:     Supine to Sit: Minimum assistance              Transfers:  Sit to Stand: Minimum assistance  Stand to Sit: Minimum assistance                             Balance:  Sitting: Intact  Standing: Impaired  Standing - Static: Fair  Standing - Dynamic : Poor  Ambulation/Gait Training:  Distance (ft): 60 Feet (ft)(60ft x 3)  Assistive Device: Gait belt(swedish walker)  Ambulation - Level of Assistance: Minimal assistance;Assist x2        Gait Abnormalities: Decreased step clearance;Shuffling gait        Base of Support: Narrowed(feet ER)     Speed/Olive: Pace decreased (<100 feet/min); Shuffled                       Stairs:              Neuro Re-Education:    Therapeutic Exercises:   PROM to full wrist extension/flexion, active resistance of wrist flexion, Shoulder flexion+abduction/adduction, LAQ, calf raises  Pain:  Pain Scale 1: Numeric (0 - 10)  Pain Intensity 1: 7  Pain Location 1: Shoulder  Pain Orientation 1: Right; Lower  Pain Description 1: Aching  Pain Intervention(s) 1: Medication (see MAR)  Activity Tolerance:   Good  Please refer to the flowsheet for vital signs taken during this treatment. After treatment:   ?    Patient left in no apparent distress sitting up in chair  ? Patient left in no apparent distress in bed  ? Call bell left within reach  ? Nursing notified  ? Caregiver present  ?     Bed alarm activated    COMMUNICATION/COLLABORATION:   The patients plan of care was discussed with: Registered Nurse    Hugo Arreola PT   Time Calculation: 40 mins

## 2019-05-24 LAB
ANION GAP SERPL CALC-SCNC: 6 MMOL/L (ref 5–15)
BASOPHILS # BLD: 0 K/UL (ref 0–0.1)
BASOPHILS NFR BLD: 0 % (ref 0–1)
BUN SERPL-MCNC: 20 MG/DL (ref 6–20)
BUN/CREAT SERPL: 30 (ref 12–20)
CALCIUM SERPL-MCNC: 8.3 MG/DL (ref 8.5–10.1)
CHLORIDE SERPL-SCNC: 102 MMOL/L (ref 97–108)
CO2 SERPL-SCNC: 27 MMOL/L (ref 21–32)
CREAT SERPL-MCNC: 0.67 MG/DL (ref 0.7–1.3)
DIFFERENTIAL METHOD BLD: ABNORMAL
EOSINOPHIL # BLD: 0 K/UL (ref 0–0.4)
EOSINOPHIL NFR BLD: 0 % (ref 0–7)
ERYTHROCYTE [DISTWIDTH] IN BLOOD BY AUTOMATED COUNT: 14 % (ref 11.5–14.5)
GLUCOSE SERPL-MCNC: 154 MG/DL (ref 65–100)
HCT VFR BLD AUTO: 46.6 % (ref 36.6–50.3)
HGB BLD-MCNC: 15.1 G/DL (ref 12.1–17)
IMM GRANULOCYTES # BLD AUTO: 0.1 K/UL (ref 0–0.04)
IMM GRANULOCYTES NFR BLD AUTO: 1 % (ref 0–0.5)
LYMPHOCYTES # BLD: 0.7 K/UL (ref 0.8–3.5)
LYMPHOCYTES NFR BLD: 7 % (ref 12–49)
MAGNESIUM SERPL-MCNC: 2.4 MG/DL (ref 1.6–2.4)
MCH RBC QN AUTO: 28.8 PG (ref 26–34)
MCHC RBC AUTO-ENTMCNC: 32.4 G/DL (ref 30–36.5)
MCV RBC AUTO: 88.9 FL (ref 80–99)
MONOCYTES # BLD: 0.7 K/UL (ref 0–1)
MONOCYTES NFR BLD: 7 % (ref 5–13)
NEUTS SEG # BLD: 7.8 K/UL (ref 1.8–8)
NEUTS SEG NFR BLD: 85 % (ref 32–75)
NRBC # BLD: 0 K/UL (ref 0–0.01)
NRBC BLD-RTO: 0 PER 100 WBC
PHOSPHATE SERPL-MCNC: 3.1 MG/DL (ref 2.6–4.7)
PLATELET # BLD AUTO: 220 K/UL (ref 150–400)
PMV BLD AUTO: 9.9 FL (ref 8.9–12.9)
POTASSIUM SERPL-SCNC: 4.1 MMOL/L (ref 3.5–5.1)
RBC # BLD AUTO: 5.24 M/UL (ref 4.1–5.7)
RBC MORPH BLD: ABNORMAL
RBC MORPH BLD: ABNORMAL
SODIUM SERPL-SCNC: 135 MMOL/L (ref 136–145)
WBC # BLD AUTO: 9.3 K/UL (ref 4.1–11.1)

## 2019-05-24 PROCEDURE — 80048 BASIC METABOLIC PNL TOTAL CA: CPT

## 2019-05-24 PROCEDURE — 84100 ASSAY OF PHOSPHORUS: CPT

## 2019-05-24 PROCEDURE — 74011250636 HC RX REV CODE- 250/636: Performed by: HOSPITALIST

## 2019-05-24 PROCEDURE — 74011250637 HC RX REV CODE- 250/637: Performed by: INTERNAL MEDICINE

## 2019-05-24 PROCEDURE — 92526 ORAL FUNCTION THERAPY: CPT | Performed by: SPEECH-LANGUAGE PATHOLOGIST

## 2019-05-24 PROCEDURE — 51798 US URINE CAPACITY MEASURE: CPT

## 2019-05-24 PROCEDURE — 97530 THERAPEUTIC ACTIVITIES: CPT

## 2019-05-24 PROCEDURE — 65270000029 HC RM PRIVATE

## 2019-05-24 PROCEDURE — 83735 ASSAY OF MAGNESIUM: CPT

## 2019-05-24 PROCEDURE — 36415 COLL VENOUS BLD VENIPUNCTURE: CPT

## 2019-05-24 PROCEDURE — 77030034850

## 2019-05-24 PROCEDURE — 74011000250 HC RX REV CODE- 250: Performed by: INTERNAL MEDICINE

## 2019-05-24 PROCEDURE — 74011250636 HC RX REV CODE- 250/636: Performed by: NURSE PRACTITIONER

## 2019-05-24 PROCEDURE — 97110 THERAPEUTIC EXERCISES: CPT

## 2019-05-24 PROCEDURE — 97116 GAIT TRAINING THERAPY: CPT

## 2019-05-24 PROCEDURE — 74011000258 HC RX REV CODE- 258: Performed by: HOSPITALIST

## 2019-05-24 PROCEDURE — 94760 N-INVAS EAR/PLS OXIMETRY 1: CPT

## 2019-05-24 PROCEDURE — 85025 COMPLETE CBC W/AUTO DIFF WBC: CPT

## 2019-05-24 RX ORDER — TAMSULOSIN HYDROCHLORIDE 0.4 MG/1
0.4 CAPSULE ORAL DAILY
Status: DISCONTINUED | OUTPATIENT
Start: 2019-05-24 | End: 2019-06-01 | Stop reason: HOSPADM

## 2019-05-24 RX ORDER — GUAIFENESIN 100 MG/5ML
81 LIQUID (ML) ORAL DAILY
Status: DISCONTINUED | OUTPATIENT
Start: 2019-05-24 | End: 2019-06-01 | Stop reason: HOSPADM

## 2019-05-24 RX ADMIN — SODIUM CHLORIDE 500 MG: 900 INJECTION, SOLUTION INTRAVENOUS at 16:56

## 2019-05-24 RX ADMIN — PANTOPRAZOLE SODIUM 40 MG: 40 TABLET, DELAYED RELEASE ORAL at 11:23

## 2019-05-24 RX ADMIN — DEXAMETHASONE SODIUM PHOSPHATE 4 MG: 4 INJECTION, SOLUTION INTRAMUSCULAR; INTRAVENOUS at 20:48

## 2019-05-24 RX ADMIN — Medication 10 ML: at 14:49

## 2019-05-24 RX ADMIN — Medication 10 ML: at 20:49

## 2019-05-24 RX ADMIN — HYDROMORPHONE HYDROCHLORIDE 0.5 MG: 1 INJECTION, SOLUTION INTRAMUSCULAR; INTRAVENOUS; SUBCUTANEOUS at 23:30

## 2019-05-24 RX ADMIN — Medication 10 ML: at 06:04

## 2019-05-24 RX ADMIN — HYDROMORPHONE HYDROCHLORIDE 0.5 MG: 1 INJECTION, SOLUTION INTRAMUSCULAR; INTRAVENOUS; SUBCUTANEOUS at 18:21

## 2019-05-24 RX ADMIN — Medication 10 ML: at 14:48

## 2019-05-24 RX ADMIN — DEXAMETHASONE SODIUM PHOSPHATE 4 MG: 4 INJECTION, SOLUTION INTRAMUSCULAR; INTRAVENOUS at 09:29

## 2019-05-24 RX ADMIN — SODIUM CHLORIDE 500 MG: 900 INJECTION, SOLUTION INTRAVENOUS at 05:05

## 2019-05-24 RX ADMIN — Medication 25 MG: at 11:23

## 2019-05-24 RX ADMIN — HYDROMORPHONE HYDROCHLORIDE 0.5 MG: 1 INJECTION, SOLUTION INTRAMUSCULAR; INTRAVENOUS; SUBCUTANEOUS at 00:35

## 2019-05-24 RX ADMIN — HYDROMORPHONE HYDROCHLORIDE 0.5 MG: 1 INJECTION, SOLUTION INTRAMUSCULAR; INTRAVENOUS; SUBCUTANEOUS at 09:29

## 2019-05-24 RX ADMIN — HYDROMORPHONE HYDROCHLORIDE 0.5 MG: 1 INJECTION, SOLUTION INTRAMUSCULAR; INTRAVENOUS; SUBCUTANEOUS at 14:48

## 2019-05-24 NOTE — PROGRESS NOTES
PHILIP follow up. PHILIP spoke with [facility] Liaison, who confirmed that patient can not be accepted with a Dobhoff. However, she said plan now is for Peg Tube insertion Tuesday 5/ 28/19. If Peg Tube is successful, then [facility] will be able to accept patient.

## 2019-05-24 NOTE — PROGRESS NOTES
Spiritual Care Assessment/Progress Note  [facility]      NAME: Sharla Rodas      MRN: [id number]  AGE: 78 y.o. SEX: male  Congregational Affiliation: Shinto   Language: English     5/24/2019     Total Time (in minutes): 5     Spiritual Assessment begun in [facility] 6W1 ORTHO SPINE through conversation with:         []Patient        [] Family    [] Friend(s)        Reason for Consult: Initial/Spiritual assessment, patient floor     Spiritual beliefs: (Please include comment if needed)     [] Identifies with a alexandra tradition:         [] Supported by a alexandra community:            [] Claims no spiritual orientation:           [] Seeking spiritual identity:                [] Adheres to an individual form of spirituality:           [x] Not able to assess:    Patient                       Identified resources for coping:      [] Prayer                               [] Music                  [] Guided Imagery     [] Family/friends                 [] Pet visits     [] Devotional reading                         [x] Unknown     [] Other:                                            Interventions offered during this visit: (See comments for more details)    Patient Interventions: Other (comment)(Pastoral Care Note)           Plan of Care:     [] Support spiritual and/or cultural needs    [] Support AMD and/or advance care planning process      [] Support grieving process   [] Coordinate Rites and/or Rituals    [] Coordination with community clergy   [] No spiritual needs identified at this time   [] Detailed Plan of Care below (See Comments)  [] Make referral to Music Therapy  [] Make referral to Pet Therapy     [] Make referral to Addiction services  [] Make referral to [facility]  [] Make referral to Spiritual Care Partner  [] No future visits requested        [x] Follow up visits as needed     Comments: Attempted Initial Spiritual Assessment in Ortho Spine. Unable to assess patients needs.   No family present at this time. Left Pastoral Care note. Chaplains will follow as able and/or as needed. [address] Paging Service [phone])

## 2019-05-24 NOTE — PROGRESS NOTES
Problem: Mobility Impaired (Adult and Pediatric)  Goal: *Acute Goals and Plan of Care (Insert Text)  Description  Physical Therapy Goals  Initiated 5/18/2019  1. Patient will move from supine to sit and sit to supine , scoot up and down and roll side to side in bed with minimal assistance/contact guard assist within 7 day(s). 2.  Patient will transfer from bed to chair and chair to bed with minimal assistance/contact guard assist using the least restrictive device within 7 day(s). 3.  Patient will perform sit to stand with minimal assistance/contact guard assist within 7 day(s). 4.  Patient will ambulate with minimal assistance/contact guard assist for 150 feet with the least restrictive device within 7 day(s). 5.  Patient will participate in a strengthening program and be independent within 7 days. 5/24/2019 1617 by Saira Mandel PT  Outcome: Progressing Towards Goal   PHYSICAL THERAPY TREATMENT  Patient: Marcello Rothman (58 y.o. male)  Date: 5/24/2019  Diagnosis: Alcohol intoxication ([facility] [address] [F10.929] Alcohol intoxication delirium ([facility] [address]  Procedure(s) (LRB):  C3-4 ANTERIOR CERVICAL DISCECTOMY FUSION (N/A) 3 Days Post-Op  Precautions: Fall, Spinal  Chart, physical therapy assessment, plan of care and goals were reviewed. ASSESSMENT:  Pt received supine in bed with daughter present. Eager to walk the hallways. Opted to not use walker in order to challenge balance. With A x 2 at gait belt for safety pt ambulated an increased distance with only Min A to occasionally stabilize at pelvis and shoulders to maintain upright posture and avoid posterior LOB. Two small LOB with turning in hallway. Absent arm swing noted. Up in chair at end of session with putty working on finger and wrist extension. Pt makes a great IP rehab candidate d/t high fall risk, impaired balance, decreased coordination and significantly impaired UE strength and sensation.   Progression toward goals:  ?    Improving appropriately and progressing toward goals  ? Improving slowly and progressing toward goals  ? Not making progress toward goals and plan of care will be adjusted     PLAN:  Patient continues to benefit from skilled intervention to address the above impairments. Continue treatment per established plan of care. Discharge Recommendations:  Inpatient Rehab  Further Equipment Recommendations for Discharge:  TBD at rehab      SUBJECTIVE:   Patient stated ? I guess I will get up if it is just my last chance for the day. ?    OBJECTIVE DATA SUMMARY:   Critical Behavior:  Neurologic State: Alert  Orientation Level: Oriented X4  Cognition: Follows commands  Safety/Judgement: Insight into deficits  Functional Mobility Training:  Bed Mobility:     Supine to Sit: Stand-by assistance              Transfers:  Sit to Stand: Minimum assistance  Stand to Sit: Minimum assistance(uncontrolled descent)                             Balance:  Sitting: Intact  Sitting - Static: Fair (occasional)  Standing: Impaired  Standing - Static: Fair  Standing - Dynamic : Fair  Ambulation/Gait Training:  Distance (ft): 75 Feet (ft)(x 3)  Assistive Device: Gait belt  Ambulation - Level of Assistance: Minimal assistance        Gait Abnormalities: Decreased step clearance; Altered arm swing        Base of Support: Narrowed; Center of gravity altered     Speed/Olive: Fluctuations                         Activity Tolerance:   Good  Please refer to the flowsheet for vital signs taken during this treatment. After treatment:   ?    Patient left in no apparent distress sitting up in chair  ? Patient left in no apparent distress in bed  ? Call bell left within reach  ? Nursing notified  ? Caregiver present  ?     Bed alarm activated    COMMUNICATION/COLLABORATION:   The patient?s plan of care was discussed with: Registered Nurse    Prosper Choe, PT   Time Calculation: 30 mins

## 2019-05-24 NOTE — PROGRESS NOTES
Problem: Mobility Impaired (Adult and Pediatric)  Goal: *Acute Goals and Plan of Care (Insert Text)  Description  Physical Therapy Goals  Initiated 5/18/2019  1. Patient will move from supine to sit and sit to supine , scoot up and down and roll side to side in bed with minimal assistance/contact guard assist within 7 day(s). 2.  Patient will transfer from bed to chair and chair to bed with minimal assistance/contact guard assist using the least restrictive device within 7 day(s). 3.  Patient will perform sit to stand with minimal assistance/contact guard assist within 7 day(s). 4.  Patient will ambulate with minimal assistance/contact guard assist for 150 feet with the least restrictive device within 7 day(s). 5.  Patient will participate in a strengthening program and be independent within 7 days. Outcome: Progressing Towards Goal     PHYSICAL THERAPY TREATMENT  Patient: Royal Polo (50 y.o. male)  Date: 5/24/2019  Diagnosis: Alcohol intoxication ([facility] [address] [F10.929] Alcohol intoxication delirium ([facility] [address]  Procedure(s) (LRB):  C3-4 ANTERIOR CERVICAL DISCECTOMY FUSION (N/A) 3 Days Post-Op  Precautions: Fall, Spinal  Chart, physical therapy assessment, plan of care and goals were reviewed. ASSESSMENT:  Pt was in bed when first entering the room. Initial difficulty with beginning to stand due to posterior loss of balance, it took several rocking attempts to get upright. Pt used a [place] walker and walked 60 feet prior to needing physical assist to maintain arms on platform and guidance for path deviations Improved  strength noted on handles. Following seated rest break, pt was able to get up and walk an additional 20 feet without the walker but required minimum assistance due to decreased coordination and balance impairment. Pt was left sitting in the chair with the table in front of him and given additional exercises to do to work on wrist extension and  strength.   Progression toward goals:  ?    Improving appropriately and progressing toward goals  ? Improving slowly and progressing toward goals  ? Not making progress toward goals and plan of care will be adjusted     PLAN:  Patient continues to benefit from skilled intervention to address the above impairments. Continue treatment per established plan of care. Discharge Recommendations:  Inpatient Rehab  Further Equipment Recommendations for Discharge: to be determined at rehab     SUBJECTIVE:   Patient stated ? You're going to let me sit down, how nice. ?    OBJECTIVE DATA SUMMARY:   Critical Behavior:  Neurologic State: Alert  Orientation Level: Oriented X4  Cognition: Appropriate for age attention/concentration  Safety/Judgement: Insight into deficits  Functional Mobility Training:  Bed Mobility:     Supine to Sit: Stand-by assistance              Transfers:  Sit to Stand: Minimum assistance  Stand to Sit: Minimum assistance                             Balance:  Sitting: Intact; With support  Sitting - Static: Fair (occasional)  Standing: Impaired  Standing - Static: Fair  Ambulation/Gait Training:  Distance (ft): 60 Feet (ft)(x4 with standing and sitting breaks)  Assistive Device: Gait belt([place] walker)  Ambulation - Level of Assistance: Minimal assistance        Gait Abnormalities: Decreased step clearance        Base of Support: Narrowed     Speed/Olive: Pace decreased (<100 feet/min)                                   Pain:  Pain Scale 1: Numeric (0 - 10)  Pain Intensity 1: 0  Pain Location 1: Shoulder  Pain Orientation 1: Left;Right  Pain Description 1: Aching  Pain Intervention(s) 1: Medication (see MAR)  Activity Tolerance:   Good  Please refer to the flowsheet for vital signs taken during this treatment. After treatment:   ?    Patient left in no apparent distress sitting up in chair  ? Patient left in no apparent distress in bed  ? Call bell left within reach  ? Nursing notified  ? Caregiver present  ?     Bed alarm activated    COMMUNICATION/COLLABORATION:   The patient?s plan of care was discussed with: Physical Therapist and Registered Nurse    Hugo Arreola, PT   Time Calculation: 25 mins

## 2019-05-24 NOTE — PROGRESS NOTES
Problem: Self Care Deficits Care Plan (Adult)  Goal: *Acute Goals and Plan of Care (Insert Text)  Description  Occupational Therapy Goals  Initiated 5/22/2019  1. Patient will perform upper body dressing with supervision/set-up within 7 day(s) using compensatory strategies PRN. 2.  Patient will perform lower body dressing with minimal assistance/contact guard assist within 7 day(s) using compensatory strategies PRN. 3.  Patient will perform grooming with minimal assistance/contact guard assist within 7 day(s) using compensatory strategies PRN. 4.  Patient will perform toilet transfers with minimal assistance/contact guard assist within 7 day(s). 5.  Patient will perform all aspects of toileting with minimal assistance/contact guard assist within 7 day(s). 6.  Patient will demonstrate ability to perform self-ROM for R and L digit/wrist extension with minimal assistance within 7 day(s). Outcome: Progressing Towards Goal   OCCUPATIONAL THERAPY TREATMENT  Patient: Sharla Rodas (83 y.o. male)  Date: 5/24/2019  Diagnosis: Alcohol intoxication ([facility] [address] [F10.929] Alcohol intoxication delirium ([facility] [address]  Procedure(s) (LRB):  C3-4 ANTERIOR CERVICAL DISCECTOMY FUSION (N/A) 3 Days Post-Op  Precautions: Fall, Spinal  Chart, occupational therapy assessment, plan of care, and goals were reviewed. ASSESSMENT:  Patient continues to make progress with OT and demonstrating increased AROM of BUE in shoulders. Patient performed bilateral FM coordination exercises with OT today seated in chair and continues to be motivated to participate in therapy sessions. Continue to recommend IP rehab to maximize patient safety and independence with ADL transfers and tasks. Next session:recommend practice with toilet transfers; recommend grooming tasks with built-up support (needs built-up handles); recommend continued FM exercises    Progression toward goals:  ?       Improving appropriately and progressing toward goals  ? Improving slowly and progressing toward goals  ? Not making progress toward goals and plan of care will be adjusted     PLAN:  Patient continues to benefit from skilled intervention to address the above impairments. Continue treatment per established plan of care. Discharge Recommendations:  Inpatient Rehab  Further Equipment Recommendations for Discharge:  TBD      SUBJECTIVE:   Patient stated ? I have been doing my exercises. .? OBJECTIVE DATA SUMMARY:   Cognitive/Behavioral Status:  Neurologic State: Alert  Orientation Level: Oriented X4  Cognition: Appropriate for age attention/concentration  Perception: Appears intact  Perseveration: No perseveration noted  Safety/Judgement: Insight into deficits      Balance:  Sitting: Intact; With support    ADL Intervention:     Patient completed 2 sets x 5 reps of BUE shoulder flexion to 90 degrees. Patient completed 2 set x 5 reps of AAROM wrist extension against table-top. Patient practicing  strength with hand over hand assist from OT. Patient practiced weight bearing through BUE to facilitate wrist extension/digit extension in preparation for ADL tasks. Patient's wife present in session and educated on exercises. Cognitive Retraining  Safety/Judgement: Insight into deficits    Pain:  Pain Scale 1: Numeric (0 - 10)  Pain Intensity 1: 6  Pain Location 1: Shoulder  Pain Orientation 1: Left;Right  Pain Description 1: Aching  Pain Intervention(s) 1: Medication (see MAR)  Activity Tolerance:   VSS  Please refer to the flowsheet for vital signs taken during this treatment. After treatment:   ? Patient left in no apparent distress sitting up in chair  ? Patient left in no apparent distress in bed  ? Call bell left within reach  ? Nursing notified  ? Caregiver present  ?  Bed alarm activated    COMMUNICATION/COLLABORATION:   The patient?s plan of care was discussed with: Physical Therapist and Registered Nurse    Nicholas Plata Calculation: 8 mins

## 2019-05-24 NOTE — PROGRESS NOTES
Hospitalist Progress Note  Tahira Garcia MD  Answering service: [phone] from in house phone  Cell: [phone]      Date of Service:  2019  NAME:  Hilton Alba  :  1950  MRN:  [id number]      Admission Summary:   78-year-old man with a past medical history significant for hypertension, gastroesophageal reflux disease, Meniere's disease, who was in his usual state of health until the day of his presentation at the emergency room when the patient developed a change in mental status. The spouse stated that she was out of the house and when she came back found the patient laying prone in the backyard unresponsive. It is not clear how long the patient had been lying prone in the backyard. When the EMS arrived at the scene, it was reported that the patient was verbally responsive to the EMS crew that he had too much alcohol. The patient was administering alcohol by rectal enema stating that he wanted to try something new. The patient has no history of alcohol abuse according to his spouse, but today the patient had too much alcohol than usual.  The patient was brought to the emergency room for further evaluation. When the patient arrived at the emergency room, the patient remained lethargic, but easily arousable and also with verbal response. CT scan of the head was obtained, this was negative. The patient's alcohol level was 162. The patient was referred to the Hospitalist Service for evaluation for admission. No record of prior admission to this hospital    Interval history / Subjective:   F/U after cord compression, AMS   Still has voice changes, speech to work with him soon on his swallowing. Hands slowly improving.      Assessment & Plan:     B/L upper extremity weakness and tingling in arms with neck and shoulder pain POA but  more evident after he woke up later in hospitalization  - secondary to cervical cord compression from C3/C4 disc herniation  -s/p 5/21 Anterior cervical diskectomy, decompression, fusion to C3-4  -On Decadron, wean per NSGY  -NSGY following  - PT/OT    Delirium and fall , resolved on 5/18  -possibly secondary to seizure, resolved. -MRI shows hippocampus restricted diffusion , likley suggestive of seizure like activity ?  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  -Also initially though delirium compounded by alcohol intoxication, level 162  -EEG , no seizure , started on keppra by neuro though for now   -Echo EF 61-65% with no RWMA, no PFO  -Continue on Keppra till seen by Dr Armand Davis  -The patient is back to baseline mental status  -Repeat MRI brain in 4 weeks, follow up outpatient with Dr. Armand Davis in 4 weeks. Dysphagia - secondary to edema, R vocal cord paralysis, spinal injury   - SLP following, if no improvement will need to discuss PEG (will readdress early next week)  - ENT following,   - Dophoff placement 02/23  - Will need to consult GI next week if PEG is warranted. Seizure like activity   -Started on keppra by neuro   -MRI shows hippocampus restricted diffusion , likley suggestive of seizure like activity     Leukocytosis and lactic acidosis on admission- reactive 2/2 seizure, resolved. HTN - On prn hydralazine, continue HCTZ, BP goal in hospital less than 160  Alcohol intoxication and now risk of withdrawal Resolved     Neurogenic bladder from above - anderson removed 5/23, tamsulosin started.      PT/OT     Code status:  Full   DVT prophylaxis: heparin s/q when cleared by Surgery    Care Plan discussed with: Patient/Family  Disposition: TBD     Hospital Problems  Date Reviewed: 5/20/2019          Codes Class Noted POA    Spinal cord injury at C1-C4 level [facility]) ICD-10-CM: S14.101A  ICD-9-CM: 952.00  5/22/2019 Unknown        * (Principal) Alcohol intoxication delirium ([facility] [address] ICD-10-CM: V36.782  ICD-9-CM: 291.0  5/15/2019 Yes        Alcohol intoxication ([facility] [address] ICD-10-CM: B14.678  ICD-9-CM: 305.00  5/15/2019 Unknown                Review of Systems:   A comprehensive review of systems was negative except for that written in the HPI. Vital Signs:    Last 24hrs VS reviewed since prior progress note. Most recent are:  Visit Vitals  /84 (BP 1 Location: Left arm, BP Patient Position: At rest)   Pulse 70   Temp 97.9 °F (36.6 °C)   Resp 16   Ht 5' 8\" (1.727 m)   Wt 75.7 kg (166 lb 14.2 oz)   SpO2 97%   BMI 25.38 kg/m²         Intake/Output Summary (Last 24 hours) at 5/24/2019 1649  Last data filed at 5/24/2019 2942  Gross per 24 hour   Intake 240 ml   Output 900 ml   Net -660 ml        Physical Examination:             Constitutional:   Awake and conversant, NAD   ENT:  Oral mucous moist, oropharynx benign. + C collar in place, NG in place   Resp:  CTA bilaterally. No wheezing/rhonchi/rales. No accessory muscle use   CV:  Regular rhythm, normal rate, no murmurs, gallops, rubs    GI:  Soft, non distended, non tender. normoactive bowel sounds, no hepatosplenomegaly     Musculoskeletal:  No edema, warm, 2+ pulses throughout    Neurologic:  upper extremity 3-4/5 b/l             Data Review: All imaging and laboratory data reviewed. Labs:     Recent Labs     05/24/19  0257 05/22/19  0343   WBC 9.3 10.5   HGB 15.1 13.8   HCT 46.6 41.9    173     Recent Labs     05/24/19  0257 05/22/19  0343   * 138   K 4.1 4.2    106   CO2 27 25   BUN 20 22*   CREA 0.67* 0.72   * 112*   CA 8.3* 7.8*   MG 2.4 2.5*   PHOS 3.1 4.4     Recent Labs     05/22/19  0343   SGOT 18   ALT 46   AP 57   TBILI 0.7   TP 5.1*   ALB 2.5*   GLOB 2.6     No results for input(s): INR, PTP, APTT in the last 72 hours. No lab exists for component: INREXT, INREXT   No results for input(s): FE, TIBC, PSAT, FERR in the last 72 hours. No results found for: FOL, RBCF   No results for input(s): PH, PCO2, PO2 in the last 72 hours.   No results for input(s): CPK, CKNDX, TROIQ in the last 72 hours.    No lab exists for component: CPKMB  Lab Results   Component Value Date/Time    Cholesterol, total 129 05/15/2019 01:54 AM    HDL Cholesterol 54 05/15/2019 01:54 AM    LDL, calculated 65 05/15/2019 01:54 AM    Triglyceride 50 05/15/2019 01:54 AM    CHOL/HDL Ratio 2.4 05/15/2019 01:54 AM     Lab Results   Component Value Date/Time    Glucose (POC) 148 (H) 05/18/2019 04:47 PM    Glucose (POC) 170 (H) 05/18/2019 12:37 PM    Glucose (POC) 114 (H) 05/17/2019 04:42 PM    Glucose (POC) 82 05/17/2019 12:11 PM    Glucose (POC) 88 05/17/2019 06:25 AM     Lab Results   Component Value Date/Time    Color YELLOW/STRAW 05/14/2019 08:14 PM    Appearance CLEAR 05/14/2019 08:14 PM    Specific gravity 1.016 05/14/2019 08:14 PM    pH (UA) 5.5 05/14/2019 08:14 PM    Protein NEGATIVE  05/14/2019 08:14 PM    Glucose NEGATIVE  05/14/2019 08:14 PM    Ketone 15 (A) 05/14/2019 08:14 PM    Bilirubin NEGATIVE  05/14/2019 08:14 PM    Urobilinogen 0.2 05/14/2019 08:14 PM    Nitrites NEGATIVE  05/14/2019 08:14 PM    Leukocyte Esterase NEGATIVE  05/14/2019 08:14 PM         Medications Reviewed:     Current Facility-Administered Medications   Medication Dose Route Frequency    aspirin chewable tablet 81 mg  81 mg Oral DAILY    bisacodyl (DULCOLAX) suppository 10 mg  10 mg Rectal DAILY PRN    lidocaine (XYLOCAINE) 2 % viscous solution 15 mL  15 mL Other PRN    polyethylene glycol (MIRALAX) packet 17 g  17 g Oral DAILY    docusate sodium (COLACE) capsule 100 mg  100 mg Oral BID    senna (SENOKOT) tablet 17.2 mg  2 Tab Oral DAILY    lidocaine (LIDODERM) 5 % patch 2 Patch  2 Patch TransDERmal Q24H    pantoprazole (PROTONIX) 2 mg/mL oral suspension 40 mg  40 mg Per NG tube DAILY    acetaminophen (TYLENOL) solution 650 mg  650 mg Per NG tube Q6H    hydroCHLOROthiazide (HYDRODIURIL) 5 mg/mL oral suspension (compound) 25 mg  25 mg Per NG tube DAILY    levETIRAcetam (KEPPRA) 500 mg in 0.9% sodium chloride 100 mL IVPB  500 mg IntraVENous Q12H    dexamethasone (DECADRON) 4 mg/mL injection 4 mg  4 mg IntraVENous Q12H    meclizine (ANTIVERT) tablet 12.5 mg  12.5 mg Oral Q6H PRN    HYDROmorphone (PF) (DILAUDID) injection 0.5 mg  0.5 mg IntraVENous Q3H PRN    sodium chloride (NS) flush 5-40 mL  5-40 mL IntraVENous Q8H    sodium chloride (NS) flush 5-40 mL  5-40 mL IntraVENous PRN    naloxone (NARCAN) injection 0.4 mg  0.4 mg IntraVENous PRN    oxyCODONE IR (ROXICODONE) tablet 5 mg  5 mg Oral Q3H PRN    HYDROmorphone (DILAUDID) tablet 4 mg  4 mg Oral Q3H PRN    acetaminophen (TYLENOL) suppository 650 mg  650 mg Rectal G9N PRN    folic acid (FOLVITE) tablet 1 mg  1 mg Oral DAILY    thiamine HCL (B-1) tablet 100 mg  100 mg Oral DAILY    pantothenic ac-min oil-pet,hyd (AQUAPHOR) 41 % ointment   Topical PRN    hydrALAZINE (APRESOLINE) 20 mg/mL injection 10 mg  10 mg IntraVENous Q4H PRN    metoprolol (LOPRESSOR) injection 2.5 mg  2.5 mg IntraVENous Q6H PRN    sodium chloride (NS) flush 5-40 mL  5-40 mL IntraVENous Q8H    sodium chloride (NS) flush 5-40 mL  5-40 mL IntraVENous PRN    ondansetron (ZOFRAN) injection 4 mg  4 mg IntraVENous Q4H PRN    naloxone (NARCAN) injection 0.4 mg  0.4 mg IntraVENous PRN    bisacodyl (DULCOLAX) tablet 5 mg  5 mg Oral DAILY PRN    acetaminophen (TYLENOL) tablet 650 mg  650 mg Oral Q4H PRN    prochlorperazine (COMPAZINE) tablet 10 mg  10 mg Oral Q6H PRN    phenol throat spray (CHLORASEPTIC) 1 Spray  1 Spray Oral PRN     ______________________________________________________________________  EXPECTED LENGTH OF STAY: 4d 21h  ACTUAL LENGTH OF STAY:          9                 Ember Hinojosa MD

## 2019-05-24 NOTE — PROGRESS NOTES
Problem: Dysphagia (Adult)  Goal: *Acute Goals and Plan of Care (Insert Text)  Description  Speech pathology goals  Initiated 5/16/2019; re-evaluation 5/23/2019   1. Patient will participate in re-evaluation of swallow function within 7 days. Continue 5/23/2019    Outcome: Progressing Towards Goal     SPEECH LANGUAGE PATHOLOGY DYSPHAGIA TREATMENT  Patient: Issac Borden (84 y.o. male)  Date: 5/24/2019  Diagnosis: Alcohol intoxication ([facility] [address] [F10.929] Alcohol intoxication delirium ([facility] [address]  Procedure(s) (LRB):  C3-4 ANTERIOR CERVICAL DISCECTOMY FUSION (N/A) 3 Days Post-Op  Precautions: Aspiration Fall, Spinal    ASSESSMENT:  Patient continues to present with severe pharyngeal dysphagia characterized by reduced laryngeal elevation and multiple swallows with each ice chip trials indicating pharyngeal residue. Patient with throat clearing after each swallow. Risk of aspiration remains high due to dysphagia. NPO continues to be safest course. Patient is an excellent candidate for intensive SLP treatment for dysphagia. Progression toward goals:  ?         Improving appropriately and progressing toward goals  ? Improving slowly and progressing toward goals  ? Not making progress toward goals and plan of care will be adjusted     PLAN:  Recommendations and Planned Interventions:  NPO   Oral care  Encourage patient to swallow saliva  Patient continues to benefit from skilled intervention to address the above impairments. Continue treatment per established plan of care. Discharge Recommendations:  Inpatient Rehab     SUBJECTIVE:   Patient stated ? I'm a little confused by the tube in my throat. ?  DHT in place.     OBJECTIVE:   Cognitive and Communication Status:  Neurologic State: Alert  Orientation Level: Oriented X4  Cognition: Follows commands  Perception: Appears intact  Perseveration: No perseveration noted  Safety/Judgement: Insight into deficits  Dysphagia Treatment:  Oral Assessment:  Oral Assessment  Labial: No impairment  Dentition: Natural;Full  Oral Hygiene: Moist oral mucosa  Lingual: No impairment  Velum: No impairment  Mandible: No impairment  P.O. Trials:  Patient Position: Upright in bed  Vocal quality prior to P.O.: Breathy  Consistency Presented: Ice chips  How Presented: SLP-fed/presented;Spoon     Bolus Acceptance: No impairment  Bolus Formation/Control: No impairment     Propulsion: No impairment  Oral Residue: None  Initiation of Swallow: Delayed (# of seconds)  Laryngeal Elevation: Decreased;Weak  Aspiration Signs/Symptoms: Change vocal quality;Clear throat;Weak cough  Pharyngeal Phase Characteristics: Double swallow;Multiple swallows; Suspected pharyngeal residue             Oral Phase Severity: No impairment  Pharyngeal Phase Severity : Severe  After treatment:   ?              Patient left in no apparent distress sitting up in chair  ? Patient left in no apparent distress in bed  ? Call bell left within reach  ? Nursing notified  ? Caregiver present  ? Bed alarm activated    COMMUNICATION/EDUCATION:   Patient was educated regarding role of SLP, aspiration risk and POC. The patient?s plan of care including recommendations, planned interventions, and recommended diet changes were discussed with: Physical Therapist, Occupational Therapist and Registered Nurse. ? Posted safety precautions in patient's room.     Cherylene Pottier, SLP  Time Calculation: 12 mins

## 2019-05-24 NOTE — CONSULTS
Ears/Nose/Throat Consult    Subjective:     Date of Consultation:  May 24, 2019    Referring Physician: Claudeen Deis MD     History of Present Illness:   Patient is a 78 y.o.  male who is being seen for dysphagia . .  he  was admitted to the hospital for Alcohol intoxication ([facility] [F10.929]. Pt was found injured and eventually noted to have cord compression and had urgent ACD surgery . Before and now after , dysphagia has been problematic , worse not post op . Voice changed as well . Jamas Dubonnet Past Medical History:   Diagnosis Date    [facility]     BCCA skin    Family history of skin cancer     Hypertension     Ill-defined condition     meineer's disease      History reviewed. No pertinent family history.    Social History     Tobacco Use    Smoking status: Never Smoker    Smokeless tobacco: Never Used   Substance Use Topics    Alcohol use: Yes     Comment: twice a week     Past Surgical History:   Procedure Laterality Date    COLONOSCOPY N/A 7/26/2017    COLONOSCOPY performed by Honor Libman, MD at [facility] ENDOSCOPY    HX HEENT      wisdom teeth extracted    HX MOHS PROCEDURES  06/20/2017    [facility] R cheek by Dr. Main Scales HX TONSILLECTOMY        Current Facility-Administered Medications   Medication Dose Route Frequency    aspirin chewable tablet 81 mg  81 mg Oral DAILY    bisacodyl (DULCOLAX) suppository 10 mg  10 mg Rectal DAILY PRN    lidocaine (XYLOCAINE) 2 % viscous solution 15 mL  15 mL Other PRN    polyethylene glycol (MIRALAX) packet 17 g  17 g Oral DAILY    docusate sodium (COLACE) capsule 100 mg  100 mg Oral BID    senna (SENOKOT) tablet 17.2 mg  2 Tab Oral DAILY    lidocaine (LIDODERM) 5 % patch 2 Patch  2 Patch TransDERmal Q24H    pantoprazole (PROTONIX) 2 mg/mL oral suspension 40 mg  40 mg Per NG tube DAILY    acetaminophen (TYLENOL) solution 650 mg  650 mg Per NG tube Q6H    hydroCHLOROthiazide (HYDRODIURIL) 5 mg/mL oral suspension (compound) 25 mg  25 mg Per NG tube DAILY  levETIRAcetam (KEPPRA) 500 mg in 0.9% sodium chloride 100 mL IVPB  500 mg IntraVENous Q12H    dexamethasone (DECADRON) 4 mg/mL injection 4 mg  4 mg IntraVENous Q12H    meclizine (ANTIVERT) tablet 12.5 mg  12.5 mg Oral Q6H PRN    HYDROmorphone (PF) (DILAUDID) injection 0.5 mg  0.5 mg IntraVENous Q3H PRN    sodium chloride (NS) flush 5-40 mL  5-40 mL IntraVENous Q8H    sodium chloride (NS) flush 5-40 mL  5-40 mL IntraVENous PRN    naloxone (NARCAN) injection 0.4 mg  0.4 mg IntraVENous PRN    oxyCODONE IR (ROXICODONE) tablet 5 mg  5 mg Oral Q3H PRN    HYDROmorphone (DILAUDID) tablet 4 mg  4 mg Oral Q3H PRN    acetaminophen (TYLENOL) suppository 650 mg  650 mg Rectal W4Y PRN    folic acid (FOLVITE) tablet 1 mg  1 mg Oral DAILY    thiamine HCL (B-1) tablet 100 mg  100 mg Oral DAILY    pantothenic ac-min oil-pet,hyd (AQUAPHOR) 41 % ointment   Topical PRN    hydrALAZINE (APRESOLINE) 20 mg/mL injection 10 mg  10 mg IntraVENous Q4H PRN    metoprolol (LOPRESSOR) injection 2.5 mg  2.5 mg IntraVENous Q6H PRN    sodium chloride (NS) flush 5-40 mL  5-40 mL IntraVENous Q8H    sodium chloride (NS) flush 5-40 mL  5-40 mL IntraVENous PRN    ondansetron (ZOFRAN) injection 4 mg  4 mg IntraVENous Q4H PRN    naloxone (NARCAN) injection 0.4 mg  0.4 mg IntraVENous PRN    bisacodyl (DULCOLAX) tablet 5 mg  5 mg Oral DAILY PRN    acetaminophen (TYLENOL) tablet 650 mg  650 mg Oral Q4H PRN    prochlorperazine (COMPAZINE) tablet 10 mg  10 mg Oral Q6H PRN    phenol throat spray (CHLORASEPTIC) 1 Spray  1 Spray Oral PRN      Allergies   Allergen Reactions    Lorazepam Other (comments)     Made him hyper, could not sit still for mri        Review of Systems:  Pertinent items are noted in the History of Present Illness.      Objective:     Patient Vitals for the past 8 hrs:   BP Temp Pulse Resp SpO2 Height Weight   05/24/19 0923 158/76 97.7 °F (36.5 °C) 96 16 96 % -- --   05/24/19 0702 -- -- -- -- -- 5' 8\" (1.727 m) 75.7 kg (166 lb 14.2 oz)     Temp (24hrs), Av °F (36.7 °C), Min:97.6 °F (36.4 °C), Max:98.6 °F (37 °C)    1901 -  0700  In: 120   Out: 2700 [Urine:2700]    Physical Exam:   General  General Appearance- Well nourished adult in no apparent distress who is alert and cooperative. Gait- Normal. Voice-  Abnormal and breathy   Pt in c-collar , awake , cooperative     HEENT  Head: Normally developed without evidence of trauma or lesions. Face:  Skin:  Normal color, texture, and turgor. There are no lesions of concern nor swelling or induration. Lips- normal.  Facial Nerve- Bilateral- function is equal and symmetrical with no deficit. Ear: Auricle- Left- Normal development without sinus pit, cyst or any other lesion. Right- Normal development without sinus pit, cyst or any other lesion  Auditory Canal (otoscopic examination) - Left- clean, without edema, discharge, or lesions. Right - clean, without edema, discharge, or lesions. Tympanic membrane:  Left- intact and mobile, without middle ear effusion, retraction, or sclerosis. Right- intact and mobile, without middle ear effusion, retraction or sclerosis. Mastoid- Left- No erythema, edema, tenderness, or protrusion of the auricle. Right- No erythema, edema, tenderness, or protrusion of the auricle. Nose: External Nose- Normally developed without lesion. Nasal Mucosa- moist and pink without erythema, edema, or lesions. Nasal septum- deviated carol . Turbinates- Bilateral- The turbinates are without hypertrophy, edema, or lesion. Sinuses-  All sinuses are nontender to percussion. Oral Cavity/Oropharynx--Dentition- Normal dentition for age witho no evidence of discoloration, inflammation, or infection. Oral Mucosa dry   without erythema or lesions. Tongue- no lesions or palpable masses. Floor of mouth- soft without palpable masses or mucosal lesion. Palate and uvula- Palate is without cleft and the uvula is not bifid. Soft palate elevates symmetrically. Tonsils- Bilateral -Normal in size without exudates or erythema. Salivary Ducts-  Ducts appear to be normal.  Throat: Pharynx- Normal mucosal lining without erythema or mass. Larynx: difficult to examine directly. Neck: limited exam due to c collar   Thyroid- normal to palpation without mass or irregularity. Lymph Nodes- No palpable adenopathy or masses. Range of Motion- good in all directions, with good anterior-posterior and lateral flexion and rotation. Chest and Lung Exam: Normal respiratory effort with no wheezing, retractions, or rales. Cardiovascular: Regular rate and rhythm. Normal peripheral pulses without bruits. Neurologic exam: Alert and oriented to person, place, and time. Cranial Nerves II-XII intact bilaterally. Pupils equally round, reactive to light. Extraocular movements are intact bilaterally. No baseline nystagmus. Procedure:  Fiberoptic Laryngoscopy:  After topically decongesting the nose with afrin spray, a flexible fiberoptic endoscope was passed through the nose and into the pharynx. Nasal cavity: There was no evidence of discharge, polyps, synechea, mass or any other mucosal lesions. Nasopharynx:  No discharge , mass, or lesion. Base of tongue and vallecula- No evidence of lesion, erythema, or mass notes. Larynx/pharynx:rt vocal cord is out ! Non mobile , perilaryngeal and pharyngeal edema throughout       Pharynx and pyriform sinus-  Lower pharynx and pyriform sinus without lesion or mass. Postcricoid-    edema noted. Subglottis-  Limited view of the subglottis does not reveal any lesion or mass. The patient tolerated the procedure well. Assessment:     Dysphagia     Secondary to many issues including :   1. Trauma noted and damage to surrounding c spine that sits posterior to esophagus   2. Recent surgery that is along the esophagus and   3. Rt vocal cord paralysis     Rt vocal cord issue could be from trauma itself or other causes . Bee Jeffery Unless there is suspicion that cord nerve was cut recently ( and no suspicion is noted ) , then intervention is not warranted and cautious observation is all  That is recommended . Cox Junk Many , most pt's with cord injury can swallow , so , my assumption is that the edema is the main culprit here . Cox Junk As the swelling from surgery and trauma calms down , maybe a week or less, than option is there to ponder a repeat swallow study . Cox Junk Till then , consider feeding tube per nose and if fails again in  Week , consider PEG   F/u with [place]  [facility] in a month either way .      Hospital Problems  Date Reviewed: 5/20/2019          Codes Class Noted POA    Spinal cord injury at C1-C4 level [facility]) ICD-10-CM: S14.101A  ICD-9-CM: 952.00  5/22/2019 Unknown        * (Principal) [facility] delir[facility] ([facility] ICD-10-CM: F10.121  ICD-9-CM: 291.0  5/15/2019 Yes        Alcohol intoxication ([facility] ICD-10-CM: B32.021  ICD-9-CM: 305.00  5/15/2019 Unknown                     Signed By: Nereyda Sanders MD     May 24, 2019

## 2019-05-24 NOTE — PROGRESS NOTES
POD 3  Walked with PT yesterday  Dobhoff placed  afeb, BP improved  Alert  Proximal UE 4/5, 4-/5 hands  5/5 LE  S/P: ACDF 3/4 for decompression, spinal cord injury (central cord syndrome)  Will require inpatient spinal cord rehab  Will wean steroids  NPO, may need PEG  Cont PT

## 2019-05-25 PROCEDURE — 74011250636 HC RX REV CODE- 250/636: Performed by: HOSPITALIST

## 2019-05-25 PROCEDURE — 74011250636 HC RX REV CODE- 250/636: Performed by: INTERNAL MEDICINE

## 2019-05-25 PROCEDURE — 65270000029 HC RM PRIVATE

## 2019-05-25 PROCEDURE — 74011000258 HC RX REV CODE- 258: Performed by: HOSPITALIST

## 2019-05-25 PROCEDURE — 97116 GAIT TRAINING THERAPY: CPT

## 2019-05-25 PROCEDURE — 97535 SELF CARE MNGMENT TRAINING: CPT

## 2019-05-25 PROCEDURE — 97530 THERAPEUTIC ACTIVITIES: CPT

## 2019-05-25 PROCEDURE — 74011250637 HC RX REV CODE- 250/637: Performed by: INTERNAL MEDICINE

## 2019-05-25 PROCEDURE — 74011000250 HC RX REV CODE- 250: Performed by: INTERNAL MEDICINE

## 2019-05-25 RX ORDER — DEXAMETHASONE SODIUM PHOSPHATE 4 MG/ML
2 INJECTION, SOLUTION INTRA-ARTICULAR; INTRALESIONAL; INTRAMUSCULAR; INTRAVENOUS; SOFT TISSUE EVERY 12 HOURS
Status: DISCONTINUED | OUTPATIENT
Start: 2019-05-25 | End: 2019-05-27

## 2019-05-25 RX ORDER — DEXAMETHASONE 4 MG/1
2 TABLET ORAL DAILY
Status: DISCONTINUED | OUTPATIENT
Start: 2019-05-26 | End: 2019-05-25

## 2019-05-25 RX ORDER — DEXAMETHASONE 4 MG/1
2 TABLET ORAL EVERY 12 HOURS
Status: DISCONTINUED | OUTPATIENT
Start: 2019-05-25 | End: 2019-05-25

## 2019-05-25 RX ADMIN — HYDROMORPHONE HYDROCHLORIDE 0.5 MG: 1 INJECTION, SOLUTION INTRAMUSCULAR; INTRAVENOUS; SUBCUTANEOUS at 22:21

## 2019-05-25 RX ADMIN — Medication 10 ML: at 22:21

## 2019-05-25 RX ADMIN — HYDROMORPHONE HYDROCHLORIDE 0.5 MG: 1 INJECTION, SOLUTION INTRAMUSCULAR; INTRAVENOUS; SUBCUTANEOUS at 18:04

## 2019-05-25 RX ADMIN — SODIUM CHLORIDE 500 MG: 900 INJECTION, SOLUTION INTRAVENOUS at 16:27

## 2019-05-25 RX ADMIN — Medication 10 ML: at 16:27

## 2019-05-25 RX ADMIN — HYDROMORPHONE HYDROCHLORIDE 0.5 MG: 1 INJECTION, SOLUTION INTRAMUSCULAR; INTRAVENOUS; SUBCUTANEOUS at 11:36

## 2019-05-25 RX ADMIN — POLYETHYLENE GLYCOL 3350 17 G: 17 POWDER, FOR SOLUTION ORAL at 13:46

## 2019-05-25 RX ADMIN — DEXAMETHASONE SODIUM PHOSPHATE 2 MG: 4 INJECTION, SOLUTION INTRAMUSCULAR; INTRAVENOUS at 16:27

## 2019-05-25 RX ADMIN — SODIUM CHLORIDE 500 MG: 900 INJECTION, SOLUTION INTRAVENOUS at 03:58

## 2019-05-25 RX ADMIN — Medication 25 MG: at 10:17

## 2019-05-25 RX ADMIN — PANTOPRAZOLE SODIUM 40 MG: 40 TABLET, DELAYED RELEASE ORAL at 10:18

## 2019-05-25 RX ADMIN — HYDROMORPHONE HYDROCHLORIDE 0.5 MG: 1 INJECTION, SOLUTION INTRAMUSCULAR; INTRAVENOUS; SUBCUTANEOUS at 04:06

## 2019-05-25 NOTE — PROGRESS NOTES
Neurosurgery Spine Progress Note  Katy Webbdict, AGACNP-BC  Work Cell: [phone]    Post Op Day: 4 Day Post-Op    May 25, 2019 1:17 PM     Braydon Massey    HPI:      Braydon Massey is a 78 y.o. male with history of Meniere's disease, hypertension and GERD who was found down in his backyard unresponsive with foam at the mouth. EMS was called and patient was brought to [facility] ED and he was found to have an alcohol level of 162. Neurology was consulted for possible seizure and he was started on Keppra. After recovery from his intoxication and lethargy his family noticed he had weakness in his arms. It was also noted that he had urinary retention and difficulty swallowing. MRI of the cervical spine was completed which showed disc herniation at C3-C4 level with some edema in the central cord. The neurosurgery service was consulted for evaluation. Subjective      Patient with dobhoff in place due to difficulty swallowing. Awaiting authorization for inpt rehab. Pain is managed. He has some pain in his shoulder blades. He states he walked with therapy yesterday. Still extremely weak in his arms and continues with difficulty holding objects and knowing where his arms/hands are in space. He denies headache, dizziness, chest pain, sob, abdominal pain, fever, chills, or nausea/vomiting. Objective:     Physical Exam:  General:  Alert and oriented. No acute distress. Respiratory:  Breathing unlabored. Equal chest expansion   Abdomen:   CV: Soft, non-tender, non-distended   No edema appreciated in the extremities   Neurologic:        Musculoskeletal:  Speech fluent. No facial droop. Follows commands. BUENO. Dense numbness in hands with impaired proprioception. Motor: deltoids 4/5, biceps 3/5, triceps 3/5, 1-2/5 in his  strength. Lower extremities approximately 4+/5. - nair. + babinski. Calves soft, nontender upon palpation and with passive stretch. Moves both upper and lower extremities. Incision: clean, dry, and intact. No significant erythema or swelling. No active drainage noted. Vital Signs:    Patient Vitals for the past 8 hrs:   BP Temp Pulse Resp SpO2   19 0251 161/77 98.1 °F (36.7 °C) 76 16 94 %     Temp (24hrs), Av.9 °F (36.6 °C), Min:97.7 °F (36.5 °C), Max:98.1 °F (36.7 °C)      Intake/Output:  No intake/output data recorded.  1901 -  0700  In: 240   Out: 2250 [Urine:2250]    Pain Control:   Pain Assessment  Pain Scale 1: Numeric (0 - 10)  Pain Intensity 1: 7  Pain Onset 1: postop  Pain Location 1: Neck, Shoulder  Pain Orientation 1: Left, Right  Pain Description 1: Aching  Pain Intervention(s) 1: Medication (see MAR)    LAB:    Recent Labs     19   HCT 46.6   HGB 15.1     Lab Results   Component Value Date/Time    Sodium 135 (L) 2019 02:57 AM    Potassium 4.1 2019 02:57 AM    Chloride 102 2019 02:57 AM    CO2 27 2019 02:57 AM    Glucose 154 (H) 2019 02:57 AM    BUN 20 2019 02:57 AM    Creatinine 0.67 (L) 2019 02:57 AM    Calcium 8.3 (L) 2019 02:57 AM       PT/OT:   Gait:  Gait  Base of Support: Narrowed, Center of gravity altered  Speed/Olive: Fluctuations  Gait Abnormalities: Decreased step clearance, Altered arm swing  Ambulation - Level of Assistance: Minimal assistance  Distance (ft): 75 Feet (ft)(x 3)  Assistive Device: Gait belt                   Assessment/Plan      1. Central Cord Syndrome with spinal cord signal change at C3-4 after GLF   -MRI revealed C3-4 herniated disc with cord edema and cervical stenosis   -POD#4 C3-4 ANTERIOR CERVICAL DISCECTOMY FUSION with Dr. Alex De Luna   -Continue PT/OT.  Patient would greatly benefit from inpatient rehab to regain his strength and function following a cervical spinal cord injury   -Physiatry consult   -Pain control- PRN oxycodone, tylenol   -wean decadron - taper in orders.   -stop IV toradol   -Incentive Spirometer   -Tolerating diet   -VTE Prophylaxes - TEDS & SCDs    2.  Neurogenic bladder   -due to #1   -Lofton replaced on 05/24 due to failed voiding trial    3. Alcohol abuse   -counseling as able   -thiamine, folic acid    4. Dysphagia   -SLP consulted- patient with severe pharyngeal dysphagia. Recommendations to maintain NPO status. - Dobhoff tube placed and tube feedings started   - Speech to re-eval pt on Tuesday. If fails, then GI will be consulted for PEG placement   - ENT F/U as outpatient. 5. Possible seizure   -? Etoh induced. Brain MRI changes seen in the left medial hippocampus- needs repeat imaging in 1 month with neuro   -tolerating low dose Keppra. Neuro recommends continuing on discharge    6. Hypertension   -BP med down NG tube   -manage pain, prn hydralazine   -Hospitalist following    7. Menieres disease   -patient on hctz to help with sodium diuresis. Can consider meclizine if he becomes symptomatic with dizziness    8. Possible CVA   -MRA head/neck revealed moderate left P2 stenosis without occlusion.   -discussed with hospitalist- ok to start ASA on POD#3 (Friday). ASA restarted 05/24    9. Constipation   -bisacodyl suppository    Plan above discussed with Dr. Maggi Chang, wife at bedside    Discharge To:  Rehab    Continue to strongly recommend discharge to inpatient rehab. Pt was highly functional prior to this injury, taking care of himself, doing aerobics at the [facility], etc. He has multiple medical issues from this injury and will benefit from all 3 therapy disciplines at inpt rehab -PT/OT/Speech. Due to his SCI, he needs aggressive rehab to regain function. Awaiting physiatry consult.     Signed By: Joann Church NP

## 2019-05-25 NOTE — PROGRESS NOTES
Problem: Self Care Deficits Care Plan (Adult)  Goal: *Acute Goals and Plan of Care (Insert Text)  Description  Occupational Therapy Goals  Initiated 5/22/2019  1. Patient will perform upper body dressing with supervision/set-up within 7 day(s) using compensatory strategies PRN. 2.  Patient will perform lower body dressing with minimal assistance/contact guard assist within 7 day(s) using compensatory strategies PRN. 3.  Patient will perform grooming with minimal assistance/contact guard assist within 7 day(s) using compensatory strategies PRN. 4.  Patient will perform toilet transfers with minimal assistance/contact guard assist within 7 day(s). 5.  Patient will perform all aspects of toileting with minimal assistance/contact guard assist within 7 day(s). 6.  Patient will demonstrate ability to perform self-ROM for R and L digit/wrist extension with minimal assistance within 7 day(s). Outcome: Progressing Towards Goal     OCCUPATIONAL THERAPY TREATMENT  Patient: Jorge Batista (48 y.o. male)  Date: 5/25/2019  Diagnosis: Alcohol intoxication ([facility] [F10.929] Alcohol intoxication delirium ([facility]  Procedure(s) (LRB):  C3-4 ANTERIOR CERVICAL DISCECTOMY FUSION (N/A) 4 Days Post-Op  Precautions: Fall, Spinal  Chart, occupational therapy assessment, plan of care, and goals were reviewed. ASSESSMENT:  Patient is progressing well but continues to be limited by poor fine motor coordination bilaterally, impaired wrist and hand strength/ROM, decreased sitting and standing balance with decline in functional independence s/p C3-4 ACDF POD 4. Pt motivated to work with therapy, required min A for supine to sit with increased A needed for scooting due to air mattress. Pt with intact sensation to light touch and temperature bilaterally, impaired proprioception. Participated in teeth brushing task with increased time and cues, min to mod A with proximal stabilization at R elbow on table tray.  Poor in hand manipulation of automatic toothbrush in R hand, needing A to correct. Pt would highly benefit from IP rehab as he is in need of intensive rehab from all 3 disciplines. Family is extremely supportive and present during session. Progression toward goals:  ?       Improving appropriately and progressing toward goals  ? Improving slowly and progressing toward goals  ? Not making progress toward goals and plan of care will be adjusted     PLAN:  Patient continues to benefit from skilled intervention to address the above impairments. Continue treatment per established plan of care. Discharge Recommendations:  IP REHAB  Further Equipment Recommendations for Discharge:  TBD     SUBJECTIVE:   Patient stated ? I can feel that your hands are cold. ?    OBJECTIVE DATA SUMMARY:   Cognitive/Behavioral Status:  Neurologic State: Alert  Orientation Level: Oriented X4  Cognition: Appropriate decision making             Functional Mobility and Transfers for ADLs:  Bed Mobility:  Supine to Sit: Minimum assistance;Assist x1  Scooting: Moderate assistance;Maximum assistance;Assist x1(difficulty with air mattress)    Transfers:  Sit to Stand: Minimum assistance; Additional time     Bed to Chair: Minimum assistance;Assist x2(HHA)    Balance:  Sitting: Impaired; Without support  Sitting - Static: Fair (occasional)  Standing: Impaired; With support  Standing - Static: Constant support; Fair  Standing - Dynamic : Fair    ADL Intervention:       Grooming  Brushing Teeth: Proximal stability; Compensatory technique training;Training to use affected extremity as a gross motor assistance;Minimum assistance; Moderate assistance          Poor in hand manipulation of automatic in R hand, gross grasp on R with L hand also to support. Proximal support at R elbow on table tray. Pt able to remove toothpaste lid with pincer grasp on R hand while holding toothpaste in L.                         Neuro Re-Education:   Attempted manual cues at R tricep with open palms on knees to facilitate WB on palms. Pain:  Pain Scale 1: Numeric (0 - 10)  Pain Intensity 1: 5  Pain Location 1: Neck  Pain Orientation 1: Left;Right  Pain Description 1: Aching  Pain Intervention(s) 1: Declines;Distraction; Emotional support  Activity Tolerance:   VSS  Please refer to the flowsheet for vital signs taken during this treatment. After treatment:   ? Patient left in no apparent distress sitting up in chair  ? Patient left in no apparent distress in bed  ? Call bell left within reach  ? Nursing notified  ? Caregiver present  ?  Bed alarm activated    COMMUNICATION/COLLABORATION:   The patient?s plan of care was discussed with: Physical Therapist and Registered Nurse    Deloras Halsted, OT  Time Calculation: 27 mins

## 2019-05-25 NOTE — PROGRESS NOTES
Problem: Mobility Impaired (Adult and Pediatric)  Goal: *Acute Goals and Plan of Care (Insert Text)  Description  Physical Therapy Goals  Initiated 5/18/2019  1. Patient will move from supine to sit and sit to supine , scoot up and down and roll side to side in bed with minimal assistance/contact guard assist within 7 day(s). 2.  Patient will transfer from bed to chair and chair to bed with minimal assistance/contact guard assist using the least restrictive device within 7 day(s). 3.  Patient will perform sit to stand with minimal assistance/contact guard assist within 7 day(s). 4.  Patient will ambulate with minimal assistance/contact guard assist for 150 feet with the least restrictive device within 7 day(s). 5.  Patient will participate in a strengthening program and be independent within 7 days. Outcome: Progressing Towards Goal    PHYSICAL THERAPY TREATMENT  Patient: Anabel Henriquez (89 y.o. male)  Date: 5/25/2019  Diagnosis: Alcohol intoxication ([facility] [address] [F10.929] Alcohol intoxication delirium ([facility] [address]  Procedure(s) (LRB):  C3-4 ANTERIOR CERVICAL DISCECTOMY FUSION (N/A) 4 Days Post-Op  Precautions: Fall, Spinal  Chart, physical therapy assessment, plan of care and goals were reviewed. ASSESSMENT:  Patient was sitting up in chair finishing OT session, agreeable to PT session. Patient reported feeling slightly dizzy, which he reports could be due to Meneire's. Patient's BP sitting in RUE was 153/81. Patient performed sit<>stand transfer with minimum assistance due to difficulty pushing through BUE to assist. Patient ambulated 300' with no AD on level surfaces. Patient required minimum assistance due to unsteadiness and trunk sway. PT educated patient on arm sway to help improve his dynamic standing balance, patient attempted a few times but unable to maintain for the duration of walking. Patient's BP in RUE post-walking was 165/83.  PT provided family education on how to apply gait belt and safe technique for walking patient. PT provided patient/family education regarding leg exercises for strengthening to improve toe clearance during walking. Patient left sitting in chair, no complaints, family present. Progression toward goals:  ?      Improving appropriately and progressing toward goals  ? Improving slowly and progressing toward goals  ? Not making progress toward goals and plan of care will be adjusted     PLAN:  Patient continues to benefit from skilled intervention to address the above impairments. Continue treatment per established plan of care. Discharge Recommendations:  Inpatient Rehab  Further Equipment Recommendations for Discharge:  none at this time      SUBJECTIVE:   Patient stated ? I want to walk.?     OBJECTIVE DATA SUMMARY:   Critical Behavior:  Neurologic State: Alert  Orientation Level: Oriented X4  Cognition: Appropriate decision making  Safety/Judgement: Insight into deficits  Functional Mobility Training:  Bed Mobility:  Log    Supine to Sit: Minimum assistance;Assist x1     Scooting: Moderate assistance;Maximum assistance;Assist x1(difficulty with air mattress)        Transfers:  Sit to Stand: Minimum assistance;Assist x1;Additional time  Stand to Sit: Minimum assistance;Assist x1;Additional time        Bed to Chair: Minimum assistance;Assist x2(HHA)                    Balance:  Sitting: Intact; With support  Sitting - Static: Fair (occasional)  Standing: Impaired  Standing - Static: Fair;Constant support  Standing - Dynamic : Fair;Constant support  Ambulation/Gait Training:  Distance (ft): 300 Feet (ft)  Assistive Device: Gait belt  Ambulation - Level of Assistance: Minimal assistance        Gait Abnormalities: Altered arm swing;Decreased step clearance;Shuffling gait        Base of Support: Narrowed(toe out)     Speed/Olive: Fluctuations; Shuffled                  Stairs:            Therapeutic Exercises:   PT educated on [address], and heel/toe raises with ankle weights and theraband for strengthening. Pain:  Pain Scale 1: Numeric (0 - 10)  Pain Intensity 1: 5  Pain Location 1: Neck  Pain Orientation 1: Left;Right  Pain Description 1: Aching  Pain Intervention(s) 1: Declines;Distraction; Emotional support  Activity Tolerance:   No SOB, mild dizziness reported, see flowsheet. After treatment:   ?  Patient left in no apparent distress sitting up in chair  ? Patient left in no apparent distress in bed  ? Call bell left within reach  ? Nursing notified  ? Caregiver present  ?   Bed alarm activated    COMMUNICATION/COLLABORATION:   The patient?s plan of care was discussed with: Registered Nurse    Benitez Denney PT   Time Calculation: 25 mins

## 2019-05-26 LAB
ANION GAP SERPL CALC-SCNC: 3 MMOL/L (ref 5–15)
BUN SERPL-MCNC: 16 MG/DL (ref 6–20)
BUN/CREAT SERPL: 29 (ref 12–20)
CALCIUM SERPL-MCNC: 8.2 MG/DL (ref 8.5–10.1)
CHLORIDE SERPL-SCNC: 100 MMOL/L (ref 97–108)
CO2 SERPL-SCNC: 32 MMOL/L (ref 21–32)
COMMENT, HOLDF: NORMAL
CREAT SERPL-MCNC: 0.56 MG/DL (ref 0.7–1.3)
GLUCOSE SERPL-MCNC: 128 MG/DL (ref 65–100)
MAGNESIUM SERPL-MCNC: 2.1 MG/DL (ref 1.6–2.4)
PHOSPHATE SERPL-MCNC: 3.4 MG/DL (ref 2.6–4.7)
POTASSIUM SERPL-SCNC: 3.7 MMOL/L (ref 3.5–5.1)
SAMPLES BEING HELD,HOLD: NORMAL
SODIUM SERPL-SCNC: 135 MMOL/L (ref 136–145)

## 2019-05-26 PROCEDURE — 74011000258 HC RX REV CODE- 258: Performed by: HOSPITALIST

## 2019-05-26 PROCEDURE — 74011250636 HC RX REV CODE- 250/636: Performed by: INTERNAL MEDICINE

## 2019-05-26 PROCEDURE — 74011250637 HC RX REV CODE- 250/637: Performed by: NURSE PRACTITIONER

## 2019-05-26 PROCEDURE — 74011250637 HC RX REV CODE- 250/637: Performed by: INTERNAL MEDICINE

## 2019-05-26 PROCEDURE — 36415 COLL VENOUS BLD VENIPUNCTURE: CPT

## 2019-05-26 PROCEDURE — 77030018798 HC PMP KT ENTRL FED COVD -A

## 2019-05-26 PROCEDURE — 84100 ASSAY OF PHOSPHORUS: CPT

## 2019-05-26 PROCEDURE — 97530 THERAPEUTIC ACTIVITIES: CPT

## 2019-05-26 PROCEDURE — 83735 ASSAY OF MAGNESIUM: CPT

## 2019-05-26 PROCEDURE — 97116 GAIT TRAINING THERAPY: CPT

## 2019-05-26 PROCEDURE — 65270000029 HC RM PRIVATE

## 2019-05-26 PROCEDURE — 74011000250 HC RX REV CODE- 250: Performed by: INTERNAL MEDICINE

## 2019-05-26 PROCEDURE — 74011250636 HC RX REV CODE- 250/636: Performed by: HOSPITALIST

## 2019-05-26 PROCEDURE — 74011250636 HC RX REV CODE- 250/636: Performed by: NEUROLOGICAL SURGERY

## 2019-05-26 PROCEDURE — 80048 BASIC METABOLIC PNL TOTAL CA: CPT

## 2019-05-26 RX ORDER — ENOXAPARIN SODIUM 100 MG/ML
40 INJECTION SUBCUTANEOUS EVERY 24 HOURS
Status: DISCONTINUED | OUTPATIENT
Start: 2019-05-26 | End: 2019-06-01 | Stop reason: HOSPADM

## 2019-05-26 RX ORDER — DEXTROMETHORPHAN POLISTIREX 30 MG/5 ML
SUSPENSION, EXTENDED RELEASE 12 HR ORAL AS NEEDED
Status: DISCONTINUED | OUTPATIENT
Start: 2019-05-26 | End: 2019-06-01 | Stop reason: HOSPADM

## 2019-05-26 RX ADMIN — Medication 25 MG: at 10:55

## 2019-05-26 RX ADMIN — BISACODYL 10 MG: 10 SUPPOSITORY RECTAL at 09:26

## 2019-05-26 RX ADMIN — ACETAMINOPHEN 650 MG: 650 SOLUTION ORAL at 08:02

## 2019-05-26 RX ADMIN — Medication 10 ML: at 06:00

## 2019-05-26 RX ADMIN — Medication 10 ML: at 14:02

## 2019-05-26 RX ADMIN — SODIUM CHLORIDE 500 MG: 900 INJECTION, SOLUTION INTRAVENOUS at 04:17

## 2019-05-26 RX ADMIN — DEXAMETHASONE SODIUM PHOSPHATE 2 MG: 4 INJECTION, SOLUTION INTRAMUSCULAR; INTRAVENOUS at 04:17

## 2019-05-26 RX ADMIN — ACETAMINOPHEN 650 MG: 650 SOLUTION ORAL at 14:02

## 2019-05-26 RX ADMIN — HYDROMORPHONE HYDROCHLORIDE 0.5 MG: 1 INJECTION, SOLUTION INTRAMUSCULAR; INTRAVENOUS; SUBCUTANEOUS at 18:31

## 2019-05-26 RX ADMIN — SODIUM CHLORIDE 500 MG: 900 INJECTION, SOLUTION INTRAVENOUS at 16:09

## 2019-05-26 RX ADMIN — POLYETHYLENE GLYCOL 3350 17 G: 17 POWDER, FOR SOLUTION ORAL at 08:02

## 2019-05-26 RX ADMIN — DEXAMETHASONE SODIUM PHOSPHATE 2 MG: 4 INJECTION, SOLUTION INTRAMUSCULAR; INTRAVENOUS at 16:08

## 2019-05-26 RX ADMIN — ENOXAPARIN SODIUM 40 MG: 40 INJECTION SUBCUTANEOUS at 11:52

## 2019-05-26 RX ADMIN — HYDROMORPHONE HYDROCHLORIDE 0.5 MG: 1 INJECTION, SOLUTION INTRAMUSCULAR; INTRAVENOUS; SUBCUTANEOUS at 04:17

## 2019-05-26 RX ADMIN — PANTOPRAZOLE SODIUM 40 MG: 40 TABLET, DELAYED RELEASE ORAL at 08:02

## 2019-05-26 NOTE — PROGRESS NOTES
Stable this am up in chair. Reports slow, steady improvement. Moving hands some. Has job and justin. Repeat mbs tues.

## 2019-05-26 NOTE — PROGRESS NOTES
Hospitalist Progress Note  Jacquline Merlin, MD  Answering service: [phone] from in house phone  Cell: [phone]      Date of Service:  2019  NAME:  Duane Wilson  :  1950  MRN:  [id number]      Admission Summary:   76-year-old man with a past medical history significant for hypertension, gastroesophageal reflux disease, Meniere's disease, who was in his usual state of health until the day of his presentation at the emergency room when the patient developed a change in mental status. The spouse stated that she was out of the house and when she came back found the patient laying prone in the backyard unresponsive. It is not clear how long the patient had been lying prone in the backyard. When the EMS arrived at the scene, it was reported that the patient was verbally responsive to the EMS crew that he had too much alcohol. The patient was administering alcohol by rectal enema stating that he wanted to try something new. The patient has no history of alcohol abuse according to his spouse, but today the patient had too much alcohol than usual.  The patient was brought to the emergency room for further evaluation. When the patient arrived at the emergency room, the patient remained lethargic, but easily arousable and also with verbal response. CT scan of the head was obtained, this was negative. The patient's alcohol level was 162. The patient was referred to the Hospitalist Service for evaluation for admission. No record of prior admission to this hospital    Interval history / Subjective:   F/U after cord compression, AMS   Has not yet had a bowel movement since admission. Pain is under control when not moving    Feels like his hands are getting slightly better every day.    Assessment & Plan:     B/L upper extremity weakness and tingling in arms with neck and shoulder pain POA but  more evident after he woke up later in hospitalization  - secondary to cervical cord compression from C3/C4 disc herniation  -s/p 5/21 Anterior cervical diskectomy, decompression, fusion to C3-4  -On Decadron, wean per NSGY  -NSGY following  - PT/OT    Delirium and fall , resolved on 5/18  -possibly secondary to seizure, resolved. -MRI shows hippocampus restricted diffusion , likley suggestive of seizure like activity ?  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  -Also initially though delirium compounded by alcohol intoxication, level 162  -EEG , no seizure , started on keppra by neuro though for now   -Echo EF 61-65% with no RWMA, no PFO  -Continue on Keppra till seen by Dr Renetta Orantes  -The patient is back to baseline mental status  -Repeat MRI brain in 4 weeks, follow up outpatient with Dr. Renetta Orantes in 4 weeks. Dysphagia - secondary to edema, R vocal cord paralysis, spinal injury   - SLP following, if no improvement will need to discuss PEG (will readdress early next week)  - ENT following,   - Dophoff placement 02/23  - Will need to consult GI next week if PEG is warranted. Seizure like activity   -Started on keppra by neuro   -MRI shows hippocampus restricted diffusion , likley suggestive of seizure like activity     Leukocytosis and lactic acidosis on admission- reactive 2/2 seizure, resolved. HTN - On prn hydralazine, continue HCTZ, BP goal in hospital less than 160  Alcohol intoxication and now risk of withdrawal Resolved     Neurogenic bladder from above - anderson removed 5/23, tamsulosin started.      PT/OT     Code status:  Full   DVT prophylaxis: heparin s/q when cleared by Surgery    Care Plan discussed with: Patient/Family  Disposition: TBD     Hospital Problems  Date Reviewed: 5/20/2019          Codes Class Noted POA    Spinal cord injury at C1-C4 level [facility]) ICD-10-CM: S14.101A  ICD-9-CM: 952.00  5/22/2019 Unknown        * (Principal) Alcohol intoxication delirium ([facility] [address] ICD-10-CM: F56.694  ICD-9-CM: 291.0  5/15/2019 Yes        Alcohol intoxication [facility]) ICD-10-CM: L18.794  ICD-9-CM: 305.00  5/15/2019 Unknown                Review of Systems:   A comprehensive review of systems was negative except for that written in the HPI. Vital Signs:    Last 24hrs VS reviewed since prior progress note. Most recent are:  Visit Vitals  /80 (BP 1 Location: Left arm, BP Patient Position: At rest)   Pulse 62   Temp 97.7 °F (36.5 °C)   Resp 18   Ht 5' 8\" (1.727 m)   Wt 75.5 kg (166 lb 7.2 oz)   SpO2 97%   BMI 25.31 kg/m²         Intake/Output Summary (Last 24 hours) at 5/26/2019 1544  Last data filed at 5/26/2019 1411  Gross per 24 hour   Intake 120 ml   Output 2450 ml   Net -2330 ml        Physical Examination:             Constitutional:   Awake and conversant, NAD   ENT:  Oral mucous moist, oropharynx benign. + C collar in place, NG in place   Resp:  CTA bilaterally. No wheezing/rhonchi/rales. No accessory muscle use   CV:  Regular rhythm, normal rate, no murmurs, gallops, rubs    GI:  Soft, non distended, non tender. normoactive bowel sounds, no hepatosplenomegaly     Musculoskeletal:  No edema, warm, 2+ pulses throughout    Neurologic:  upper extremity 3-4/5 b/l             Data Review: All imaging and laboratory data reviewed. Labs:     Recent Labs     05/24/19  0257   WBC 9.3   HGB 15.1   HCT 46.6        Recent Labs     05/26/19  0432 05/24/19  0257   * 135*   K 3.7 4.1    102   CO2 32 27   BUN 16 20   CREA 0.56* 0.67*   * 154*   CA 8.2* 8.3*   MG 2.1 2.4   PHOS 3.4 3.1     No results for input(s): SGOT, GPT, ALT, AP, TBIL, TBILI, TP, ALB, GLOB, GGT, AML, LPSE in the last 72 hours. No lab exists for component: AMYP, HLPSE  No results for input(s): INR, PTP, APTT in the last 72 hours. No lab exists for component: INREXT, INREXT   No results for input(s): FE, TIBC, PSAT, FERR in the last 72 hours.    No results found for: FOL, RBCF   No results for input(s): PH, PCO2, PO2 in the last 72 hours. No results for input(s): CPK, CKNDX, TROIQ in the last 72 hours.     No lab exists for component: CPKMB  Lab Results   Component Value Date/Time    Cholesterol, total 129 05/15/2019 01:54 AM    HDL Cholesterol 54 05/15/2019 01:54 AM    LDL, calculated 65 05/15/2019 01:54 AM    Triglyceride 50 05/15/2019 01:54 AM    CHOL/HDL Ratio 2.4 05/15/2019 01:54 AM     Lab Results   Component Value Date/Time    Glucose (POC) 148 (H) 05/18/2019 04:47 PM    Glucose (POC) 170 (H) 05/18/2019 12:37 PM    Glucose (POC) 114 (H) 05/17/2019 04:42 PM    Glucose (POC) 82 05/17/2019 12:11 PM    Glucose (POC) 88 05/17/2019 06:25 AM     Lab Results   Component Value Date/Time    Color YELLOW/STRAW 05/14/2019 08:14 PM    Appearance CLEAR 05/14/2019 08:14 PM    Specific gravity 1.016 05/14/2019 08:14 PM    pH (UA) 5.5 05/14/2019 08:14 PM    Protein NEGATIVE  05/14/2019 08:14 PM    Glucose NEGATIVE  05/14/2019 08:14 PM    Ketone 15 (A) 05/14/2019 08:14 PM    Bilirubin NEGATIVE  05/14/2019 08:14 PM    Urobilinogen 0.2 05/14/2019 08:14 PM    Nitrites NEGATIVE  05/14/2019 08:14 PM    Leukocyte Esterase NEGATIVE  05/14/2019 08:14 PM         Medications Reviewed:     Current Facility-Administered Medications   Medication Dose Route Frequency    enoxaparin (LOVENOX) injection 40 mg  40 mg SubCUTAneous Q24H    lactulose (CHRONULAC) 10 gram/15 mL solution 45 mL  30 g Oral DAILY PRN    mineral oil (FLEET) enema   Rectal PRN    dexamethasone (DECADRON) 4 mg/mL injection 2 mg  2 mg IntraVENous Q12H    aspirin chewable tablet 81 mg  81 mg Oral DAILY    tamsulosin (FLOMAX) capsule 0.4 mg  0.4 mg Oral DAILY    bisacodyl (DULCOLAX) suppository 10 mg  10 mg Rectal DAILY PRN    lidocaine (XYLOCAINE) 2 % viscous solution 15 mL  15 mL Other PRN    polyethylene glycol (MIRALAX) packet 17 g  17 g Oral DAILY    docusate sodium (COLACE) capsule 100 mg  100 mg Oral BID    senna (SENOKOT) tablet 17.2 mg  2 Tab Oral DAILY    lidocaine (LIDODERM) 5 % patch 2 Patch  2 Patch TransDERmal Q24H    pantoprazole (PROTONIX) 2 mg/mL oral suspension 40 mg  40 mg Per NG tube DAILY    acetaminophen (TYLENOL) solution 650 mg  650 mg Per NG tube Q6H    hydroCHLOROthiazide (HYDRODIURIL) 5 mg/mL oral suspension (compound) 25 mg  25 mg Per NG tube DAILY    levETIRAcetam (KEPPRA) 500 mg in 0.9% sodium chloride 100 mL IVPB  500 mg IntraVENous Q12H    meclizine (ANTIVERT) tablet 12.5 mg  12.5 mg Oral Q6H PRN    HYDROmorphone (PF) (DILAUDID) injection 0.5 mg  0.5 mg IntraVENous Q3H PRN    sodium chloride (NS) flush 5-40 mL  5-40 mL IntraVENous Q8H    sodium chloride (NS) flush 5-40 mL  5-40 mL IntraVENous PRN    naloxone (NARCAN) injection 0.4 mg  0.4 mg IntraVENous PRN    oxyCODONE IR (ROXICODONE) tablet 5 mg  5 mg Oral Q3H PRN    HYDROmorphone (DILAUDID) tablet 4 mg  4 mg Oral Q3H PRN    acetaminophen (TYLENOL) suppository 650 mg  650 mg Rectal Y0K PRN    folic acid (FOLVITE) tablet 1 mg  1 mg Oral DAILY    thiamine HCL (B-1) tablet 100 mg  100 mg Oral DAILY    pantothenic ac-min oil-pet,hyd (AQUAPHOR) 41 % ointment   Topical PRN    hydrALAZINE (APRESOLINE) 20 mg/mL injection 10 mg  10 mg IntraVENous Q4H PRN    metoprolol (LOPRESSOR) injection 2.5 mg  2.5 mg IntraVENous Q6H PRN    sodium chloride (NS) flush 5-40 mL  5-40 mL IntraVENous Q8H    sodium chloride (NS) flush 5-40 mL  5-40 mL IntraVENous PRN    ondansetron (ZOFRAN) injection 4 mg  4 mg IntraVENous Q4H PRN    naloxone (NARCAN) injection 0.4 mg  0.4 mg IntraVENous PRN    bisacodyl (DULCOLAX) tablet 5 mg  5 mg Oral DAILY PRN    acetaminophen (TYLENOL) tablet 650 mg  650 mg Oral Q4H PRN    prochlorperazine (COMPAZINE) tablet 10 mg  10 mg Oral Q6H PRN    phenol throat spray (CHLORASEPTIC) 1 Spray  1 Spray Oral PRN     ______________________________________________________________________  EXPECTED LENGTH OF STAY: 4d 21h  ACTUAL LENGTH OF STAY:          11                 Hallie Patino MD

## 2019-05-26 NOTE — PROGRESS NOTES
Dr. Angely Joshi informed of slight discolored area to right buttock/upper thigh. Wife concerned it is related to sunburn. Dr. Angely Joshi came and examined area and was informed wife would like a phone call to let her know what Dr. Angely Joshi thought of the discolored area. Patient continues on special bed and being repositioned. Aloe cream applied to sunburn area.

## 2019-05-26 NOTE — PROGRESS NOTES
Problem: Mobility Impaired (Adult and Pediatric)  Goal: *Acute Goals and Plan of Care (Insert Text)  Description  Physical Therapy Goals  Initiated 5/18/2019  1. Patient will move from supine to sit and sit to supine , scoot up and down and roll side to side in bed with minimal assistance/contact guard assist within 7 day(s). 2.  Patient will transfer from bed to chair and chair to bed with minimal assistance/contact guard assist using the least restrictive device within 7 day(s). 3.  Patient will perform sit to stand with minimal assistance/contact guard assist within 7 day(s). 4.  Patient will ambulate with minimal assistance/contact guard assist for 150 feet with the least restrictive device within 7 day(s). 5.  Patient will participate in a strengthening program and be independent within 7 days. Outcome: Progressing Towards Goal     PHYSICAL THERAPY TREATMENT  Patient: Thi Masters (40 y.o. male)  Date: 5/26/2019  Diagnosis: Alcohol intoxication ([facility] [address] [F10.929] Alcohol intoxication delirium ([facility] [address]  Procedure(s) (LRB):  C3-4 ANTERIOR CERVICAL DISCECTOMY FUSION (N/A) 5 Days Post-Op  Precautions: Fall, Spinal  Chart, physical therapy assessment, plan of care and goals were reviewed. ASSESSMENT:  PT approached patient sitting in chair, son present, anxious for therapy treatment. PT provided family training on use of gait belt and informed that patient will require 2 family members to walk with him- 1 for pole management, 1 to walk behind patient. Patient's son walked beside patient and assisted with pole management today. Patient ambulated 350' with no AD and CGA due to fluctuating speeds and trunk sway resulting in unsteadiness. Patient performed sit<>stand transfers with CGA due to cuing for technique and multiple attempts to arise. PT reviewed seated exercises with patient and son. PT reviewed proper technique for incentive spirometer with patient and son.  Patient left sitting in chair, call bell within reach, no complaints. Progression toward goals:  ?      Improving appropriately and progressing toward goals  ? Improving slowly and progressing toward goals  ? Not making progress toward goals and plan of care will be adjusted     PLAN:  Patient continues to benefit from skilled intervention to address the above impairments. Continue treatment per established plan of care. Discharge Recommendations:  Home Health  Further Equipment Recommendations for Discharge:  none at this time      SUBJECTIVE:   Patient stated \"I am excited for my therapy session. ?     OBJECTIVE DATA SUMMARY:   Critical Behavior:  Neurologic State: Alert  Orientation Level: Oriented X4  Cognition: Follows commands  Safety/Judgement: Insight into deficits  Functional Mobility Training:  Bed Mobility:  Log                   Transfers:  Sit to Stand: Contact guard assistance  Stand to Sit: Contact guard assistance                             Balance:  Sitting: Intact; With support  Standing: Impaired  Standing - Static: Fair  Standing - Dynamic : Fair  Ambulation/Gait Training:  Distance (ft): 350 Feet (ft)  Assistive Device: Gait belt  Ambulation - Level of Assistance: Contact guard assistance        Gait Abnormalities: Altered arm swing;Decreased step clearance;Shuffling gait        Base of Support: Narrowed(toe out)     Speed/Olive: Fluctuations; Shuffled                  Stairs: Therapeutic Exercises:   PT reviewed seated leg exercises for patient to perform multiple times per day. Pain:                    Activity Tolerance:   Patient reported baseline level of dizziness due to Meneire's. Please refer to the flowsheet for vital signs taken during this treatment. After treatment:   ?  Patient left in no apparent distress sitting up in chair  ? Patient left in no apparent distress in bed  ? Call bell left within reach  ? Nursing notified  ? Caregiver present  ?   Bed alarm activated    COMMUNICATION/COLLABORATION:   The patient?s plan of care was discussed with: Registered Nurse    Antonia Art, PT   Time Calculation: 26 mins

## 2019-05-26 NOTE — PROGRESS NOTES
Hospitalist Progress Note  Fred Dickens MD  Answering service: [phone] from in house phone  Cell: [phone]      Date of Service:  2019  NAME:  Asha Borden  :  1950  MRN:  [id number]      Admission Summary:   71-year-old man with a past medical history significant for hypertension, gastroesophageal reflux disease, Meniere's disease, who was in his usual state of health until the day of his presentation at the emergency room when the patient developed a change in mental status. The spouse stated that she was out of the house and when she came back found the patient laying prone in the backyard unresponsive. It is not clear how long the patient had been lying prone in the backyard. When the EMS arrived at the scene, it was reported that the patient was verbally responsive to the EMS crew that he had too much alcohol. The patient was administering alcohol by rectal enema stating that he wanted to try something new. The patient has no history of alcohol abuse according to his spouse, but today the patient had too much alcohol than usual.  The patient was brought to the emergency room for further evaluation. When the patient arrived at the emergency room, the patient remained lethargic, but easily arousable and also with verbal response. CT scan of the head was obtained, this was negative. The patient's alcohol level was 162. The patient was referred to the Hospitalist Service for evaluation for admission. No record of prior admission to this hospital    Interval history / Subjective:   F/U after cord compression, AMS   Suppository with success today, hands feeling better each day. Has a small area on his buttock that is red, and slightly discolored which has been ongoing.      Assessment & Plan:     B/L upper extremity weakness and tingling in arms with neck and shoulder pain POA but  more evident after he woke up later in hospitalization  - secondary to cervical cord compression from C3/C4 disc herniation  -s/p 5/21 Anterior cervical diskectomy, decompression, fusion to C3-4  -On Decadron, wean per NSGY  -NSGY following  - PT/OT    Delirium and fall , resolved on 5/18  -possibly secondary to seizure, resolved. -MRI shows hippocampus restricted diffusion , likley suggestive of seizure like activity ?  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  -Also initially though delirium compounded by alcohol intoxication, level 162  -EEG , no seizure , started on keppra by neuro though for now   -Echo EF 61-65% with no RWMA, no PFO  -Continue on Keppra till seen by Dr Dion Donovan  -The patient is back to baseline mental status  -Repeat MRI brain in 4 weeks, follow up outpatient with Dr. Dion Donovan in 4 weeks. Dysphagia - secondary to edema, R vocal cord paralysis, spinal injury   - SLP following, if no improvement will need to discuss PEG (will readdress early next week)  - ENT following,   - Dophoff placement 02/23  - Will need to consult GI next week if PEG is warranted. Seizure like activity   -Started on keppra by neuro   -MRI shows hippocampus restricted diffusion , likley suggestive of seizure like activity     Leukocytosis and lactic acidosis on admission- reactive 2/2 seizure, resolved. HTN - On prn hydralazine, continue HCTZ, BP goal in hospital less than 160  Alcohol intoxication and now risk of withdrawal Resolved     Neurogenic bladder from above - improving.- anderson removed 5/23, tamsulosin started.      PT/OT     Code status:  Full   DVT prophylaxis: heparin s/q when cleared by Surgery    Care Plan discussed with: Patient/Family  Disposition: TBD     Hospital Problems  Date Reviewed: 5/20/2019          Codes Class Noted POA    Spinal cord injury at C1-C4 level [facility]) ICD-10-CM: A85.977G  ICD-9-CM: 952.00  5/22/2019 Unknown        * (Principal) Alcohol intoxication delirium ([facility] [address] ICD-10-CM: F10.121  ICD-9-CM: 291.0  5/15/2019 Yes        Alcohol intoxication ([address] ICD-10-CM: X99.972  ICD-9-CM: 305.00  5/15/2019 Unknown                Review of Systems:   A comprehensive review of systems was negative except for that written in the HPI. Vital Signs:    Last 24hrs VS reviewed since prior progress note. Most recent are:  Visit Vitals  /80 (BP 1 Location: Left arm, BP Patient Position: At rest)   Pulse 62   Temp 97.7 °F (36.5 °C)   Resp 18   Ht 5' 8\" (1.727 m)   Wt 75.5 kg (166 lb 7.2 oz)   SpO2 97%   BMI 25.31 kg/m²         Intake/Output Summary (Last 24 hours) at 5/26/2019 1545  Last data filed at 5/26/2019 1411  Gross per 24 hour   Intake 120 ml   Output 2450 ml   Net -2330 ml        Physical Examination:             Constitutional:   Awake and conversant, NAD   ENT:  Oral mucous moist, oropharynx benign. + C collar in place, NG in place   Resp:  CTA bilaterally. No wheezing/rhonchi/rales. No accessory muscle use   CV:  Regular rhythm, normal rate, no murmurs, gallops, rubs    GI:  Soft, non distended, non tender. normoactive bowel sounds, no hepatosplenomegaly     Musculoskeletal:  No edema, warm, 2+ pulses throughout    Neurologic:  upper extremity 3-4/5 b/l             Data Review: All imaging and laboratory data reviewed. Labs:     Recent Labs     05/24/19  0257   WBC 9.3   HGB 15.1   HCT 46.6        Recent Labs     05/26/19  0432 05/24/19  0257   * 135*   K 3.7 4.1    102   CO2 32 27   BUN 16 20   CREA 0.56* 0.67*   * 154*   CA 8.2* 8.3*   MG 2.1 2.4   PHOS 3.4 3.1     No results for input(s): SGOT, GPT, ALT, AP, TBIL, TBILI, TP, ALB, GLOB, GGT, AML, LPSE in the last 72 hours. No lab exists for component: AMYP, HLPSE  No results for input(s): INR, PTP, APTT in the last 72 hours. No lab exists for component: INREXT, INREXT   No results for input(s): FE, TIBC, PSAT, FERR in the last 72 hours.    No results found for: FOL, RBCF   No results for input(s): PH, PCO2, PO2 in the last 72 hours. No results for input(s): CPK, CKNDX, TROIQ in the last 72 hours.     No lab exists for component: CPKMB  Lab Results   Component Value Date/Time    Cholesterol, total 129 05/15/2019 01:54 AM    HDL Cholesterol 54 05/15/2019 01:54 AM    LDL, calculated 65 05/15/2019 01:54 AM    Triglyceride 50 05/15/2019 01:54 AM    CHOL/HDL Ratio 2.4 05/15/2019 01:54 AM     Lab Results   Component Value Date/Time    Glucose (POC) 148 (H) 05/18/2019 04:47 PM    Glucose (POC) 170 (H) 05/18/2019 12:37 PM    Glucose (POC) 114 (H) 05/17/2019 04:42 PM    Glucose (POC) 82 05/17/2019 12:11 PM    Glucose (POC) 88 05/17/2019 06:25 AM     Lab Results   Component Value Date/Time    Color YELLOW/STRAW 05/14/2019 08:14 PM    Appearance CLEAR 05/14/2019 08:14 PM    Specific gravity 1.016 05/14/2019 08:14 PM    pH (UA) 5.5 05/14/2019 08:14 PM    Protein NEGATIVE  05/14/2019 08:14 PM    Glucose NEGATIVE  05/14/2019 08:14 PM    Ketone 15 (A) 05/14/2019 08:14 PM    Bilirubin NEGATIVE  05/14/2019 08:14 PM    Urobilinogen 0.2 05/14/2019 08:14 PM    Nitrites NEGATIVE  05/14/2019 08:14 PM    Leukocyte Esterase NEGATIVE  05/14/2019 08:14 PM         Medications Reviewed:     Current Facility-Administered Medications   Medication Dose Route Frequency    enoxaparin (LOVENOX) injection 40 mg  40 mg SubCUTAneous Q24H    lactulose (CHRONULAC) 10 gram/15 mL solution 45 mL  30 g Oral DAILY PRN    mineral oil (FLEET) enema   Rectal PRN    dexamethasone (DECADRON) 4 mg/mL injection 2 mg  2 mg IntraVENous Q12H    aspirin chewable tablet 81 mg  81 mg Oral DAILY    tamsulosin (FLOMAX) capsule 0.4 mg  0.4 mg Oral DAILY    bisacodyl (DULCOLAX) suppository 10 mg  10 mg Rectal DAILY PRN    lidocaine (XYLOCAINE) 2 % viscous solution 15 mL  15 mL Other PRN    polyethylene glycol (MIRALAX) packet 17 g  17 g Oral DAILY    docusate sodium (COLACE) capsule 100 mg  100 mg Oral BID    senna (SENOKOT) tablet 17.2 mg  2 Tab Oral DAILY    lidocaine (LIDODERM) 5 % patch 2 Patch  2 Patch TransDERmal Q24H    pantoprazole (PROTONIX) 2 mg/mL oral suspension 40 mg  40 mg Per NG tube DAILY    acetaminophen (TYLENOL) solution 650 mg  650 mg Per NG tube Q6H    hydroCHLOROthiazide (HYDRODIURIL) 5 mg/mL oral suspension (compound) 25 mg  25 mg Per NG tube DAILY    levETIRAcetam (KEPPRA) 500 mg in 0.9% sodium chloride 100 mL IVPB  500 mg IntraVENous Q12H    meclizine (ANTIVERT) tablet 12.5 mg  12.5 mg Oral Q6H PRN    HYDROmorphone (PF) (DILAUDID) injection 0.5 mg  0.5 mg IntraVENous Q3H PRN    sodium chloride (NS) flush 5-40 mL  5-40 mL IntraVENous Q8H    sodium chloride (NS) flush 5-40 mL  5-40 mL IntraVENous PRN    naloxone (NARCAN) injection 0.4 mg  0.4 mg IntraVENous PRN    oxyCODONE IR (ROXICODONE) tablet 5 mg  5 mg Oral Q3H PRN    HYDROmorphone (DILAUDID) tablet 4 mg  4 mg Oral Q3H PRN    acetaminophen (TYLENOL) suppository 650 mg  650 mg Rectal V3S PRN    folic acid (FOLVITE) tablet 1 mg  1 mg Oral DAILY    thiamine HCL (B-1) tablet 100 mg  100 mg Oral DAILY    pantothenic ac-min oil-pet,hyd (AQUAPHOR) 41 % ointment   Topical PRN    hydrALAZINE (APRESOLINE) 20 mg/mL injection 10 mg  10 mg IntraVENous Q4H PRN    metoprolol (LOPRESSOR) injection 2.5 mg  2.5 mg IntraVENous Q6H PRN    sodium chloride (NS) flush 5-40 mL  5-40 mL IntraVENous Q8H    sodium chloride (NS) flush 5-40 mL  5-40 mL IntraVENous PRN    ondansetron (ZOFRAN) injection 4 mg  4 mg IntraVENous Q4H PRN    naloxone (NARCAN) injection 0.4 mg  0.4 mg IntraVENous PRN    bisacodyl (DULCOLAX) tablet 5 mg  5 mg Oral DAILY PRN    acetaminophen (TYLENOL) tablet 650 mg  650 mg Oral Q4H PRN    prochlorperazine (COMPAZINE) tablet 10 mg  10 mg Oral Q6H PRN    phenol throat spray (CHLORASEPTIC) 1 Spray  1 Spray Oral PRN     ______________________________________________________________________  EXPECTED LENGTH OF STAY: 4d 21h  ACTUAL LENGTH OF STAY: Sasha Escobar MD

## 2019-05-27 PROCEDURE — 74011250636 HC RX REV CODE- 250/636: Performed by: HOSPITALIST

## 2019-05-27 PROCEDURE — 92526 ORAL FUNCTION THERAPY: CPT

## 2019-05-27 PROCEDURE — 65270000029 HC RM PRIVATE

## 2019-05-27 PROCEDURE — 74011000250 HC RX REV CODE- 250: Performed by: INTERNAL MEDICINE

## 2019-05-27 PROCEDURE — 74011000258 HC RX REV CODE- 258: Performed by: HOSPITALIST

## 2019-05-27 PROCEDURE — 74011250636 HC RX REV CODE- 250/636: Performed by: INTERNAL MEDICINE

## 2019-05-27 PROCEDURE — 74011250636 HC RX REV CODE- 250/636: Performed by: NEUROLOGICAL SURGERY

## 2019-05-27 PROCEDURE — 74011250637 HC RX REV CODE- 250/637: Performed by: INTERNAL MEDICINE

## 2019-05-27 PROCEDURE — 94760 N-INVAS EAR/PLS OXIMETRY 1: CPT

## 2019-05-27 RX ORDER — DEXAMETHASONE SODIUM PHOSPHATE 4 MG/ML
2 INJECTION, SOLUTION INTRA-ARTICULAR; INTRALESIONAL; INTRAMUSCULAR; INTRAVENOUS; SOFT TISSUE DAILY
Status: DISCONTINUED | OUTPATIENT
Start: 2019-05-28 | End: 2019-05-29

## 2019-05-27 RX ADMIN — ACETAMINOPHEN 650 MG: 650 SOLUTION ORAL at 13:06

## 2019-05-27 RX ADMIN — Medication 25 MG: at 01:00

## 2019-05-27 RX ADMIN — HYDROMORPHONE HYDROCHLORIDE 0.5 MG: 1 INJECTION, SOLUTION INTRAMUSCULAR; INTRAVENOUS; SUBCUTANEOUS at 22:04

## 2019-05-27 RX ADMIN — SODIUM CHLORIDE 500 MG: 900 INJECTION, SOLUTION INTRAVENOUS at 16:17

## 2019-05-27 RX ADMIN — ENOXAPARIN SODIUM 40 MG: 40 INJECTION SUBCUTANEOUS at 10:59

## 2019-05-27 RX ADMIN — ACETAMINOPHEN 650 MG: 650 SOLUTION ORAL at 18:36

## 2019-05-27 RX ADMIN — Medication 10 ML: at 22:04

## 2019-05-27 RX ADMIN — Medication 10 ML: at 00:01

## 2019-05-27 RX ADMIN — HYDROMORPHONE HYDROCHLORIDE 0.5 MG: 1 INJECTION, SOLUTION INTRAMUSCULAR; INTRAVENOUS; SUBCUTANEOUS at 05:13

## 2019-05-27 RX ADMIN — PANTOPRAZOLE SODIUM 40 MG: 40 TABLET, DELAYED RELEASE ORAL at 10:00

## 2019-05-27 RX ADMIN — DEXAMETHASONE SODIUM PHOSPHATE 2 MG: 4 INJECTION, SOLUTION INTRAMUSCULAR; INTRAVENOUS at 05:40

## 2019-05-27 RX ADMIN — SODIUM CHLORIDE 500 MG: 900 INJECTION, SOLUTION INTRAVENOUS at 05:45

## 2019-05-27 RX ADMIN — HYDROMORPHONE HYDROCHLORIDE 0.5 MG: 1 INJECTION, SOLUTION INTRAMUSCULAR; INTRAVENOUS; SUBCUTANEOUS at 00:00

## 2019-05-27 RX ADMIN — Medication 20 ML: at 05:40

## 2019-05-27 NOTE — PROGRESS NOTES
Hospitalist Progress Note  Gagan Beckman MD  Answering service: [phone] from in house phone  Cell: [phone]      Date of Service:  2019  NAME:  Lizy Mccormick  :  1950  MRN:  [id number]      Admission Summary:   54-year-old man with a past medical history significant for hypertension, gastroesophageal reflux disease, Meniere's disease, who was in his usual state of health until the day of his presentation at the emergency room when the patient developed a change in mental status. The spouse stated that she was out of the house and when she came back found the patient laying prone in the backyard unresponsive. It is not clear how long the patient had been lying prone in the backyard. When the EMS arrived at the scene, it was reported that the patient was verbally responsive to the EMS crew that he had too much alcohol. The patient was administering alcohol by rectal enema stating that he wanted to try something new. The patient has no history of alcohol abuse according to his spouse, but today the patient had too much alcohol than usual.  The patient was brought to the emergency room for further evaluation. When the patient arrived at the emergency room, the patient remained lethargic, but easily arousable and also with verbal response. CT scan of the head was obtained, this was negative. The patient's alcohol level was 162. The patient was referred to the Hospitalist Service for evaluation for admission. No record of prior admission to this hospital    Interval history / Subjective:   F/U after cord compression, AMS   Frustrated a bit with inability to use his hands, reading newspaper, etc. Also having some difficulty with his C collar. Feels like his swallowing is better however.  Plan for MBS tomorrow      Assessment & Plan:     B/L upper extremity weakness and tingling in arms with neck and shoulder pain POA but  more evident after he woke up later in hospitalization  - secondary to cervical cord compression from C3/C4 disc herniation  - s/p 5/21 Anterior cervical diskectomy, decompression, fusion to C3-4  - On Decadron, wean per NSGY (weaned to daily dose 5/27)  - NSGY following  - PT/OT    Delirium and fall , resolved on 5/18  -possibly secondary to seizure, resolved. -MRI shows hippocampus restricted diffusion , likley suggestive of seizure like activity ?  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  -Also initially though delirium compounded by alcohol intoxication, level 162  -EEG , no seizure , started on keppra by neuro though for now   -Echo EF 61-65% with no RWMA, no PFO  -Continue on Keppra till seen by Dr Ubaldo Saldivar  -The patient is back to baseline mental status  -Repeat MRI brain in 4 weeks, follow up outpatient with Dr. Ubaldo Saldivar in 4 weeks. Dysphagia - secondary to edema, R vocal cord paralysis, spinal injury   - SLP following, if no improvement will need to discuss PEG (will readdress early next week)  - ENT following,   - Dophoff placement 02/23  - MBS tomorrow 5/28, then if failed will need to pursue PEG    Seizure like activity   -Started on keppra by neuro   -MRI shows hippocampus restricted diffusion , likley suggestive of seizure like activity     Leukocytosis and lactic acidosis on admission- reactive 2/2 seizure, resolved. HTN - On prn hydralazine, continue HCTZ, BP goal in hospital less than 160  Alcohol intoxication and now risk of withdrawal Resolved     Neurogenic bladder from above - improving.- anderson removed 5/23, tamsulosin started.      PT/OT     Code status:  Full   DVT prophylaxis: heparin s/q when cleared by Surgery    Care Plan discussed with: Patient/Family  Disposition: TBD     Hospital Problems  Date Reviewed: 5/20/2019          Codes Class Noted POA    Spinal cord injury at C1-C4 level [facility]) ICD-10-CM: D25.081D  ICD-9-CM: 952.00  5/22/2019 Unknown        * (Principal) Alcohol intoxication delirium ([facility] ICD-10-CM: F10.121  ICD-9-CM: 291.0  5/15/2019 Yes        Alcohol intoxication ([facility] ICD-10-CM: O24.397  ICD-9-CM: 305.00  5/15/2019 Unknown                Review of Systems:   A comprehensive review of systems was negative except for that written in the HPI. Vital Signs:    Last 24hrs VS reviewed since prior progress note. Most recent are:  Visit Vitals  /89   Pulse 69   Temp 98.1 °F (36.7 °C)   Resp 17   Ht 5' 8\" (1.727 m)   Wt 68.8 kg (151 lb 9.6 oz)   SpO2 97%   BMI 23.05 kg/m²         Intake/Output Summary (Last 24 hours) at 5/27/2019 1336  Last data filed at 5/27/2019 0645  Gross per 24 hour   Intake 120 ml   Output 3150 ml   Net -3030 ml        Physical Examination:             Constitutional:   Awake and conversant, NAD   ENT:  Oral mucous moist, oropharynx benign. + C collar in place, NG in place   Resp:  CTA bilaterally. No wheezing/rhonchi/rales. No accessory muscle use   CV:  Regular rhythm, normal rate, no murmurs, gallops, rubs    GI:  Soft, non distended, non tender. normoactive bowel sounds, no hepatosplenomegaly     Musculoskeletal:  No edema, warm, 2+ pulses throughout    Neurologic:  upper extremity 3-4/5 b/l - slowly improving            Data Review: All imaging and laboratory data reviewed. Labs:     No results for input(s): WBC, HGB, HCT, PLT, HGBEXT, HCTEXT, PLTEXT, HGBEXT, HCTEXT, PLTEXT in the last 72 hours. Recent Labs     05/26/19  0432   *   K 3.7      CO2 32   BUN 16   CREA 0.56*   *   CA 8.2*   MG 2.1   PHOS 3.4     No results for input(s): SGOT, GPT, ALT, AP, TBIL, TBILI, TP, ALB, GLOB, GGT, AML, LPSE in the last 72 hours. No lab exists for component: AMYP, HLPSE  No results for input(s): INR, PTP, APTT in the last 72 hours. No lab exists for component: INREXT, INREXT   No results for input(s): FE, TIBC, PSAT, FERR in the last 72 hours.    No results found for: FOL, RBCF   No results for input(s): PH, PCO2, PO2 in the last 72 hours. No results for input(s): CPK, CKNDX, TROIQ in the last 72 hours.     No lab exists for component: CPKMB  Lab Results   Component Value Date/Time    Cholesterol, total 129 05/15/2019 01:54 AM    HDL Cholesterol 54 05/15/2019 01:54 AM    LDL, calculated 65 05/15/2019 01:54 AM    Triglyceride 50 05/15/2019 01:54 AM    CHOL/HDL Ratio 2.4 05/15/2019 01:54 AM     Lab Results   Component Value Date/Time    Glucose (POC) 148 (H) 05/18/2019 04:47 PM    Glucose (POC) 170 (H) 05/18/2019 12:37 PM    Glucose (POC) 114 (H) 05/17/2019 04:42 PM    Glucose (POC) 82 05/17/2019 12:11 PM    Glucose (POC) 88 05/17/2019 06:25 AM     Lab Results   Component Value Date/Time    Color YELLOW/STRAW 05/14/2019 08:14 PM    Appearance CLEAR 05/14/2019 08:14 PM    Specific gravity 1.016 05/14/2019 08:14 PM    pH (UA) 5.5 05/14/2019 08:14 PM    Protein NEGATIVE  05/14/2019 08:14 PM    Glucose NEGATIVE  05/14/2019 08:14 PM    Ketone 15 (A) 05/14/2019 08:14 PM    Bilirubin NEGATIVE  05/14/2019 08:14 PM    Urobilinogen 0.2 05/14/2019 08:14 PM    Nitrites NEGATIVE  05/14/2019 08:14 PM    Leukocyte Esterase NEGATIVE  05/14/2019 08:14 PM         Medications Reviewed:     Current Facility-Administered Medications   Medication Dose Route Frequency    enoxaparin (LOVENOX) injection 40 mg  40 mg SubCUTAneous Q24H    lactulose (CHRONULAC) 10 gram/15 mL solution 45 mL  30 g Oral DAILY PRN    mineral oil (FLEET) enema   Rectal PRN    dexamethasone (DECADRON) 4 mg/mL injection 2 mg  2 mg IntraVENous Q12H    aspirin chewable tablet 81 mg  81 mg Oral DAILY    tamsulosin (FLOMAX) capsule 0.4 mg  0.4 mg Oral DAILY    bisacodyl (DULCOLAX) suppository 10 mg  10 mg Rectal DAILY PRN    lidocaine (XYLOCAINE) 2 % viscous solution 15 mL  15 mL Other PRN    polyethylene glycol (MIRALAX) packet 17 g  17 g Oral DAILY    docusate sodium (COLACE) capsule 100 mg  100 mg Oral BID    senna (SENOKOT) tablet 17.2 mg  2 Tab Oral DAILY    lidocaine (LIDODERM) 5 % patch 2 Patch  2 Patch TransDERmal Q24H    pantoprazole (PROTONIX) 2 mg/mL oral suspension 40 mg  40 mg Per NG tube DAILY    acetaminophen (TYLENOL) solution 650 mg  650 mg Per NG tube Q6H    hydroCHLOROthiazide (HYDRODIURIL) 5 mg/mL oral suspension (compound) 25 mg  25 mg Per NG tube DAILY    levETIRAcetam (KEPPRA) 500 mg in 0.9% sodium chloride 100 mL IVPB  500 mg IntraVENous Q12H    meclizine (ANTIVERT) tablet 12.5 mg  12.5 mg Oral Q6H PRN    HYDROmorphone (PF) (DILAUDID) injection 0.5 mg  0.5 mg IntraVENous Q3H PRN    sodium chloride (NS) flush 5-40 mL  5-40 mL IntraVENous Q8H    sodium chloride (NS) flush 5-40 mL  5-40 mL IntraVENous PRN    naloxone (NARCAN) injection 0.4 mg  0.4 mg IntraVENous PRN    oxyCODONE IR (ROXICODONE) tablet 5 mg  5 mg Oral Q3H PRN    HYDROmorphone (DILAUDID) tablet 4 mg  4 mg Oral Q3H PRN    acetaminophen (TYLENOL) suppository 650 mg  650 mg Rectal Y1R PRN    folic acid (FOLVITE) tablet 1 mg  1 mg Oral DAILY    thiamine HCL (B-1) tablet 100 mg  100 mg Oral DAILY    pantothenic ac-min oil-pet,hyd (AQUAPHOR) 41 % ointment   Topical PRN    hydrALAZINE (APRESOLINE) 20 mg/mL injection 10 mg  10 mg IntraVENous Q4H PRN    metoprolol (LOPRESSOR) injection 2.5 mg  2.5 mg IntraVENous Q6H PRN    sodium chloride (NS) flush 5-40 mL  5-40 mL IntraVENous Q8H    sodium chloride (NS) flush 5-40 mL  5-40 mL IntraVENous PRN    ondansetron (ZOFRAN) injection 4 mg  4 mg IntraVENous Q4H PRN    naloxone (NARCAN) injection 0.4 mg  0.4 mg IntraVENous PRN    bisacodyl (DULCOLAX) tablet 5 mg  5 mg Oral DAILY PRN    acetaminophen (TYLENOL) tablet 650 mg  650 mg Oral Q4H PRN    prochlorperazine (COMPAZINE) tablet 10 mg  10 mg Oral Q6H PRN    phenol throat spray (CHLORASEPTIC) 1 Spray  1 Spray Oral PRN     ______________________________________________________________________  EXPECTED LENGTH OF STAY: 4d 21h  ACTUAL LENGTH OF STAY: Ary Hawley MD

## 2019-05-27 NOTE — PROGRESS NOTES
Problem: Dysphagia (Adult)  Goal: *Acute Goals and Plan of Care (Insert Text)  Description  Speech pathology goals  Initiated 5/16/2019; re-evaluation 5/23/2019   1. Patient will participate in re-evaluation of swallow function within 7 days. Continue 5/23/2019    2) MBS by 5-30-19   Outcome: Progressing Towards Goal   SPEECH LANGUAGE PATHOLOGY DYSPHAGIA TREATMENT  Patient: Junior Peterson (37 y.o. male)  Date: 5/27/2019  Diagnosis: Alcohol intoxication ([facility] [address] [F10.929] Alcohol intoxication delirium ([facility] [address]  Procedure(s) (LRB):  C3-4 ANTERIOR CERVICAL DISCECTOMY FUSION (N/A) 6 Days Post-Op  Precautions: aspiration Fall, Spinal    ASSESSMENT:  Patient with improved voice quality and significant progress with improved strength of swallow on bedside eval.  He is ready for MBS. Still on decadron BID. Still has dubhoff TF. Progression toward goals:  ?         Improving appropriately and progressing toward goals  ? Improving slowly and progressing toward goals  ? Not making progress toward goals and plan of care will be adjusted     PLAN:  Recommendations and Planned Interventions:  MBS tomorrow. NPO until then. Patient continues to benefit from skilled intervention to address the above impairments. Continue treatment per established plan of care. Discharge Recommendations: To Be Determined     SUBJECTIVE:   Patient stated ?so are you going to do an endoscopy or what??.-explained MBS procedure to patient . OBJECTIVE:   Cognitive and Communication Status:  Neurologic State: Alert  Orientation Level: Oriented to person, Oriented to place, Oriented to situation, Oriented to time  Cognition: Follows commands  Perception: ([place])  Perseveration: No perseveration noted  Safety/Judgement: Insight into deficits  Dysphagia Treatment:  Oral Assessment:     P.O. Trials:  Patient Position: upright in bed. had hard aspen collar on  Vocal quality prior to P.O.: (voice only with mild cul-de-sac resonance. patient and wife report much improvement in voice)  Consistency Presented: Thin liquid;Puree; Ice chips(1 sip/bite of each)  How Presented: Spoon;Straw;Self-fed/presented   ORAL PHASE:   Bolus Acceptance: (only 1 sip at t time)  Bolus Formation/Control: No impairment     Propulsion: No impairment  Oral Residue: None  PHARYNGEAL PHASE:   Initiation of Swallow: Delayed (# of seconds)(mild)  Laryngeal Elevation: Weak(difficult to full palpate due to aspen . had at least medium laryngeal elevation and excursion)  Aspiration Signs/Symptoms: (throat clear x1 s/p first sip of thins. otherwise [facility])     Patient is aware that if he fails MBS, he will need PEG. Exercises:  Laryngeal Exercises:                                                                                                                                   Pain:           After treatment:   ?              Patient left in no apparent distress sitting up in chair  ? Patient left in no apparent distress in bed  ? Call bell left within reach  ? Nursing notified  ? Caregiver present  ? Bed alarm activated    COMMUNICATION/EDUCATION:   Patient was educated regarding His deficit(s) of dysphagia  as this relates to His diagnosis of ACDF  and encephalophaty . Also paralyzed R vocal cord. Marcela Luque He demonstrated Good understanding as evidenced by discussion. Wife present as well. .    The patient?s plan of care including recommendations, planned interventions, and recommended diet changes were discussed with: Registered Nurse. ? Posted safety precautions in patient's room.     Sueann Kawasaki, SLP  Time Calculation: 10 mins

## 2019-05-28 ENCOUNTER — APPOINTMENT (OUTPATIENT)
Dept: GENERAL RADIOLOGY | Age: 69
DRG: 028 | End: 2019-05-28
Attending: INTERNAL MEDICINE
Payer: MEDICARE

## 2019-05-28 LAB
ANION GAP SERPL CALC-SCNC: 6 MMOL/L (ref 5–15)
BUN SERPL-MCNC: 17 MG/DL (ref 6–20)
BUN/CREAT SERPL: 28 (ref 12–20)
CALCIUM SERPL-MCNC: 8.6 MG/DL (ref 8.5–10.1)
CHLORIDE SERPL-SCNC: 102 MMOL/L (ref 97–108)
CO2 SERPL-SCNC: 30 MMOL/L (ref 21–32)
CREAT SERPL-MCNC: 0.6 MG/DL (ref 0.7–1.3)
GLUCOSE BLD STRIP.AUTO-MCNC: 119 MG/DL (ref 65–100)
GLUCOSE SERPL-MCNC: 116 MG/DL (ref 65–100)
MAGNESIUM SERPL-MCNC: 2.1 MG/DL (ref 1.6–2.4)
PHOSPHATE SERPL-MCNC: 4 MG/DL (ref 2.6–4.7)
POTASSIUM SERPL-SCNC: 3.6 MMOL/L (ref 3.5–5.1)
SERVICE CMNT-IMP: ABNORMAL
SODIUM SERPL-SCNC: 138 MMOL/L (ref 136–145)

## 2019-05-28 PROCEDURE — 97116 GAIT TRAINING THERAPY: CPT

## 2019-05-28 PROCEDURE — 74230 X-RAY XM SWLNG FUNCJ C+: CPT

## 2019-05-28 PROCEDURE — 65270000029 HC RM PRIVATE

## 2019-05-28 PROCEDURE — 92611 MOTION FLUOROSCOPY/SWALLOW: CPT | Performed by: SPEECH-LANGUAGE PATHOLOGIST

## 2019-05-28 PROCEDURE — 36415 COLL VENOUS BLD VENIPUNCTURE: CPT

## 2019-05-28 PROCEDURE — 83735 ASSAY OF MAGNESIUM: CPT

## 2019-05-28 PROCEDURE — 74011000250 HC RX REV CODE- 250: Performed by: INTERNAL MEDICINE

## 2019-05-28 PROCEDURE — 97535 SELF CARE MNGMENT TRAINING: CPT

## 2019-05-28 PROCEDURE — 94760 N-INVAS EAR/PLS OXIMETRY 1: CPT

## 2019-05-28 PROCEDURE — 74011250636 HC RX REV CODE- 250/636: Performed by: INTERNAL MEDICINE

## 2019-05-28 PROCEDURE — 74011250637 HC RX REV CODE- 250/637: Performed by: INTERNAL MEDICINE

## 2019-05-28 PROCEDURE — 74011000258 HC RX REV CODE- 258: Performed by: HOSPITALIST

## 2019-05-28 PROCEDURE — 74011250637 HC RX REV CODE- 250/637: Performed by: NEUROLOGICAL SURGERY

## 2019-05-28 PROCEDURE — 80048 BASIC METABOLIC PNL TOTAL CA: CPT

## 2019-05-28 PROCEDURE — 84100 ASSAY OF PHOSPHORUS: CPT

## 2019-05-28 PROCEDURE — 74011250636 HC RX REV CODE- 250/636: Performed by: HOSPITALIST

## 2019-05-28 PROCEDURE — 82962 GLUCOSE BLOOD TEST: CPT

## 2019-05-28 PROCEDURE — 74011250636 HC RX REV CODE- 250/636: Performed by: NEUROLOGICAL SURGERY

## 2019-05-28 RX ADMIN — HYDROMORPHONE HYDROCHLORIDE 0.5 MG: 1 INJECTION, SOLUTION INTRAMUSCULAR; INTRAVENOUS; SUBCUTANEOUS at 05:10

## 2019-05-28 RX ADMIN — HYDROMORPHONE HYDROCHLORIDE 0.5 MG: 1 INJECTION, SOLUTION INTRAMUSCULAR; INTRAVENOUS; SUBCUTANEOUS at 09:59

## 2019-05-28 RX ADMIN — HYDROMORPHONE HYDROCHLORIDE 0.5 MG: 1 INJECTION, SOLUTION INTRAMUSCULAR; INTRAVENOUS; SUBCUTANEOUS at 17:01

## 2019-05-28 RX ADMIN — DOCUSATE SODIUM 100 MG: 100 CAPSULE, LIQUID FILLED ORAL at 18:55

## 2019-05-28 RX ADMIN — Medication 10 ML: at 05:10

## 2019-05-28 RX ADMIN — CHLORASEPTIC 1 SPRAY: 1.5 LIQUID ORAL at 22:24

## 2019-05-28 RX ADMIN — OXYCODONE HYDROCHLORIDE 5 MG: 5 TABLET ORAL at 22:20

## 2019-05-28 RX ADMIN — ACETAMINOPHEN 650 MG: 650 SOLUTION ORAL at 06:45

## 2019-05-28 RX ADMIN — SODIUM CHLORIDE 500 MG: 900 INJECTION, SOLUTION INTRAVENOUS at 03:45

## 2019-05-28 RX ADMIN — HYDROMORPHONE HYDROCHLORIDE 0.5 MG: 1 INJECTION, SOLUTION INTRAMUSCULAR; INTRAVENOUS; SUBCUTANEOUS at 13:47

## 2019-05-28 RX ADMIN — SODIUM CHLORIDE 500 MG: 900 INJECTION, SOLUTION INTRAVENOUS at 16:37

## 2019-05-28 RX ADMIN — ACETAMINOPHEN 650 MG: 650 SOLUTION ORAL at 01:03

## 2019-05-28 RX ADMIN — DEXAMETHASONE SODIUM PHOSPHATE 2 MG: 4 INJECTION, SOLUTION INTRAMUSCULAR; INTRAVENOUS at 09:59

## 2019-05-28 RX ADMIN — Medication 10 ML: at 22:20

## 2019-05-28 RX ADMIN — Medication 10 ML: at 03:45

## 2019-05-28 RX ADMIN — ENOXAPARIN SODIUM 40 MG: 40 INJECTION SUBCUTANEOUS at 12:48

## 2019-05-28 RX ADMIN — ACETAMINOPHEN 650 MG: 650 SOLUTION ORAL at 18:54

## 2019-05-28 NOTE — PROGRESS NOTES
Physical Therapy  5/28/2019    Attempted to see pt for PT. Pt currently GUILLE for MBS. Will check back at later time this afternoon as able and appropriate. 1520 Attempted treatment this afternoon after MBS. Pt received sitting in chair at bedside, wife present. NP, and Neurologist in several minutes after to speak w/ wife and pt. SLP in also. Will check back at later time as able and appropriate.     Deepti Means, PTA

## 2019-05-28 NOTE — PROGRESS NOTES
Hospitalist Progress Note  Damien Ronquillo MD  Answering service: [phone] OR 36 from in house phone  Cell: [phone]      Date of Service:  2019  NAME:  Josh Vargas  :  1950  MRN:  [id number]      Admission Summary:   71-year-old man with a past medical history significant for hypertension, gastroesophageal reflux disease, Meniere's disease, who was in his usual state of health until the day of his presentation at the emergency room when the patient developed a change in mental status. The spouse stated that she was out of the house and when she came back found the patient laying prone in the backyard unresponsive. It is not clear how long the patient had been lying prone in the backyard. When the EMS arrived at the scene, it was reported that the patient was verbally responsive to the EMS crew that he had too much alcohol. The patient was administering alcohol by rectal enema stating that he wanted to try something new. The patient has no history of alcohol abuse according to his spouse, but today the patient had too much alcohol than usual.  The patient was brought to the emergency room for further evaluation. When the patient arrived at the emergency room, the patient remained lethargic, but easily arousable and also with verbal response. CT scan of the head was obtained, this was negative. The patient's alcohol level was 162. The patient was referred to the Hospitalist Service for evaluation for admission. No record of prior admission to this hospital    Interval history / Subjective:   F/U after cord compression, AMS   MBS today. Pt is hopeful. Feels like his throat is moving better when he swallows his secretions. Wife remains concerned about auth from [facility]. Refusing to pick any additional facilities. No complaints otherwise.        Assessment & Plan:     B/L upper extremity weakness and tingling in arms with neck and shoulder pain POA but  more evident after he woke up later in hospitalization  - secondary to cervical cord compression from C3/C4 disc herniation  - s/p 5/21 Anterior cervical diskectomy, decompression, fusion to C3-4  - On Decadron, wean per NSGY (weaned to daily dose 5/27)  - NSGY following  - PT/OT, patient would greatly benefit from intensive inpatient rehab    Delirium and fall , resolved on 5/18  -possibly secondary to seizure, resolved. -MRI shows hippocampus restricted diffusion , likley suggestive of seizure like activity ?  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  -Also initially though delirium compounded by alcohol intoxication, level 162  -EEG , no seizure , started on keppra by neuro though for now   -Echo EF 61-65% with no RWMA, no PFO  -Continue on Keppra till seen by Dr Renetta Orantes  -The patient is back to baseline mental status  -Repeat MRI brain in 4 weeks, follow up outpatient with Dr. Renetta Orantes in 4 weeks. Dysphagia - secondary to edema, R vocal cord paralysis, spinal injury   - SLP following, if no improvement will need to discuss PEG (will readdress early next week)  - ENT following,   - Dophoff placement 02/23  - MBS today 5/28, then if failed will need to pursue PEG    Seizure like activity   -Started on keppra by neuro   -MRI shows hippocampus restricted diffusion , likley suggestive of seizure like activity     Leukocytosis and lactic acidosis on admission- reactive 2/2 seizure, resolved. HTN - On prn hydralazine, continue HCTZ, BP goal in hospital less than 160  Alcohol intoxication and now risk of withdrawal Resolved     Neurogenic bladder from above - improving.- anderson removed 5/23, tamsulosin started.      PT/OT     Code status:  Full   DVT prophylaxis: heparin s/q when cleared by Surgery    Care Plan discussed with: Patient/Family  Disposition: TBD     Hospital Problems  Date Reviewed: 5/20/2019          Codes Class Noted POA    Spinal cord injury at C1-C4 level [facility]) ICD-10-CM: S14.101A  ICD-9-CM: 952.00  5/22/2019 Unknown        * (Principal) Alcohol intoxication delirium ([facility] ICD-10-CM: F10.121  ICD-9-CM: 291.0  5/15/2019 Yes        Alcohol intoxication ([facility] ICD-10-CM: V92.759  ICD-9-CM: 305.00  5/15/2019 Unknown                Review of Systems:   A comprehensive review of systems was negative except for that written in the HPI. Vital Signs:    Last 24hrs VS reviewed since prior progress note. Most recent are:  Visit Vitals  /71 (BP 1 Location: Left arm, BP Patient Position: At rest)   Pulse 61   Temp 97.1 °F (36.2 °C)   Resp 16   Ht 5' 8\" (1.727 m)   Wt 69.3 kg (152 lb 12.5 oz)   SpO2 96%   BMI 23.23 kg/m²         Intake/Output Summary (Last 24 hours) at 5/28/2019 1428  Last data filed at 5/28/2019 8809  Gross per 24 hour   Intake 240 ml   Output 1500 ml   Net -1260 ml        Physical Examination:             Constitutional:   Awake and conversant, NAD   ENT:  Oral mucous moist, oropharynx benign. + C collar in place, NG in place   Resp:  CTA bilaterally. No wheezing/rhonchi/rales. No accessory muscle use   CV:  Regular rhythm, normal rate, no murmurs, gallops, rubs    GI:  Soft, non distended, non tender. normoactive bowel sounds, no hepatosplenomegaly     Musculoskeletal:  No edema, warm, 2+ pulses throughout    Neurologic:  upper extremity 3-4/5 b/l - slowly improving            Data Review: All imaging and laboratory data reviewed. Labs:     No results for input(s): WBC, HGB, HCT, PLT, HGBEXT, HCTEXT, PLTEXT, HGBEXT, HCTEXT, PLTEXT in the last 72 hours. Recent Labs     05/28/19  0336 05/26/19  0432    135*   K 3.6 3.7    100   CO2 30 32   BUN 17 16   CREA 0.60* 0.56*   * 128*   CA 8.6 8.2*   MG 2.1 2.1   PHOS 4.0 3.4     No results for input(s): SGOT, GPT, ALT, AP, TBIL, TBILI, TP, ALB, GLOB, GGT, AML, LPSE in the last 72 hours.     No lab exists for component: AMYP, HLPSE  No results for input(s): INR, PTP, APTT in the last 72 hours. No lab exists for component: INREXT, INREXT   No results for input(s): FE, TIBC, PSAT, FERR in the last 72 hours. No results found for: FOL, RBCF   No results for input(s): PH, PCO2, PO2 in the last 72 hours. No results for input(s): CPK, CKNDX, TROIQ in the last 72 hours.     No lab exists for component: CPKMB  Lab Results   Component Value Date/Time    Cholesterol, total 129 05/15/2019 01:54 AM    HDL Cholesterol 54 05/15/2019 01:54 AM    LDL, calculated 65 05/15/2019 01:54 AM    Triglyceride 50 05/15/2019 01:54 AM    CHOL/HDL Ratio 2.4 05/15/2019 01:54 AM     Lab Results   Component Value Date/Time    Glucose (POC) 119 (H) 05/28/2019 06:56 AM    Glucose (POC) 148 (H) 05/18/2019 04:47 PM    Glucose (POC) 170 (H) 05/18/2019 12:37 PM    Glucose (POC) 114 (H) 05/17/2019 04:42 PM    Glucose (POC) 82 05/17/2019 12:11 PM     Lab Results   Component Value Date/Time    Color YELLOW/STRAW 05/14/2019 08:14 PM    Appearance CLEAR 05/14/2019 08:14 PM    Specific gravity 1.016 05/14/2019 08:14 PM    pH (UA) 5.5 05/14/2019 08:14 PM    Protein NEGATIVE  05/14/2019 08:14 PM    Glucose NEGATIVE  05/14/2019 08:14 PM    Ketone 15 (A) 05/14/2019 08:14 PM    Bilirubin NEGATIVE  05/14/2019 08:14 PM    Urobilinogen 0.2 05/14/2019 08:14 PM    Nitrites NEGATIVE  05/14/2019 08:14 PM    Leukocyte Esterase NEGATIVE  05/14/2019 08:14 PM         Medications Reviewed:     Current Facility-Administered Medications   Medication Dose Route Frequency    dexamethasone (DECADRON) 4 mg/mL injection 2 mg  2 mg IntraVENous DAILY    enoxaparin (LOVENOX) injection 40 mg  40 mg SubCUTAneous Q24H    lactulose (CHRONULAC) 10 gram/15 mL solution 45 mL  30 g Oral DAILY PRN    mineral oil (FLEET) enema   Rectal PRN    aspirin chewable tablet 81 mg  81 mg Oral DAILY    tamsulosin (FLOMAX) capsule 0.4 mg  0.4 mg Oral DAILY    bisacodyl (DULCOLAX) suppository 10 mg  10 mg Rectal DAILY PRN    lidocaine (XYLOCAINE) 2 % viscous solution 15 mL  15 mL Other PRN    polyethylene glycol (MIRALAX) packet 17 g  17 g Oral DAILY    docusate sodium (COLACE) capsule 100 mg  100 mg Oral BID    senna (SENOKOT) tablet 17.2 mg  2 Tab Oral DAILY    lidocaine (LIDODERM) 5 % patch 2 Patch  2 Patch TransDERmal Q24H    pantoprazole (PROTONIX) 2 mg/mL oral suspension 40 mg  40 mg Per NG tube DAILY    acetaminophen (TYLENOL) solution 650 mg  650 mg Per NG tube Q6H    hydroCHLOROthiazide (HYDRODIURIL) 5 mg/mL oral suspension (compound) 25 mg  25 mg Per NG tube DAILY    levETIRAcetam (KEPPRA) 500 mg in 0.9% sodium chloride 100 mL IVPB  500 mg IntraVENous Q12H    meclizine (ANTIVERT) tablet 12.5 mg  12.5 mg Oral Q6H PRN    HYDROmorphone (PF) (DILAUDID) injection 0.5 mg  0.5 mg IntraVENous Q3H PRN    sodium chloride (NS) flush 5-40 mL  5-40 mL IntraVENous Q8H    sodium chloride (NS) flush 5-40 mL  5-40 mL IntraVENous PRN    naloxone (NARCAN) injection 0.4 mg  0.4 mg IntraVENous PRN    oxyCODONE IR (ROXICODONE) tablet 5 mg  5 mg Oral Q3H PRN    HYDROmorphone (DILAUDID) tablet 4 mg  4 mg Oral Q3H PRN    acetaminophen (TYLENOL) suppository 650 mg  650 mg Rectal N6C PRN    folic acid (FOLVITE) tablet 1 mg  1 mg Oral DAILY    thiamine HCL (B-1) tablet 100 mg  100 mg Oral DAILY    pantothenic ac-min oil-pet,hyd (AQUAPHOR) 41 % ointment   Topical PRN    hydrALAZINE (APRESOLINE) 20 mg/mL injection 10 mg  10 mg IntraVENous Q4H PRN    metoprolol (LOPRESSOR) injection 2.5 mg  2.5 mg IntraVENous Q6H PRN    sodium chloride (NS) flush 5-40 mL  5-40 mL IntraVENous Q8H    sodium chloride (NS) flush 5-40 mL  5-40 mL IntraVENous PRN    ondansetron (ZOFRAN) injection 4 mg  4 mg IntraVENous Q4H PRN    naloxone (NARCAN) injection 0.4 mg  0.4 mg IntraVENous PRN    bisacodyl (DULCOLAX) tablet 5 mg  5 mg Oral DAILY PRN    acetaminophen (TYLENOL) tablet 650 mg  650 mg Oral Q4H PRN    prochlorperazine (COMPAZINE) tablet 10 mg  10 mg Oral Q6H PRN    phenol throat spray (CHLORASEPTIC) 1 Spray  1 Spray Oral PRN     ______________________________________________________________________  EXPECTED LENGTH OF STAY: 4d 21h  ACTUAL LENGTH OF STAY:          13                 Damien Ronquillo MD

## 2019-05-28 NOTE — PROGRESS NOTES
PHILIP met with pt and his wife this morning. We do not have an auth yet for this pt to go to [address] [place] [address]. PHILIP spoke with Karime Conway for [facility] and she will inform this CM when they have auth. Will follow.  Amarilis Chery

## 2019-05-28 NOTE — PROGRESS NOTES
Problem: Self Care Deficits Care Plan (Adult)  Goal: *Acute Goals and Plan of Care (Insert Text)  Description  Occupational Therapy Goals  Initiated 5/22/2019  1. Patient will perform upper body dressing with supervision/set-up within 7 day(s) using compensatory strategies PRN. 2.  Patient will perform lower body dressing with minimal assistance/contact guard assist within 7 day(s) using compensatory strategies PRN. 3.  Patient will perform grooming with minimal assistance/contact guard assist within 7 day(s) using compensatory strategies PRN. 4.  Patient will perform toilet transfers with minimal assistance/contact guard assist within 7 day(s). 5.  Patient will perform all aspects of toileting with minimal assistance/contact guard assist within 7 day(s). 6.  Patient will demonstrate ability to perform self-ROM for R and L digit/wrist extension with minimal assistance within 7 day(s). Outcome: Progressing Towards Goal   OCCUPATIONAL THERAPY TREATMENT  Patient: Luis Celis (62 y.o. male)  Date: 5/28/2019  Diagnosis: Alcohol intoxication ([facility] [F10.929] Alcohol intoxication delirium ([facility]  Procedure(s) (LRB):  C3-4 ANTERIOR CERVICAL DISCECTOMY FUSION (N/A) 7 Days Post-Op  Precautions: Fall, Spinal  Chart, occupational therapy assessment, plan of care, and goals were reviewed. ASSESSMENT:  Patient requires largely MIN A-CGA for functional mobility with patient able to maintain standing balance x CGA for grooming tasks standing at the sink with OT providing proximal stability and educating patient on compensatory strategies. OT also educated and demonstrated patient on use of built-up utensils for self-feeding, universal cuff for grooming, and proximal writing support. Patient continues to be motivated to regain functional independence and can tolerate 3 hours of therapy a day. Highly recommend IP rehab to maximize patient safety and independence with ADL transfers and tasks.    Progression toward goals:  ?       Improving appropriately and progressing toward goals  ? Improving slowly and progressing toward goals  ? Not making progress toward goals and plan of care will be adjusted     PLAN:  Patient continues to benefit from skilled intervention to address the above impairments. Continue treatment per established plan of care. Discharge Recommendations:  Inpatient Rehab  Further Equipment Recommendations for Discharge:  TBD at rehab      SUBJECTIVE:   Patient stated ? I really like using the electric toothbrush because it helps with my coordination. ?    OBJECTIVE DATA SUMMARY:   Cognitive/Behavioral Status:  Neurologic State: Alert  Orientation Level: Oriented X4  Cognition: Appropriate for age attention/concentration  Perception: Appears intact  Perseveration: No perseveration noted  Safety/Judgement: Insight into deficits    Functional Mobility and Transfers for ADLs:  Bed Mobility:  Supine to Sit: Contact guard assistance  Sit to Supine: Contact guard assistance    Transfers:  Sit to Stand: Contact guard assistance          Balance:  Sitting: Impaired; Without support  Sitting - Static: Fair (occasional)  Sitting - Dynamic: Poor (constant support)  Standing: Impaired; Without support  Standing - Static: Fair  Standing - Dynamic : Fair    ADL Intervention:  Feeding  Adaptive Equipment: (edu on adaptive utensils)    Grooming  Brushing Teeth: Compensatory technique training;Proximal stability;Training to use affected extremity as a gross motor assistance;Minimum assistance(standing at sink)  Adaptive Equipment: (edu on universal cuff)                             Cognitive Retraining  Safety/Judgement: Insight into deficits    Pain:  Pain Scale 1: Numeric (0 - 10)  Pain Intensity 1: 7  Pain Location 1: Shoulder  Pain Orientation 1: Right;Left  Pain Description 1: Aching  Pain Intervention(s) 1: Medication (see MAR)  Activity Tolerance:   VSS  Please refer to the flowsheet for vital signs taken during this treatment. After treatment:   ? Patient left in no apparent distress sitting up in chair  ? Patient left in no apparent distress on stretcher transport for MBS  ? Call bell left within reach  ? Nursing notified  ? Caregiver present  ?  Bed alarm activated    COMMUNICATION/COLLABORATION:   The patient?s plan of care was discussed with: Physical Therapist and Registered Nurse    Artem Challenger  Time Calculation: 32 mins

## 2019-05-28 NOTE — PROGRESS NOTES
Problem: Mobility Impaired (Adult and Pediatric)  Goal: *Acute Goals and Plan of Care (Insert Text)  Description  Physical Therapy Goals  Initiated 5/18/2019  1. Patient will move from supine to sit and sit to supine , scoot up and down and roll side to side in bed with minimal assistance/contact guard assist within 7 day(s). 2.  Patient will transfer from bed to chair and chair to bed with minimal assistance/contact guard assist using the least restrictive device within 7 day(s). 3.  Patient will perform sit to stand with minimal assistance/contact guard assist within 7 day(s). 4.  Patient will ambulate with minimal assistance/contact guard assist for 150 feet with the least restrictive device within 7 day(s). 5.  Patient will participate in a strengthening program and be independent within 7 days. Outcome: Progressing Towards Goal  PHYSICAL THERAPY TREATMENT  Patient: Meir Tsai (38 y.o. male)  Date: 5/28/2019  Diagnosis: Alcohol intoxication ([facility] [F10.929] Alcohol intoxication delirium ([facility]  Procedure(s) (LRB):  C3-4 ANTERIOR CERVICAL DISCECTOMY FUSION (N/A) 7 Days Post-Op  Precautions: Fall, Spinal  Chart, physical therapy assessment, plan of care and goals were reviewed. ASSESSMENT:  Based on the objective data described below, the patient presents with Contact guard assistance level overall for transfers. Gait training completed at Contact guard assistance, Additional time and Assist x1, 350 feet and w/ cervical brace/splint and gait belt donned. Noted shuffled gait pattern along w/ path deviations and decreased step clearance, altered arm swing. Pt w/ decreased BUE/wrist strength and ROM and this may limit pt's ability to  railings on staircase and pt would need to hold onto rails for safety, support, and balance. Reviewed U/LE exercises using thera-bands w/ pt and family. Noted some blood in anderson catheter- RN aware.    The following are barriers to independence while in acute care:   -Cognitive and/or behavioral:   -Medical condition: ROM, strength, standing balance, precautions, pain tolerance, vision, medical history, motor planning and coordination    -Other:       Prior level of function: Lives w/ wife. PLAN:  Patient continues to benefit from skilled intervention to address the above impairments. Continue treatment per established plan of care. Recommend with staff: Pt continue to be OOB in chair at least 3x/day. Recommend next PT session: Functional transfers,Gait and balance training; UE/LE exercises  Discharge recommendations: Rehab at inpatient facility: patient can tolerate 3 hours of therapy (to regain functional baseline patient requires rehab)  If above is not an option then recommend: Home health (to increase independence and safety)  24 hour skilled services  24 supervision  physical assist during all functional mobility    Patient's barriers to discharging home, in addition to above impairments: family availability to assist  total assist driving to follow up medical appointment(s)/groceries/obtain medication  entry and exit into the home, patient will require physical assist from medical transport/ambulance  level of physical assist required to maintain patient safety. Equipment recommendations for successful discharge (if) home: TBD at this time        SUBJECTIVE:   Patient jokingly stated ? We did a little bit of aggressive walking. ?    OBJECTIVE DATA SUMMARY:   Critical Behavior:  Neurologic State: Alert  Orientation Level: Oriented X4  Cognition: Appropriate for age attention/concentration  Safety/Judgement: Insight into deficits  Functional Mobility Training:  Bed Mobility:     Supine to Sit: (recieved OOB in chair)  Sit to Supine: (remained OOB in chair)           Transfers:  Sit to Stand: Contact guard assistance  Stand to Sit: Contact guard assistance                             Balance:  Sitting: Intact(seated in chair)  Sitting - Static: Fair (occasional)  Sitting - Dynamic: Poor (constant support)  Standing: Impaired; Without support  Standing - Static: Fair  Standing - Dynamic : Fair  Ambulation/Gait Training:  Distance (ft): 350 Feet (ft)  Assistive Device: Gait belt;Walker, rolling  Ambulation - Level of Assistance: Contact guard assistance;Assist x1        Gait Abnormalities: Altered arm swing;Decreased step clearance; Path deviations; Shuffling gait        Base of Support: Narrowed     Speed/Olive: Pace decreased (<100 feet/min); Shuffled  Step Length: Left shortened;Right shortened                               Therapeutic Exercises:   UE/LE reviewed w/ pt using thera-bands (yellow): Wrist flex/ext  LAQ's  DF/PF    Pain:  No c/o pain this session     Activity Tolerance:   Good  Please refer to the flowsheet for vital signs taken during this treatment.     After treatment patient left:   Up in chair  Call light within reach  RN notified  Family at bedside     COMMUNICATION/COLLABORATION:   The patient?s plan of care was discussed with: Registered Nurse    Brooklynn Covarrubias PTA   Time Calculation: 26 mins

## 2019-05-28 NOTE — PROGRESS NOTES
Problem: Dysphagia (Adult)  Goal: *Acute Goals and Plan of Care (Insert Text)  Description  Speech pathology goals  Initiated 5/16/2019; re-evaluation 5/23/2019   1. Patient will participate in re-evaluation of swallow function within 7 days. Continue 5/23/2019    2) MBS by 5-30-19 met 5/28/2019      Speech Therapy Goals  Revised 5/28/2019  1. Patient will tolerate puree diet with thin liquids without signs/symptoms of aspiration given no cues within 7 day(s). Outcome: Progressing Towards Goal    SPEECH PATHOLOGY MODIFIED BARIUM SWALLOW STUDY  Patient: Alethea Lipoma (35 y.o. male)  Date: 5/28/2019  Primary Diagnosis: Alcohol intoxication ([facility] [F10.929]  Procedure(s) (LRB):  C3-4 ANTERIOR CERVICAL DISCECTOMY FUSION (N/A) 7 Days Post-Op   Precautions: swallow  Fall, Spinal    ASSESSMENT :  Based on the objective data described below, the patient presents with significant dysphagia s/p ACDF, c/b decreased pharyngeal space post surgery and decreased movement of epiglottis resulting in decreased UES opening and residue in both valleculae and pyriforms. Despite this, aspiration was minimal with successful cough. Purees cleared with additional swallows which patient took spontaneously. There was no silent aspiration. Patient will benefit from skilled intervention to address the above impairments. Patient?s rehabilitation potential is considered to be Excellent  Factors which may influence rehabilitation potential include:   ? None noted  ? Mental ability/status  ? Medical condition  ? Home/family situation and support systems  ? Safety awareness  ? Pain tolerance/management  ? Other:      PLAN :  Recommendations and Planned Interventions:  --  To avoid additional surgical intervention, recommend trial of PO feeding on CLEAR LIQUID DIET TONIGHT and then advance tomorrow to FULL LIQUID DIET.  This is similar to puree but should included less thick items than traditional puree diet. -- Remove DHT for further ease of swallow and this may help residue: d/w MD  --- Alternate sips after bites of pureed items: may need to get most nutrition for PO  -- if he is not able to get adequate nutrition or coughing is severe with this trial, could pursue no oral feeding, ut given that continued improvement is expected and patient may no better without DHT, the above is worth attempting  --small sips and bites  --meds in puree, crushed   -- Do not recommend advancing to [facility] soft before additional MBS is done. Follow up in ~1- 2 weeks depending on status     Frequency/Duration: Patient will be followed by speech-language pathology 4 times a week to address goals. Discharge Recommendations: Inpatient Rehab     SUBJECTIVE:   Patient stated ? I've never been great at swallowing? .    OBJECTIVE:     Past Medical History:   Diagnosis Date    Cancer ([facility]     BCCA skin    Family history of skin cancer     Hypertension     Ill-defined condition     meineer's disease     Past Surgical History:   Procedure Laterality Date    COLONOSCOPY N/A 7/26/2017    COLONOSCOPY performed by Larose Curling, MD at [facility] ENDOSCOPY    HX HEENT      wisdom teeth extracted    HX MOHS PROCEDURES  06/20/2017    [facility] R cheek by Dr. Carlos Eduardo Sun       Prior Level of Function/Home Situation:   Home Situation  Home Environment: Private residence  # Steps to Enter: 4  Rails to Enter: No  One/Two Story Residence: Two story, live on 1st floor  Living Alone: No  Support Systems: Spouse/Significant Other/Partner  Patient Expects to be Discharged to[de-identified] Private residence  Current DME Used/Available at Home: None  Tub or Shower Type: Shower  Diet prior to admission: regular  Current Diet:  NPO   Radiologist: Natalia  Film Views: Fluoro;Lateral  Patient Position: lateral     Trial 1: Trial 2:   Consistency Presented: Puree; Thin liquid; Nectar thick liquid     How Presented: Straw;Spoon           Bolus Acceptance: No impairment     Bolus Formation/Control: No impairment:    :     Propulsion: (good oral containment)     Oral Residue: None     Initiation of Swallow: Triggered at vallecula     Timing: (mild delay only)     Penetration: Trace;During swallow; After swallow     Aspiration/Timing: Trace;Repeated;During; After;From residual     Pharyngeal Clearance: Greater than 50%(significant residue with puree, clearance is helped by aditional swallows and sips of thin liquid)     Attempted Modifications: Alternate liquids/solids;Cup/sip(cued throat clear)     Effective Modifications: Alternate liquids/solids; Double swallow;Small sips and bites                     Decreased Tongue Base Retraction?: Yes  Laryngeal Elevation: Inadequate epiglottic inversion; Incomplete laryngeal closure(swelling in area of epiglottis vs narrow pharyngeal opening after surgery. )  Aspiration/Penetration Score: 6 (Aspiration-Contrast passes below the cords/glottis, and is cleared)   Pharyngeal Symmetry: Not assessed  Pharyngeal-Esophageal Segment: No impairment  Pharyngeal Dysfunction: Decreased strength;Decreased elevation/closure;Decreased pharyngeal wall constriction    Oral Phase Severity: No impairment  Pharyngeal Phase Severity: Moderate  NOMS:   The NOMS functional outcome measure was used to quantify this patient's level of swallowing impairment. Based on the NOMS, the patient was determined to be at level 4 for swallow function         NOMS Swallowing Levels:  Level 1 (CN): NPO  Level 2 (CM): NPO but takes consistency in therapy  Level 3 (CL): Takes less than 50% of nutrition p.o. and continues with nonoral feedings; and/or safe with mod cues; and/or max diet restriction  Level 4 (CK):  Safe swallow but needs mod cues; and/or mod diet restriction; and/or still requires some nonoral feeding/supplements  Level 5 (CJ): Safe swallow with min diet restriction; and/or needs min cues  Level 6 (CI): Independent with p.o.; rare cues; usually self cues; may need to avoid some foods or needs extra time  Level 7 ([address]): Independent for all p.o.  SHANELLE. (2003). National Outcomes Measurement System (NOMS): Adult Speech-Language Pathology User's Guide. COMMUNICATION/EDUCATION:     The patient?s plan of care including findings from MBS, recommendations, planned interventions, and recommended diet changes were discussed with: Registered Nurse and Physician. ? Posted safety precautions in patient's room. ? Patient/family have participated as able in goal setting and plan of care. ?  Patient/family agree to work toward stated goals and plan of care. ?  Patient understands intent and goals of therapy, but is neutral about his/her participation. ? Patient is unable to participate in goal setting and plan of care.     Thank you for this referral.  REMEDIOS Garcia  Time Calculation: 30 mins

## 2019-05-28 NOTE — PROGRESS NOTES
Neurosurgery Spine Progress Note  Jaylon Ornelas, AGACNP-BC  Work Cell: [phone]    Post Op Day: 7 Day Post-Op    May 28, 2019 1:17 PM     Helder Valencia    HPI:      Helder Valencia is a 78 y.o. male with history of Meniere's disease, hypertension and GERD who was found down in his backyard unresponsive with foam at the mouth. EMS was called and patient was brought to [facility] ED and he was found to have an alcohol level of 162. Neurology was consulted for possible seizure and he was started on Keppra. After recovery from his intoxication and lethargy his family noticed he had weakness in his arms. It was also noted that he had urinary retention and difficulty swallowing. MRI of the cervical spine was completed which showed disc herniation at C3-C4 level with some edema in the central cord. The neurosurgery service was consulted for evaluation. Subjective      Patient making good progress with PT/OT. Limited in right hand exercises/strengthening due to PIV so will discuss moving IV site with nursing. MBS planned for today. He denies headache, dizziness, chest pain, sob, abdominal pain, fever, chills, or nausea/vomiting. Objective:     Physical Exam:  General:  Alert and oriented. No acute distress. Respiratory:  Breathing unlabored. Equal chest expansion   Abdomen:   CV: Soft, non-tender, non-distended   No edema appreciated in the extremities   Neurologic:        Musculoskeletal:  Speech fluent. No facial droop. Follows commands. BUENO. Dense numbness in hands with impaired proprioception. Motor: deltoids 4/5, biceps 3/5, triceps 3/5, 2/5 in his  strength. Lower extremities approximately 4+/5. - nair. + babinski. Calves soft, nontender upon palpation and with passive stretch. Moves both upper and lower extremities. Incision: Cervical incision with steristrips intact. No significant erythema or swelling. No active drainage noted.              Vital Signs:    Patient Vitals for the past 8 hrs:   BP Temp Pulse Resp SpO2 Weight   19 0653 -- -- -- -- -- 69.3 kg (152 lb 12.5 oz)   19 0354 128/72 97.4 °F (36.3 °C) (!) 59 19 98 % --     Temp (24hrs), Av.7 °F (36.5 °C), Min:97.4 °F (36.3 °C), Max:98.1 °F (36.7 °C)      Intake/Output:  No intake/output data recorded.  1901 -  0700  In: 480   Out: 4950 [Urine:4950]    Pain Control:   Pain Assessment  Pain Scale 1: Numeric (0 - 10)  Pain Intensity 1: 6  Pain Onset 1: postop  Pain Location 1: Shoulder  Pain Orientation 1: Right, Left  Pain Description 1: Aching  Pain Intervention(s) 1: Medication (see MAR)    LAB:    No results for input(s): HCT, HGB, HCTEXT, HGBEXT, HCTEXT, HGBEXT in the last 72 hours. Lab Results   Component Value Date/Time    Sodium 138 2019 03:36 AM    Potassium 3.6 2019 03:36 AM    Chloride 102 2019 03:36 AM    CO2 30 2019 03:36 AM    Glucose 116 (H) 2019 03:36 AM    BUN 17 2019 03:36 AM    Creatinine 0.60 (L) 2019 03:36 AM    Calcium 8.6 2019 03:36 AM       PT/OT:   Gait:  Gait  Base of Support: Narrowed(toe out)  Speed/Olive: Fluctuations, Shuffled  Gait Abnormalities: Altered arm swing, Decreased step clearance, Shuffling gait  Ambulation - Level of Assistance: Contact guard assistance  Distance (ft): 350 Feet (ft)  Assistive Device: Gait belt                   Assessment/Plan      1. Central Cord Syndrome with spinal cord signal change at C3-4 after GLF   -MRI revealed C3-4 herniated disc with cord edema and cervical stenosis   -POD#7 C3-4 ANTERIOR CERVICAL DISCECTOMY FUSION with Dr. Star Pugh   -Continue PT/OT.  Patient would greatly benefit from inpatient rehab to regain his strength and function following a cervical spinal cord injury   -Physiatry consult   -Pain control- PRN oxycodone, tylenol   -wean decadron - taper in orders.   -stop IV toradol   -Incentive Spirometer   -Tolerating diet   -VTE Prophylaxes - TEDS & SCDs    2.  Neurogenic bladder   -due to #1   -Lofton replaced on 05/24 due to failed voiding trial    3. Alcohol abuse   -counseling as able   -thiamine, folic acid    4. Dysphagia   -SLP consulted- patient with severe pharyngeal dysphagia. Recommendations to maintain NPO status. - Dobhoff tube placed and tube feedings started   - Speech to re-eval today with MBS. If fails, then GI will be consulted for PEG placement   - ENT F/U as outpatient. 5. Possible seizure   -? Etoh induced. Brain MRI changes seen in the left medial hippocampus- needs repeat imaging in 1 month with neuro   -tolerating low dose Keppra. Neuro recommends continuing on discharge    6. Hypertension   -BP med down NG tube   -manage pain, prn hydralazine   -Hospitalist following    7. Menieres disease   -patient on hctz to help with sodium diuresis. Can consider meclizine if he becomes symptomatic with dizziness    8. Possible CVA   -MRA head/neck revealed moderate left P2 stenosis without occlusion.   -discussed with hospitalist- ok to start ASA on POD#3 (Friday). ASA restarted 05/24    9. Constipation   -bisacodyl suppository    Plan above discussed with Dr. Tahmina Moore, wife at bedside    Discharge To:  Rehab pending insurance authorization    Continue to strongly recommend discharge to inpatient rehab. Pt was highly functional prior to this injury, taking care of himself, doing aerobics at the [facility], etc. He has multiple medical issues from this injury and will benefit from all 3 therapy disciplines at inpt rehab -PT/OT/Speech. Due to his SCI, he needs aggressive rehab to regain function. Awaiting physiatry consult.     Signed By: Alivia Justice NP

## 2019-05-28 NOTE — PROGRESS NOTES
POD 7  afeb  Decadron 2mg/day  Alert  Speech improved  Hand strength improving  S/P: ACDF 3/4 for cervical stenosis that resulted in spinal cord injury - central cord syndrome  Will need aggressive inpatient rehab for best chance at full recovery

## 2019-05-29 LAB
ANION GAP SERPL CALC-SCNC: 4 MMOL/L (ref 5–15)
BASOPHILS # BLD: 0 K/UL (ref 0–0.1)
BASOPHILS NFR BLD: 1 % (ref 0–1)
BUN SERPL-MCNC: 18 MG/DL (ref 6–20)
BUN/CREAT SERPL: 28 (ref 12–20)
CALCIUM SERPL-MCNC: 8.9 MG/DL (ref 8.5–10.1)
CHLORIDE SERPL-SCNC: 103 MMOL/L (ref 97–108)
CO2 SERPL-SCNC: 30 MMOL/L (ref 21–32)
CREAT SERPL-MCNC: 0.65 MG/DL (ref 0.7–1.3)
DIFFERENTIAL METHOD BLD: ABNORMAL
EOSINOPHIL # BLD: 0.1 K/UL (ref 0–0.4)
EOSINOPHIL NFR BLD: 1 % (ref 0–7)
ERYTHROCYTE [DISTWIDTH] IN BLOOD BY AUTOMATED COUNT: 14.3 % (ref 11.5–14.5)
GLUCOSE SERPL-MCNC: 97 MG/DL (ref 65–100)
HCT VFR BLD AUTO: 45.6 % (ref 36.6–50.3)
HGB BLD-MCNC: 14.7 G/DL (ref 12.1–17)
IMM GRANULOCYTES # BLD AUTO: 0.1 K/UL (ref 0–0.04)
IMM GRANULOCYTES NFR BLD AUTO: 2 % (ref 0–0.5)
LYMPHOCYTES # BLD: 2.2 K/UL (ref 0.8–3.5)
LYMPHOCYTES NFR BLD: 25 % (ref 12–49)
MAGNESIUM SERPL-MCNC: 2.2 MG/DL (ref 1.6–2.4)
MCH RBC QN AUTO: 29.3 PG (ref 26–34)
MCHC RBC AUTO-ENTMCNC: 32.2 G/DL (ref 30–36.5)
MCV RBC AUTO: 90.8 FL (ref 80–99)
MONOCYTES # BLD: 0.9 K/UL (ref 0–1)
MONOCYTES NFR BLD: 11 % (ref 5–13)
NEUTS SEG # BLD: 5.2 K/UL (ref 1.8–8)
NEUTS SEG NFR BLD: 60 % (ref 32–75)
NRBC # BLD: 0 K/UL (ref 0–0.01)
NRBC BLD-RTO: 0 PER 100 WBC
PHOSPHATE SERPL-MCNC: 4.3 MG/DL (ref 2.6–4.7)
PLATELET # BLD AUTO: 231 K/UL (ref 150–400)
PMV BLD AUTO: 9.5 FL (ref 8.9–12.9)
POTASSIUM SERPL-SCNC: 3.9 MMOL/L (ref 3.5–5.1)
RBC # BLD AUTO: 5.02 M/UL (ref 4.1–5.7)
SODIUM SERPL-SCNC: 137 MMOL/L (ref 136–145)
WBC # BLD AUTO: 8.5 K/UL (ref 4.1–11.1)

## 2019-05-29 PROCEDURE — 85025 COMPLETE CBC W/AUTO DIFF WBC: CPT

## 2019-05-29 PROCEDURE — 74011000250 HC RX REV CODE- 250: Performed by: INTERNAL MEDICINE

## 2019-05-29 PROCEDURE — 74011250637 HC RX REV CODE- 250/637: Performed by: HOSPITALIST

## 2019-05-29 PROCEDURE — 97116 GAIT TRAINING THERAPY: CPT

## 2019-05-29 PROCEDURE — 92526 ORAL FUNCTION THERAPY: CPT

## 2019-05-29 PROCEDURE — 74011250637 HC RX REV CODE- 250/637: Performed by: NEUROLOGICAL SURGERY

## 2019-05-29 PROCEDURE — 94760 N-INVAS EAR/PLS OXIMETRY 1: CPT

## 2019-05-29 PROCEDURE — 65270000029 HC RM PRIVATE

## 2019-05-29 PROCEDURE — 74011250637 HC RX REV CODE- 250/637: Performed by: NURSE PRACTITIONER

## 2019-05-29 PROCEDURE — 84100 ASSAY OF PHOSPHORUS: CPT

## 2019-05-29 PROCEDURE — 74011250636 HC RX REV CODE- 250/636: Performed by: NEUROLOGICAL SURGERY

## 2019-05-29 PROCEDURE — 74011000258 HC RX REV CODE- 258: Performed by: HOSPITALIST

## 2019-05-29 PROCEDURE — 83735 ASSAY OF MAGNESIUM: CPT

## 2019-05-29 PROCEDURE — 80048 BASIC METABOLIC PNL TOTAL CA: CPT

## 2019-05-29 PROCEDURE — 97110 THERAPEUTIC EXERCISES: CPT

## 2019-05-29 PROCEDURE — 74011250637 HC RX REV CODE- 250/637: Performed by: INTERNAL MEDICINE

## 2019-05-29 PROCEDURE — 36415 COLL VENOUS BLD VENIPUNCTURE: CPT

## 2019-05-29 PROCEDURE — 74011250636 HC RX REV CODE- 250/636: Performed by: INTERNAL MEDICINE

## 2019-05-29 PROCEDURE — 74011250636 HC RX REV CODE- 250/636: Performed by: HOSPITALIST

## 2019-05-29 RX ORDER — DEXAMETHASONE 1 MG/1
1 TABLET ORAL DAILY
Status: DISCONTINUED | OUTPATIENT
Start: 2019-05-30 | End: 2019-06-01 | Stop reason: HOSPADM

## 2019-05-29 RX ADMIN — Medication 25 MG: at 09:00

## 2019-05-29 RX ADMIN — HYDROMORPHONE HYDROCHLORIDE 4 MG: 4 TABLET ORAL at 22:23

## 2019-05-29 RX ADMIN — DOCUSATE SODIUM 100 MG: 100 CAPSULE, LIQUID FILLED ORAL at 17:51

## 2019-05-29 RX ADMIN — Medication 10 ML: at 04:40

## 2019-05-29 RX ADMIN — DEXAMETHASONE SODIUM PHOSPHATE 2 MG: 4 INJECTION, SOLUTION INTRAMUSCULAR; INTRAVENOUS at 09:22

## 2019-05-29 RX ADMIN — PANTOPRAZOLE SODIUM 40 MG: 40 TABLET, DELAYED RELEASE ORAL at 10:10

## 2019-05-29 RX ADMIN — SODIUM CHLORIDE 500 MG: 900 INJECTION, SOLUTION INTRAVENOUS at 04:39

## 2019-05-29 RX ADMIN — DOCUSATE SODIUM 100 MG: 100 CAPSULE, LIQUID FILLED ORAL at 09:00

## 2019-05-29 RX ADMIN — HYDROMORPHONE HYDROCHLORIDE 4 MG: 4 TABLET ORAL at 14:28

## 2019-05-29 RX ADMIN — SENNOSIDES 17.2 MG: 8.6 TABLET, FILM COATED ORAL at 09:22

## 2019-05-29 RX ADMIN — LEVETIRACETAM 500 MG: 100 SOLUTION ORAL at 15:53

## 2019-05-29 RX ADMIN — TAMSULOSIN HYDROCHLORIDE 0.4 MG: 0.4 CAPSULE ORAL at 09:20

## 2019-05-29 RX ADMIN — ASPIRIN 81 MG 81 MG: 81 TABLET ORAL at 09:20

## 2019-05-29 RX ADMIN — POLYETHYLENE GLYCOL 3350 17 G: 17 POWDER, FOR SOLUTION ORAL at 09:22

## 2019-05-29 RX ADMIN — OXYCODONE HYDROCHLORIDE 5 MG: 5 TABLET ORAL at 07:17

## 2019-05-29 RX ADMIN — Medication 10 ML: at 22:27

## 2019-05-29 RX ADMIN — ENOXAPARIN SODIUM 40 MG: 40 INJECTION SUBCUTANEOUS at 12:45

## 2019-05-29 NOTE — PROGRESS NOTES
Hospitalist Progress Note    NAME: Valeria Geiger   :  1950   MRN:  [id number]       Assessment / Plan:  B/L upper extremity weakness and tingling in arms with neck and shoulder pain POA but  more evident after he woke up later in hospitalization  - secondary to cervical cord compression from C3/C4 disc herniation  - s/p  Anterior cervical diskectomy, decompression, fusion to C3-4  - On Decadron, wean per NSGY (weaned to daily dose )  - Pratibha Winston following  - PT/OT, patient would greatly benefit from intensive inpatient rehab.     Delirium and fall , resolved on   -possibly secondary to seizure, resolved. -MRI shows hippocampus restricted diffusion , likley suggestive of seizure like activity ?  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  -Also initially though delirium compounded by alcohol intoxication, level 162  -EEG , no seizure , started on keppra by neuro though for now   -Echo EF 61-65% with no RWMA, no PFO  -Continue on Keppra till seen by Dr Keren Evans  -The patient is back to baseline mental status  -Repeat MRI brain in 4 weeks, follow up outpatient with Dr. Keren Evans in 4 weeks.     Dysphagia - secondary to edema, R vocal cord paralysis, spinal injury   - SLP following, if no improvement will need to discuss PEG (will readdress early next week)  - ENT following,   - Dophoff placement   - MBS done. SLP advised continue full liquids.      Seizure like activity   -Started on keppra by neuro   -MRI shows hippocampus restricted diffusion , likley suggestive of seizure like activity      Leukocytosis and lactic acidosis on admission- reactive 2/2 seizure, resolved.    HTN - On prn hydralazine, continue HCTZ, BP goal in hospital less than 160  Alcohol intoxication and now risk of withdrawal Resolved      Neurogenic bladder from above - improving.- anderson removed , tamsulosin started.      PT/OT      Code status:  Full   DVT prophylaxis: heparin s/q when cleared by Surgery     Care Plan discussed with: Patient/Family  Disposition: TBD     Subjective:     Chief Complaint / Reason for Physician Visit  \" tolerating clear liquids, \". Discussed with RN events overnight. Review of Systems:  Symptom Y/N Comments  Symptom Y/N Comments   Fever/Chills    Chest Pain     Poor Appetite    Edema     Cough    Abdominal Pain     Sputum    Joint Pain     SOB/CANDELARIA    Pruritis/Rash     Nausea/vomit    Tolerating PT/OT     Diarrhea    Tolerating Diet     Constipation    Other       Could NOT obtain due to:      Objective:     VITALS:   Last 24hrs VS reviewed since prior progress note. Most recent are:  Patient Vitals for the past 24 hrs:   Temp Pulse Resp BP SpO2   05/29/19 1548 98.1 °F (36.7 °C) -- 14 135/81 98 %   05/29/19 0947 97.4 °F (36.3 °C) 63 16 132/78 99 %   05/29/19 0311 98.1 °F (36.7 °C) 69 16 148/76 --   05/28/19 2011 97.9 °F (36.6 °C) 64 16 158/82 97 %       Intake/Output Summary (Last 24 hours) at 5/29/2019 1637  Last data filed at 5/29/2019 0557  Gross per 24 hour   Intake --   Output 2175 ml   Net -2175 ml        PHYSICAL EXAM:  General: WD, WN. Alert, cooperative, no acute distress    EENT:  EOMI. Anicteric sclerae. MMM  Resp:  CTA bilaterally, no wheezing or rales. No accessory muscle use  CV:  Regular  rhythm,  No edema  GI:  Soft, Non distended, Non tender.  +Bowel sounds  Neurologic:  Alert and oriented X 3, normal speech,weakness in upper extremities bilaterally. Psych:   Good insight. Not anxious nor agitated  Skin:  No rashes.   No jaundice    Reviewed most current lab test results and cultures  YES  Reviewed most current radiology test results   YES  Review and summation of old records today    NO  Reviewed patient's current orders and MAR    YES  PMH/SH reviewed - no change compared to H&P  ________________________________________________________________________  Care Plan discussed with:    Comments   Patient     Family      RN     Care Manager     Consultant Multidiciplinary team rounds were held today with , nursing, pharmacist and clinical coordinator. Patient's plan of care was discussed; medications were reviewed and discharge planning was addressed. ________________________________________________________________________  Total NON critical care TIME:  30  Minutes    Total CRITICAL CARE TIME Spent:   Minutes non procedure based      Comments   >50% of visit spent in counseling and coordination of care     ________________________________________________________________________  Harry Wolf MD     Procedures: see electronic medical records for all procedures/Xrays and details which were not copied into this note but were reviewed prior to creation of Plan. LABS:  I reviewed today's most current labs and imaging studies.   Pertinent labs include:  Recent Labs     05/29/19  0452   WBC 8.5   HGB 14.7   HCT 45.6        Recent Labs     05/29/19 0452 05/28/19  0336    138   K 3.9 3.6    102   CO2 30 30   GLU 97 116*   BUN 18 17   CREA 0.65* 0.60*   CA 8.9 8.6   MG 2.2 2.1   PHOS 4.3 4.0       Signed: Harry Wolf MD

## 2019-05-29 NOTE — PROGRESS NOTES
POD 8  diet advanced to full liquids  afeb  Decadron 2mg/day  Alert  Speech improved  Hand strength improving  S/P: ACDF 3/4 for cervical stenosis that resulted in spinal cord injury - central cord syndrome  Will need aggressive inpatient rehab for best chance at full recovery

## 2019-05-29 NOTE — PROGRESS NOTES
Problem: Mobility Impaired (Adult and Pediatric)  Goal: *Acute Goals and Plan of Care (Insert Text)  Description  Physical Therapy Goals  Reassessment 5/29/2019  1. Patient will move from supine to sit and sit to supine , scoot up and down and roll side to side in bed with supervision within 7 day(s). 2.  Patient will transfer from bed to chair and chair to bed with contact guard assist using the least restrictive device within 7 day(s). 3.  Patient will perform sit to stand with contact guard assist within 7 day(s). 4.  Patient will ambulate with contact guard assist for 200 feet with the least restrictive device within 7 day(s). 5.  Patient will participate in a strengthening program and be independent within 7 days. Physical Therapy Goals  Initiated 5/18/2019  1. Patient will move from supine to sit and sit to supine , scoot up and down and roll side to side in bed with minimal assistance/contact guard assist within 7 day(s). 2.  Patient will transfer from bed to chair and chair to bed with minimal assistance/contact guard assist using the least restrictive device within 7 day(s). 3.  Patient will perform sit to stand with minimal assistance/contact guard assist within 7 day(s). 4.  Patient will ambulate with minimal assistance/contact guard assist for 150 feet with the least restrictive device within 7 day(s). 5.  Patient will participate in a strengthening program and be independent within 7 days. Outcome: Progressing Towards Goal   PHYSICAL THERAPY TREATMENT: WEEKLY REASSESSMENT  Patient: Helder Valencia (89 y.o. male)  Date: 5/29/2019  Diagnosis: Alcohol intoxication ([facility] [F10.929] Alcohol intoxication delirium ([facility]  Procedure(s) (LRB):  C3-4 ANTERIOR CERVICAL DISCECTOMY FUSION (N/A) 8 Days Post-Op  Precautions: Fall, Spinal  Chart, physical therapy assessment, plan of care and goals were reviewed.     ASSESSMENT:  Based on the objective data described below, the patient presents with Minimum assistance level overall for transfers. Gait training completed at Minimum assistance with support of assistive device, 160 feet, however pt unable to maintain  on walker safely due to wrist flexor tone, decreased strength, coordination, and sensation of BUE. Pt needs assistance for to maintain hand placement and for walker management. Pt requires constant support for safe ambulation at all times and is currently at high fall risk. Pt will require intensive therapy in rehab for spinal cord rehab. Highly recommend inpatient rehab. The following are barriers to independence while in acute care:   -Cognitive and/or behavioral: attention to task and insight into deficits  -Medical condition: ROM, strength, functional reach, functional endurance, sitting balance, standing balance, precautions, sensation and coordination    -Other:       Prior level of function: Independent PTA, assist with baby sitting for 1year old grandson      PLAN:  Patient continues to benefit from skilled intervention to address the above impairments. Continue treatment per established plan of care.   Recommend with staff: OOB for meals and bathroom with assistance   Recommend next PT session: progressive gait and balance training, therapeutic exercise   Discharge recommendations: Rehab at inpatient facility: patient can tolerate 3 hours of therapy (to regain functional baseline patient requires rehab)  If above is not an option then recommend: Rehab at skilled nursing facility (SNF) (to regain functional baseline patient requires rehab) versus home health/outpatient PT    Patient's barriers to discharging home, in addition to above impairments: family availability to assist  level of physical assist required to maintain patient safety, need constant guarding and assistance while ambulating     Equipment recommendations for successful discharge (if) home: to be determined, may need as assistive device for safe ambulation at home        SUBJECTIVE:   Patient agreeable to participate with therapy. OBJECTIVE DATA SUMMARY:   Critical Behavior:  Neurologic State: Alert, Appropriate for age  Orientation Level: Oriented X4  Cognition: Follows commands  Safety/Judgement: decreased insight into deficits  Functional Mobility Training:  Bed Mobility:  Rolling: Minimum assistance;Assist x1;Adaptive equipment; Additional time  Supine to Sit: Minimum assistance; Adaptive equipment; Additional time;Assist x1     Scooting: Contact guard assistance;Assist x1        Transfers:  Sit to Stand: Minimum assistance; Additional time;Assist x1;Adaptive equipment  Stand to Sit: Contact guard assistance; Additional time;Assist x1;Adaptive equipment                             Balance:  Sitting: Impaired  Sitting - Static: Good (unsupported)  Sitting - Dynamic: Fair (occasional)  Standing: Impaired  Standing - Static: Fair  Standing - Dynamic : Fair  Ambulation/Gait Training:  Distance (ft): 160 Feet (ft)  Assistive Device: Brace/Splint;Gait belt;Walker, rolling  Ambulation - Level of Assistance: Minimal assistance;Assist x1        Gait Abnormalities: Shuffling gait;Trunk sway increased        Base of Support: Narrowed     Speed/Oliev: Pace decreased (<100 feet/min)  Step Length: Right shortened;Left shortened                Therapeutic Exercises:   Instructed for wrist extension stretching and coordination exercises     Pain:  No complaints     Activity Tolerance:   Good  Please refer to the flowsheet for vital signs taken during this treatment.     After treatment patient left:   Up in chair  Call light within reach     COMMUNICATION/COLLABORATION:   The patient?s plan of care was discussed with: Registered Nurse    Drew Doran   Time Calculation: 33 mins

## 2019-05-29 NOTE — PROGRESS NOTES
Clinical Pharmacy Note: IV to PO Automatic Conversion  Please note: Jesus Herr medication levetiracetam has been changed from IV to PO based on the following critiera:    Patient is taking scheduled oral medications  Patient is tolerating tube feeds at goal rate or a full liquid, soft or regular diet    This IV to PO conversion is based on the P&T approved automatic conversion policy for eligible patients. Please call with questions.     BARBARA BlairD

## 2019-05-29 NOTE — PROGRESS NOTES
Problem: Dysphagia (Adult)  Goal: *Acute Goals and Plan of Care (Insert Text)  Description  Speech pathology goals  Initiated 5/16/2019; re-evaluation 5/23/2019   1. Patient will participate in re-evaluation of swallow function within 7 days. Continue 5/23/2019    2) MBS by 5-30-19 met 5/28/2019      Speech Therapy Goals  Revised 5/28/2019  1. Patient will tolerate puree diet with thin liquids without signs/symptoms of aspiration given no cues within 7 day(s). Outcome: Progressing Towards Goal     SPEECH LANGUAGE PATHOLOGY DYSPHAGIA TREATMENT  Patient: Hilton Alba (89 y.o. male)  Date: 5/29/2019  Diagnosis: Alcohol intoxication ([facility] [facility] [F10.929] Alcohol intoxication delirium ([facility] [address] [facility]  Procedure(s) (LRB):  C3-4 ANTERIOR CERVICAL DISCECTOMY FUSION (N/A) 8 Days Post-Op  Precautions: swallow Fall, Spinal    ASSESSMENT:  Patient with intermittent throat clearing and coughing with thin liquids, however per MBS, throat clear/coughing was effective in expelling trace aspiration. Offered puree and patient with multiple swallows consistent with pharyngeal residue. Provided education regarding need for liquid wash to clear residue, after which patient independently drank thin liquids after every 2-3 bites. Throat clearing/coughing did not worsen with purees. Recommend advance to full liquid diet with close monitoring for any change in respiratory status or vital signs. Hopeful that swallow function will continue to improve with time as edema improves. Progression toward goals:  ?         Improving appropriately and progressing toward goals  ? Improving slowly and progressing toward goals  ?          Not making progress toward goals and plan of care will be adjusted     PLAN:  Recommendations and Planned Interventions:  --Full liquid diet  --Multiple swallows as needed  --Alternate bite/sip  --Meds crushed in puree, followed by sip of liquid  --Monitor for any change in vital signs or respiratory status  --Will follow up tomorrow for diet tolerance  Patient continues to benefit from skilled intervention to address the above impairments. Continue treatment per established plan of care. Discharge Recommendations:  Inpatient Rehab     SUBJECTIVE:   Patient stated ? There's still a little there. ?    OBJECTIVE:   Cognitive and Communication Status:  Neurologic State: Alert, Appropriate for age  Orientation Level: Oriented X4  Cognition: Follows commands  Perception: Appears intact  Perseveration: No perseveration noted  Safety/Judgement: Insight into deficits  Dysphagia Treatment:    P.O. Trials:  Patient Position: upright in bed  Vocal quality prior to P.O.: No impairment;Hoarse  Consistency Presented: Thin liquid;Puree  How Presented: SLP-fed/presented;Spoon;Straw     Bolus Acceptance: No impairment  Bolus Formation/Control: No impairment     Propulsion: No impairment  Oral Residue: None  Initiation of Swallow: Delayed (# of seconds)  Laryngeal Elevation: Decreased  Aspiration Signs/Symptoms: Clear throat;Strong cough  Pharyngeal Phase Characteristics: Multiple swallows; Suspected pharyngeal residue  Effective Modifications: Alternate liquids/solids;Small sips and bites  Cues for Modifications: Minimal               Pain:  Pain Scale 1: Numeric (0 - 10)  Pain Intensity 1: 0     After treatment:   ?              Patient left in no apparent distress sitting up in chair  ? Patient left in no apparent distress in bed  ? Call bell left within reach  ? Nursing notified  ? Caregiver present  ? Bed alarm activated    COMMUNICATION/EDUCATION:     The patient?s plan of care including recommendations, planned interventions, and recommended diet changes were discussed with: Registered Nurse. ? Posted safety precautions in patient's room.     REMEDIOS Moy  Time Calculation: 20 mins

## 2019-05-29 NOTE — PROGRESS NOTES
Neurosurgery Spine Progress Note  Jonathan Tai, AGACNP-BC  Work Cell: [phone]    Post Op Day: 8 Day Post-Op    May 29, 2019 1:17 PM     Thi Masters    HPI:      Thi Masters is a 78 y.o. male with history of Meniere's disease, hypertension and GERD who was found down in his backyard unresponsive with foam at the mouth. EMS was called and patient was brought to [facility] ED and he was found to have an alcohol level of 162. Neurology was consulted for possible seizure and he was started on Keppra. After recovery from his intoxication and lethargy his family noticed he had weakness in his arms. It was also noted that he had urinary retention and difficulty swallowing. MRI of the cervical spine was completed which showed disc herniation at C3-C4 level with some edema in the central cord. The neurosurgery service was consulted for evaluation. Subjective      Patient doing well and feeling encouraged about his progress. Dobhoff removed yesterday and patient is tolerating clears. Wife states patient had blood in urine yesterday. No current hematuria and hemoglobin is stable. He denies headache, dizziness, chest pain, sob, abdominal pain, fever, chills, or nausea/vomiting. Objective:     Physical Exam:  General:  Alert and oriented. No acute distress. Respiratory:  Breathing unlabored. Equal chest expansion   Abdomen:   CV: Soft, non-tender, non-distended   No edema appreciated in the extremities   Neurologic:        Musculoskeletal:  Speech fluent. No facial droop. Follows commands. BUENO. Dense numbness in hands with impaired proprioception. Motor: deltoids 4/5, biceps 3/5, triceps 3/5, 2/5 in his  strength bilaterally. Lower extremities approximately 4+/5. - nair. + babinski. Calves soft, nontender upon palpation and with passive stretch. Moves both upper and lower extremities. Incision: Cervical incision with steristrips intact. No significant erythema or swelling.   No active drainage noted. Vital Signs:    Patient Vitals for the past 8 hrs:   BP Temp Pulse Resp SpO2   19 0947 132/78 97.4 °F (36.3 °C) 63 16 99 %   19 0311 148/76 98.1 °F (36.7 °C) 69 16 --     Temp (24hrs), Av.6 °F (36.4 °C), Min:97.1 °F (36.2 °C), Max:98.1 °F (36.7 °C)      Intake/Output:  No intake/output data recorded.  190 -  0700  In: 120   Out: 3675 [Urine:3675]    Pain Control:   Pain Assessment  Pain Scale 1: Numeric (0 - 10)  Pain Intensity 1: 0  Pain Onset 1: postop  Pain Location 1: Shoulder  Pain Orientation 1: Right, Left  Pain Description 1: Aching, Intermittent  Pain Intervention(s) 1: Medication (see MAR)    LAB:    Recent Labs     19  0452   HCT 45.6   HGB 14.7     Lab Results   Component Value Date/Time    Sodium 137 2019 04:52 AM    Potassium 3.9 2019 04:52 AM    Chloride 103 2019 04:52 AM    CO2 30 2019 04:52 AM    Glucose 97 2019 04:52 AM    BUN 18 2019 04:52 AM    Creatinine 0.65 (L) 2019 04:52 AM    Calcium 8.9 2019 04:52 AM       PT/OT:   Gait:  Gait  Base of Support: Narrowed  Speed/Olive: Pace decreased (<100 feet/min), Shuffled  Step Length: Left shortened, Right shortened  Gait Abnormalities: Altered arm swing, Decreased step clearance, Path deviations, Shuffling gait  Ambulation - Level of Assistance: Contact guard assistance, Assist x1  Distance (ft): 350 Feet (ft)  Assistive Device: Gait belt, Walker, rolling                   Assessment/Plan      1. Central Cord Syndrome with spinal cord signal change at C3-4 after GLF   -MRI revealed C3-4 herniated disc with cord edema and cervical stenosis   -POD#8 C3-4 ANTERIOR CERVICAL DISCECTOMY FUSION with Dr. Brooklyn Pena   -Continue PT/OT.  Patient would greatly benefit from inpatient rehab to regain his strength and function following a cervical spinal cord injury   -Physiatry consult- Discussed with Dr. Belle Meza who is unable to evaluate patient today. Discussed patient's current progress and Dr. Arlyn Rey agrees patient is not safe to go home as he still requires gait belt with ambulation and needs assistance with all higher level ADLs. -Pain control- PRN oxycodone, tylenol   -d/c decadron   -Incentive Spirometer   -Tolerating diet   -VTE Prophylaxes -lovenox started 5/26    2. Neurogenic bladder   -due to #1   -Lofton replaced on 05/24 due to failed voiding trial- voiding trial recommended in rehab    3. Alcohol abuse   -counseling as able   -thiamine, folic acid    4. Dysphagia   -SLP consulted- patient with severe pharyngeal dysphagia. patient placed NPO  and Dobhoff tube placed 5/23    -Cleared MBS yesterday and SLP recommending trial of clear liquid diet. Dobhoff removed 5/28   - aspiration precautions    5. Possible seizure   -? Etoh induced. Brain MRI changes seen in the left medial hippocampus- needs repeat imaging in 1 month with neuro   -tolerating low dose Keppra. Neuro recommends continuing on discharge   -follow-up with Dr. Cathryn Hendrix in 1 month- discussed with patient and wife    6. Hypertension   -nocturnal elevations noted. Currently on hctz 25 mg    -manage pain, prn hydralazine   -Hospitalist following    7. Menieres disease   -patient on hctz to help with sodium diuresis. Can consider meclizine if he becomes symptomatic with dizziness    8. Possible CVA   -MRA head/neck revealed moderate left P2 stenosis without occlusion.   -discussed with hospitalist- ok to start ASA on POD#3 (Friday). ASA restarted 05/24    9. Constipation   -bisacodyl suppository prn    Plan above discussed with Dr. Rebecca Sosa, , wife at bedside    Discharge To:  Rehab pending insurance authorization    Continue to strongly recommend discharge to inpatient rehab.  Pt was highly functional prior to this injury, taking care of himself, doing aerobics at the [facility], etc. He has multiple medical issues from this injury and will benefit from all 3 therapy disciplines at inpt rehab -PT/OT/Speech. Due to his SCI, he needs aggressive rehab to regain function. Awaiting physiatry consult.     Signed By: Paulino Muse NP

## 2019-05-29 NOTE — PROGRESS NOTES
PHILIP was notified by Satira Saint with [address] that this pt has been denied for IP rehab. She informed CM that she has told the NP Josi about the denial. PHILIP will wait to see if the physician is going to call for a peer to peer review.  Pearl Quarles

## 2019-05-29 NOTE — PROGRESS NOTES
Chart reviewed and attempted to see patient for OT intervention. Patient in room with visitor present and politely requesting OT return after his visit with his guest. Will follow up for OT intervention as able and appropriate. Thank you.
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
## 2019-05-30 LAB
ANION GAP SERPL CALC-SCNC: 6 MMOL/L (ref 5–15)
BASOPHILS # BLD: 0 K/UL (ref 0–0.1)
BASOPHILS NFR BLD: 0 % (ref 0–1)
BUN SERPL-MCNC: 21 MG/DL (ref 6–20)
BUN/CREAT SERPL: 30 (ref 12–20)
CALCIUM SERPL-MCNC: 8.5 MG/DL (ref 8.5–10.1)
CHLORIDE SERPL-SCNC: 101 MMOL/L (ref 97–108)
CO2 SERPL-SCNC: 29 MMOL/L (ref 21–32)
CREAT SERPL-MCNC: 0.69 MG/DL (ref 0.7–1.3)
DIFFERENTIAL METHOD BLD: ABNORMAL
EOSINOPHIL # BLD: 0 K/UL (ref 0–0.4)
EOSINOPHIL NFR BLD: 0 % (ref 0–7)
ERYTHROCYTE [DISTWIDTH] IN BLOOD BY AUTOMATED COUNT: 14 % (ref 11.5–14.5)
GLUCOSE BLD STRIP.AUTO-MCNC: 123 MG/DL (ref 65–100)
GLUCOSE SERPL-MCNC: 109 MG/DL (ref 65–100)
HCT VFR BLD AUTO: 43.8 % (ref 36.6–50.3)
HGB BLD-MCNC: 14.1 G/DL (ref 12.1–17)
IMM GRANULOCYTES # BLD AUTO: 0.1 K/UL (ref 0–0.04)
IMM GRANULOCYTES NFR BLD AUTO: 1 % (ref 0–0.5)
LYMPHOCYTES # BLD: 1.1 K/UL (ref 0.8–3.5)
LYMPHOCYTES NFR BLD: 12 % (ref 12–49)
MAGNESIUM SERPL-MCNC: 2.4 MG/DL (ref 1.6–2.4)
MCH RBC QN AUTO: 29 PG (ref 26–34)
MCHC RBC AUTO-ENTMCNC: 32.2 G/DL (ref 30–36.5)
MCV RBC AUTO: 90.1 FL (ref 80–99)
MONOCYTES # BLD: 0.7 K/UL (ref 0–1)
MONOCYTES NFR BLD: 7 % (ref 5–13)
NEUTS SEG # BLD: 7.5 K/UL (ref 1.8–8)
NEUTS SEG NFR BLD: 80 % (ref 32–75)
NRBC # BLD: 0 K/UL (ref 0–0.01)
NRBC BLD-RTO: 0 PER 100 WBC
PLATELET # BLD AUTO: 203 K/UL (ref 150–400)
PMV BLD AUTO: 9.5 FL (ref 8.9–12.9)
POTASSIUM SERPL-SCNC: 3.4 MMOL/L (ref 3.5–5.1)
RBC # BLD AUTO: 4.86 M/UL (ref 4.1–5.7)
SERVICE CMNT-IMP: ABNORMAL
SODIUM SERPL-SCNC: 136 MMOL/L (ref 136–145)
WBC # BLD AUTO: 9.5 K/UL (ref 4.1–11.1)

## 2019-05-30 PROCEDURE — 74011250637 HC RX REV CODE- 250/637: Performed by: HOSPITALIST

## 2019-05-30 PROCEDURE — 74011250637 HC RX REV CODE- 250/637: Performed by: NURSE PRACTITIONER

## 2019-05-30 PROCEDURE — 97530 THERAPEUTIC ACTIVITIES: CPT

## 2019-05-30 PROCEDURE — 65660000000 HC RM CCU STEPDOWN

## 2019-05-30 PROCEDURE — 74011250637 HC RX REV CODE- 250/637: Performed by: INTERNAL MEDICINE

## 2019-05-30 PROCEDURE — 74011250637 HC RX REV CODE- 250/637: Performed by: NEUROLOGICAL SURGERY

## 2019-05-30 PROCEDURE — 74011250636 HC RX REV CODE- 250/636: Performed by: NEUROLOGICAL SURGERY

## 2019-05-30 PROCEDURE — 85025 COMPLETE CBC W/AUTO DIFF WBC: CPT

## 2019-05-30 PROCEDURE — 97535 SELF CARE MNGMENT TRAINING: CPT

## 2019-05-30 PROCEDURE — 82962 GLUCOSE BLOOD TEST: CPT

## 2019-05-30 PROCEDURE — 74011250636 HC RX REV CODE- 250/636: Performed by: NURSE PRACTITIONER

## 2019-05-30 PROCEDURE — 97116 GAIT TRAINING THERAPY: CPT

## 2019-05-30 PROCEDURE — 80048 BASIC METABOLIC PNL TOTAL CA: CPT

## 2019-05-30 PROCEDURE — 92526 ORAL FUNCTION THERAPY: CPT

## 2019-05-30 PROCEDURE — 83735 ASSAY OF MAGNESIUM: CPT

## 2019-05-30 PROCEDURE — 74011000250 HC RX REV CODE- 250: Performed by: INTERNAL MEDICINE

## 2019-05-30 PROCEDURE — 36415 COLL VENOUS BLD VENIPUNCTURE: CPT

## 2019-05-30 RX ORDER — FACIAL-BODY WIPES
10 EACH TOPICAL EVERY OTHER DAY
Status: DISCONTINUED | OUTPATIENT
Start: 2019-06-01 | End: 2019-06-01 | Stop reason: HOSPADM

## 2019-05-30 RX ORDER — FACIAL-BODY WIPES
10 EACH TOPICAL ONCE
Status: COMPLETED | OUTPATIENT
Start: 2019-05-30 | End: 2019-05-30

## 2019-05-30 RX ORDER — DOCUSATE SODIUM 50 MG/5ML
100 LIQUID ORAL 2 TIMES DAILY
Status: DISCONTINUED | OUTPATIENT
Start: 2019-05-30 | End: 2019-06-01 | Stop reason: HOSPADM

## 2019-05-30 RX ORDER — HYDROCHLOROTHIAZIDE 25 MG/1
25 TABLET ORAL DAILY
Status: DISCONTINUED | OUTPATIENT
Start: 2019-05-30 | End: 2019-06-01 | Stop reason: HOSPADM

## 2019-05-30 RX ORDER — PANTOPRAZOLE SODIUM 40 MG/1
40 TABLET, DELAYED RELEASE ORAL
Status: DISCONTINUED | OUTPATIENT
Start: 2019-05-31 | End: 2019-06-01 | Stop reason: HOSPADM

## 2019-05-30 RX ADMIN — BISACODYL 10 MG: 10 SUPPOSITORY RECTAL at 08:42

## 2019-05-30 RX ADMIN — DOCUSATE SODIUM 100 MG: 50 LIQUID ORAL at 18:29

## 2019-05-30 RX ADMIN — LEVETIRACETAM 750 MG: 100 SOLUTION ORAL at 17:45

## 2019-05-30 RX ADMIN — Medication 10 ML: at 05:48

## 2019-05-30 RX ADMIN — PANTOPRAZOLE SODIUM 40 MG: 40 TABLET, DELAYED RELEASE ORAL at 08:42

## 2019-05-30 RX ADMIN — HYDROMORPHONE HYDROCHLORIDE 4 MG: 4 TABLET ORAL at 21:28

## 2019-05-30 RX ADMIN — DOCUSATE SODIUM 100 MG: 100 CAPSULE, LIQUID FILLED ORAL at 08:35

## 2019-05-30 RX ADMIN — ASPIRIN 81 MG 81 MG: 81 TABLET ORAL at 08:35

## 2019-05-30 RX ADMIN — POLYETHYLENE GLYCOL 3350 17 G: 17 POWDER, FOR SOLUTION ORAL at 08:34

## 2019-05-30 RX ADMIN — TAMSULOSIN HYDROCHLORIDE 0.4 MG: 0.4 CAPSULE ORAL at 08:35

## 2019-05-30 RX ADMIN — HYDROMORPHONE HYDROCHLORIDE 4 MG: 4 TABLET ORAL at 14:28

## 2019-05-30 RX ADMIN — ENOXAPARIN SODIUM 40 MG: 40 INJECTION SUBCUTANEOUS at 10:10

## 2019-05-30 RX ADMIN — Medication 10 ML: at 14:28

## 2019-05-30 RX ADMIN — HYDROCHLOROTHIAZIDE 25 MG: 25 TABLET ORAL at 10:11

## 2019-05-30 RX ADMIN — DEXAMETHASONE 1 MG: 1 TABLET ORAL at 08:35

## 2019-05-30 RX ADMIN — SENNOSIDES 17.2 MG: 8.6 TABLET, FILM COATED ORAL at 08:35

## 2019-05-30 RX ADMIN — Medication 10 ML: at 21:29

## 2019-05-30 RX ADMIN — LEVETIRACETAM 500 MG: 100 SOLUTION ORAL at 04:50

## 2019-05-30 NOTE — PROGRESS NOTES
Problem: Self Care Deficits Care Plan (Adult)  Goal: *Acute Goals and Plan of Care (Insert Text)  Description  Occupational Therapy Goals    Goals reviewed and continued/modified as follows5/30/2019  1. Patient will perform upper body dressing with supervision/set-up within 7 day(s) using compensatory strategies PRN. 2.  Patient will perform lower body dressing with minimal assistance/contact guard assist within 7 day(s) using compensatory strategies PRN. 3.  Patient will perform grooming with supervision/setup within 7 day(s) using compensatory strategies PRN using LUE as gross motor stabilizer. 4.  Patient will perform toilet transfers with supervision/setup within 7 day(s). 5.  Patient will perform all aspects of toileting with minimal assistance/contact guard assist within 7 day(s). 6.  Patient will demonstrate ability to perform self-ROM for R and L digit/wrist extension with supervision/setup within 7 day(s). Initiated 5/22/2019  1. Patient will perform upper body dressing with supervision/set-up within 7 day(s) using compensatory strategies PRN. 2.  Patient will perform lower body dressing with minimal assistance/contact guard assist within 7 day(s) using compensatory strategies PRN. 3.  Patient will perform grooming with minimal assistance/contact guard assist within 7 day(s) using compensatory strategies PRN. 4.  Patient will perform toilet transfers with minimal assistance/contact guard assist within 7 day(s). 5.  Patient will perform all aspects of toileting with minimal assistance/contact guard assist within 7 day(s). 6.  Patient will demonstrate ability to perform self-ROM for R and L digit/wrist extension with minimal assistance within 7 day(s). 5/30/2019 1218 by Heidy Cooney  Outcome: Progressing Towards Goal  5/30/2019 1215 by Heidy Cooney  Outcome: Progressing Towards Goal   OCCUPATIONAL THERAPY TREATMENT: WEEKLY REASSESSMENT  Patient: Alethea Lipoma (42 y.o. male)  Date: 5/30/2019  Diagnosis: Alcohol intoxication ([facility] [F10.929] Alcohol intoxication delirium ([facility] [address]  Procedure(s) (LRB):  C3-4 ANTERIOR CERVICAL DISCECTOMY FUSION (N/A) 9 Days Post-Op  Precautions: Fall, Spinal  Chart, occupational therapy assessment, plan of care, and goals were reviewed. ASSESSMENT:  Patient continues to require MIN A for ADL transfers, MIN A for UB ADLs, and MOD A for LB ADLS with patient making progress in OT however limited by impaired dynamic sitting/standing balance, decreased FM coordination, decreased AROM of bilateral wrist/digits, impaired proprioception and decreased sensation s/p central cord syndrome and POD 9 from cervical sx. Patient continues to present below functional baseline of independence at this time. Continue to recommend rehab to maximize patient safety and independence with ADL transfers and tasks. Noted patient denied IP rehab. If patient returns home, will need 24/7 supervision and physical A from wife for balance, HHOT, transition to outpatient hand OT, AD for mobility, and DME for bathroom. Progression toward goals:  ?            Improving appropriately and progressing toward goals  ? Improving slowly and progressing toward goals  ? Not making progress toward goals and plan of care will be adjusted     PLAN:  Goals have been updated based on progression since last assessment. Patient continues to benefit from skilled intervention to address the above impairments. Continue to follow patient 5 times a week to address goals. Planned Interventions:  ?                    Self Care Training                  ? Therapeutic Activities  ? Functional Mobility Training    ? Cognitive Retraining  ? Therapeutic Exercises           ? Endurance Activities  ? Balance Training                   ? Neuromuscular Re-Education  ? Visual/Perceptual Training     ? Home Safety Training  ? Patient Education                 ? Family Training/Education  ? Other (comment):  Discharge Recommendations: Rehab (if this is not an option; patient will need A for all physical transfers/ADL tasks and 24/7 supervision for safety; Myesha Section; transition to outpatient OT hand therapy)  Further Equipment Recommendations for Discharge: TBD      SUBJECTIVE:   Patient stated ? I think going to rehab would be better than home. ?    OBJECTIVE DATA SUMMARY:   Cognitive/Behavioral Status:  Neurologic State: Alert  Orientation Level: Oriented X4  Cognition: Appropriate for age attention/concentration  Perception: Appears intact  Perseveration: No perseveration noted  Safety/Judgement: Insight into deficits    Functional Mobility and Transfers for ADLs:  Bed Mobility:  Supine to Sit: Contact guard assistance  Sit to Supine: Minimum assistance;Assist x1(LE's into bed)    Transfers:  Sit to Stand: Minimum assistance      Bed to Chair: Minimum assistance    Balance:  Sitting: Impaired; Without support  Sitting - Static: Good (unsupported)  Sitting - Dynamic: Fair (occasional)  Standing: Impaired  Standing - Static: Fair  Standing - Dynamic : Fair    ADL Intervention: Ot facilitated functional mobility > bathroom x MIN A with patient requiring A for grooming tasks standing at sink as follows. Grooming  Brushing Teeth: Minimum assistance; Compensatory technique training;Proximal stability                             Cognitive Retraining  Safety/Judgement: Insight into deficits    Neuro Re-Education:   OT facilitated PROM and self-ROM for bilateral wrists and digits with patient requiring MIN A 2* decreased dexterity.            Pain:  Pain Scale 1: Numeric (0 - 10)  Pain Intensity 1: 3  Pain Location 1: Shoulder  Pain Orientation 1: Right;Left  Pain Description 1: Aching  Pain Intervention(s) 1: Declines  Activity Tolerance:   VSS  Please refer to the flowsheet for vital signs taken during this treatment. After treatment:   ? Patient left in no apparent distress sitting up in chair  ? Patient left in no apparent distress in bed  ? Call bell left within reach  ? Nursing notified  ? Caregiver present  ?  Bed alarm activated    COMMUNICATION/COLLABORATION:   The patient?s plan of care was discussed with: Physical Therapist and Registered Nurse    Lemuel Watson  Time Calculation: 48 mins

## 2019-05-30 NOTE — WOUND CARE
Wound Care Note: in with patient, Mrs. Chidi White and Jose Kenia patient's nurse: remains on Envision bed with comfort; both report skin continues to be intact, no issues. Will sign off. Please re-consult if needed.   Renee Barnes RN,CWCN

## 2019-05-30 NOTE — PROGRESS NOTES
Problem: Pressure Injury - Risk of  Goal: *Prevention of pressure injury  Description  Document Sha Scale and appropriate interventions in the flowsheet.   Outcome: Progressing Towards Goal     Problem: Patient Education: Go to Patient Education Activity  Goal: Patient/Family Education  Outcome: Progressing Towards Goal     Problem: Patient Education: Go to Patient Education Activity  Goal: Patient/Family Education  Outcome: Progressing Towards Goal

## 2019-05-30 NOTE — PROGRESS NOTES
Problem: Dysphagia (Adult)  Goal: *Acute Goals and Plan of Care (Insert Text)  Description  Speech pathology goals  Initiated 5/16/2019; re-evaluation 5/23/2019   1. Patient will participate in re-evaluation of swallow function within 7 days. Continue 5/23/2019    2) MBS by 5-30-19 met 5/28/2019      Speech Therapy Goals  Revised 5/28/2019  1. Patient will tolerate puree diet with thin liquids without signs/symptoms of aspiration given no cues within 7 day(s). Outcome: Progressing Towards Goal     SPEECH LANGUAGE PATHOLOGY DYSPHAGIA TREATMENT  Patient: Thaddeus Box (8 y.o. male)  Date: 5/30/2019  Diagnosis: Alcohol intoxication ([facility] [address] [F10.929] Alcohol intoxication delirium ([facility] [address]  Procedure(s) (LRB):  C3-4 ANTERIOR CERVICAL DISCECTOMY FUSION (N/A) 9 Days Post-Op  Precautions: swallow Fall, Spinal    ASSESSMENT:  Patient reported slight, daily improvements in swallow function. Patient required min cues to utilize liquid wash after bite of puree. Strong cough noted with large straw, however only throat clear noted with small straw. Provided education regarding benefit of small straw and patient verbalized agreement and understanding. Progression toward goals:  ?         Improving appropriately and progressing toward goals  ? Improving slowly and progressing toward goals  ? Not making progress toward goals and plan of care will be adjusted     PLAN:  Recommendations and Planned Interventions:  --Continue full liquid diet  --Alternate bite/sip  --All meds crushed in puree. If meds cannot be crushed, can they be changed to a comparable medication that can be crushed or in liquid form? --Small straws only; no large straws that come with hospital mug  --Close monitoring for any change in vital signs or respiratory status  --SLP to follow up next week for readiness for diet liberalization  Patient continues to benefit from skilled intervention to address the above impairments.   Continue treatment per established plan of care. Discharge Recommendations:  Inpatient Rehab     SUBJECTIVE:   Patient stated ? I'm scared to drink when I'm alone. ?    OBJECTIVE:   Cognitive and Communication Status:  Neurologic State: Alert  Orientation Level: Oriented X4  Cognition: Appropriate for age attention/concentration  Perception: Appears intact  Perseveration: No perseveration noted  Safety/Judgement: Insight into deficits  Dysphagia Treatment:    P.O. Trials:  Patient Position: upright in bed  Vocal quality prior to P.O.: No impairment;Hoarse  Consistency Presented: Puree; Thin liquid  How Presented: Other (comment); Straw;Spoon(wife-fed)     Bolus Acceptance: No impairment  Bolus Formation/Control: No impairment     Propulsion: No impairment  Oral Residue: None  Initiation of Swallow: Delayed (# of seconds)  Laryngeal Elevation: Decreased  Aspiration Signs/Symptoms: Clear throat;Strong cough  Pharyngeal Phase Characteristics: Double swallow; Suspected pharyngeal residue  Effective Modifications: Alternate liquids/solids  Cues for Modifications: Minimal                  Pain:  Pain Scale 1: Numeric (0 - 10)  Pain Intensity 1: 4  Pain Location 1: Shoulder  After treatment:   ?              Patient left in no apparent distress sitting up in chair  ? Patient left in no apparent distress in bed  ? Call bell left within reach  ? Nursing notified  ? Caregiver present  ? Bed alarm activated    COMMUNICATION/EDUCATION:     The patient?s plan of care including recommendations, planned interventions, and recommended diet changes were discussed with: Registered Nurse.       REMEDIOS Person  Time Calculation: 12 mins

## 2019-05-30 NOTE — PROGRESS NOTES
Problem: Mobility Impaired (Adult and Pediatric)  Goal: *Acute Goals and Plan of Care (Insert Text)  Description  Physical Therapy Goals  Reassessment 5/29/2019  1. Patient will move from supine to sit and sit to supine , scoot up and down and roll side to side in bed with supervision within 7 day(s). 2.  Patient will transfer from bed to chair and chair to bed with contact guard assist using the least restrictive device within 7 day(s). 3.  Patient will perform sit to stand with contact guard assist within 7 day(s). 4.  Patient will ambulate with contact guard assist for 200 feet with the least restrictive device within 7 day(s). 5.  Patient will participate in a strengthening program and be independent within 7 days. Physical Therapy Goals  Initiated 5/18/2019  1. Patient will move from supine to sit and sit to supine , scoot up and down and roll side to side in bed with minimal assistance/contact guard assist within 7 day(s). 2.  Patient will transfer from bed to chair and chair to bed with minimal assistance/contact guard assist using the least restrictive device within 7 day(s). 3.  Patient will perform sit to stand with minimal assistance/contact guard assist within 7 day(s). 4.  Patient will ambulate with minimal assistance/contact guard assist for 150 feet with the least restrictive device within 7 day(s). 5.  Patient will participate in a strengthening program and be independent within 7 days. Outcome: Progressing Towards Goal  PHYSICAL THERAPY TREATMENT  Patient: Leanne Minor (46 y.o. male)  Date: 5/30/2019  Diagnosis: Alcohol intoxication ([facility] [address] [F10.929] Alcohol intoxication delirium ([facility] [address]  Procedure(s) (LRB):  C3-4 ANTERIOR CERVICAL DISCECTOMY FUSION (N/A) 9 Days Post-Op  Precautions: Fall, Spinal  Chart, physical therapy assessment, plan of care and goals were reviewed.     ASSESSMENT:  Based on the objective data described below, the patient presents with Contact guard assistance, Minimum assistance and Assist x1 level overall for transfers. Required VC for increase use of UE's to assist w/ transfer  for [facility] through UE's to scoot to EOB. Gait training completed at Contact guard assistance, Minimum assistance and Assist x1,160 feet total  again this session. Pt and pt's wife feel pt amb somewhat better w/o use of RW d/t limited mobility, sensation, and strength in UE/wrist as well as unfamiliarity of AD, however understand the benefit of use of RW for gait support and safety. Gait trial w/ and w/o RW per request. Pt demonstrated increased path deviations w/ use of RW  2* lack of UE control and  compared to amb w/o walker. Gait overall remains shuffled w/ decreased step clearance and WBOS (although mildly narrower w/ RW). The following are barriers to independence while in acute care:   -Cognitive and/or behavioral: safety awareness, insight into deficits, insight into abilities and fear of falling  -Medical condition: ROM, strength, functional reach, functional endurance, standing balance, precautions, proprioception, sensation and coordination    -Other:       Prior level of function: Independent PTA; caring for 2y/o grandchild     PLAN:  Patient continues to benefit from skilled intervention to address the above impairments. Continue treatment per established plan of care.   Recommend with staff: OOB>chair 3x/day  w/ NSG assist  Recommend next PT session: Gait, U/LE strengthening, balance  Discharge recommendations: Rehab at inpatient facility: patient can tolerate 3 hours of therapy (to regain functional baseline patient requires rehab) for increased rated of recovery and to address UE and gait/balance deficits to regain Omar w/ overall mobility     If above is not an option then recommend: Home health (to increase independence and safety)  24 hour skilled services  24 supervision  physical assist during all functional mobility    Patient's barriers to discharging home, in addition to above impairments: family availability to assist  total assist driving to follow up medical appointment(s)/groceries/obtain medication  entry and exit into the home, patient will require physical assist from medical transport/ambulance  family availability for education/training to then follow up at home  level of physical assist required to maintain patient safety. Equipment recommendations for successful discharge (if) home: TBD        SUBJECTIVE:   Patient stated ? I have trouble managing this. .? OBJECTIVE DATA SUMMARY:   Critical Behavior:  Neurologic State: Alert  Orientation Level: Oriented X4  Cognition: Appropriate decision making, Appropriate safety awareness  Safety/Judgement: Insight into deficits  Functional Mobility Training:  Bed Mobility:     Supine to Sit: Minimum assistance; Additional time;Assist x1;Other (comment)(HOB elevated (pt preference))  Sit to Supine: Minimum assistance;Assist x1(LE's into bed)           Transfers:  Sit to Stand: Minimum assistance;Assist x1(from low bed height)  Stand to Sit: Contact guard assistance;Minimum assistance(Min A for eccentric control when sitting)                             Balance:  Sitting: Impaired  Sitting - Static: Good (unsupported)  Sitting - Dynamic: Fair (occasional)  Standing: Impaired  Standing - Static: Constant support(w/ RW)  Standing - Dynamic : Fair  Ambulation/Gait Training:  Distance (ft): 160 Feet (ft)  Assistive Device: Gait belt;Walker, rolling( 80Ft w/ RW; 80 ft w/ RW )  Ambulation - Level of Assistance: Contact guard assistance;Minimal assistance        Gait Abnormalities: Ataxic; Altered arm swing;Decreased step clearance; Path deviations; Shuffling gait;Trunk sway increased(altered arm swing w/o use of RW)        Base of Support: Other (comment)(widened w// RW; narrower w/ RW use)     Speed/Olive: Pace decreased (<100 feet/min); Shuffled  Step Length: Left shortened;Right shortened Pain:  No c/o pain    Activity Tolerance:   Good  Please refer to the flowsheet for vital signs taken during this treatment.     After treatment patient left:   Supine in bed  Bed in low position  Call light within reach  RN notified  Side rails x 3     COMMUNICATION/COLLABORATION:   The patient?s plan of care was discussed with: Registered Nurse    Quang NGUYEN Means,PTA   Time Calculation: 30 mins

## 2019-05-30 NOTE — PROGRESS NOTES
Spiritual Care Assessment/Progress Note  [facility]      NAME: Alethea Matta      MRN: [id number]  AGE: 78 y.o. SEX: male  Restoration Affiliation: Episcopal   Language: English     5/30/2019     Total Time (in minutes): 10     Spiritual Assessment begun in [facility] 5W1 ORTHO SPINE through conversation with:         []Patient        [x] Family    [] Friend(s)        Reason for Consult: Code Blue/Elena     Spiritual beliefs: (Please include comment if needed)     [] Identifies with a alexandra tradition:         [] Supported by a alexandra community:            [] Claims no spiritual orientation:           [] Seeking spiritual identity:                [] Adheres to an individual form of spirituality:           [x] Not able to assess:                           Identified resources for coping:      [] Prayer                               [] Music                  [] Guided Imagery     [] Family/friends                 [] Pet visits     [] Devotional reading                         [x] Unknown     [] Other:                                               Interventions offered during this visit: (See comments for more details)    Patient Interventions: Other (comment)(Pastoral Care Note)     Family/Friend(s):  Affirmation of emotions/emotional suffering, Normalization of emotional/spiritual concerns     Plan of Care:     [] Support spiritual and/or cultural needs    [] Support AMD and/or advance care planning process      [] Support grieving process   [] Coordinate Rites and/or Rituals    [] Coordination with community clergy   [] No spiritual needs identified at this time   [] Detailed Plan of Care below (See Comments)  [] Make referral to Music Therapy  [] Make referral to Pet Therapy     [] Make referral to Addiction services  [] Make referral to [facility]  [] Make referral to Spiritual Care Partner  [] No future visits requested        [x] Follow up visits as needed     Comments:  responded to Code Blue that moved to RRT. Pt's wife was at bedside, though she did not want to speak at the  at this time. Please contact [phone] [facility] for further support.  follow up as needed.      Courtney Westbrook, MACE   [phone]

## 2019-05-30 NOTE — PROGRESS NOTES
Rapid response was called due to episode of unresponsiveness. Pt was siiting in the chair next to his wife and having conversation and become unresponsive, initially called code blue and changed in to RR. Pt started responding spontaneously. Labs drawn and placed on remote tele.

## 2019-05-30 NOTE — PROGRESS NOTES
Neurosurgery Spine Progress Note  Lizbet Lyles, AGACNP-BC  Work Cell: [phone]    Post Op Day: 9 Day Post-Op    May 30, 2019 1:17 PM     Ozzy     HPI:      Ozzy  is a 78 y.o. male with history of Meniere's disease, hypertension and GERD who was found down in his backyard unresponsive with foam at the mouth. EMS was called and patient was brought to [facility] ED and he was found to have an alcohol level of 162. Neurology was consulted for possible seizure and he was started on Keppra. After recovery from his intoxication and lethargy his family noticed he had weakness in his arms. It was also noted that he had urinary retention and difficulty swallowing. MRI of the cervical spine was completed which showed disc herniation at C3-C4 level with some edema in the central cord. The neurosurgery service was consulted for evaluation. Subjective      No events overnight. Sore in his shoulders. tolerating full liquids. No bm in several days. He denies headache, dizziness, chest pain, sob, abdominal pain, fever, chills, or nausea/vomiting. Objective:     Physical Exam:  General:  Alert and oriented. No acute distress. Respiratory:  Breathing unlabored. Equal chest expansion   Abdomen:   CV: Soft, non-tender, non-distended   No edema appreciated in the extremities   Neurologic:        Musculoskeletal:  Speech fluent. No facial droop. Follows commands. BUENO. Dense numbness in hands with impaired proprioception. Motor: deltoids 4/5, biceps 3/5, triceps 3/5, 2/5 in his  strength bilaterally. Lower extremities approximately 4+/5. - nair. + babinski. Calves soft, nontender upon palpation and with passive stretch. Moves both upper and lower extremities. Incision: Cervical incision with steristrips intact. No significant erythema or swelling. No active drainage noted.              Vital Signs:    Patient Vitals for the past 8 hrs:   BP Temp Pulse Resp SpO2 Height Weight 19 0823 156/83 98 °F (36.7 °C) 68 16 96 % -- --   19 0737 -- -- -- -- -- 5' 8\" (1.727 m) 74 kg (163 lb 1.6 oz)   19 0307 132/80 98.3 °F (36.8 °C) 68 16 96 % -- --     Temp (24hrs), Av.9 °F (36.6 °C), Min:97.4 °F (36.3 °C), Max:98.3 °F (36.8 °C)      Intake/Output:  No intake/output data recorded. 1901 - 700  In: -   Out:  [Urine:]    Pain Control:   Pain Assessment  Pain Scale 1: Numeric (0 - 10)  Pain Intensity 1: 3  Pain Onset 1: postop  Pain Location 1: Shoulder  Pain Orientation 1: Right, Left  Pain Description 1: Aching  Pain Intervention(s) 1: Declines    LAB:    Recent Labs     19  0452   HCT 45.6   HGB 14.7     Lab Results   Component Value Date/Time    Sodium 137 2019 04:52 AM    Potassium 3.9 2019 04:52 AM    Chloride 103 2019 04:52 AM    CO2 30 2019 04:52 AM    Glucose 97 2019 04:52 AM    BUN 18 2019 04:52 AM    Creatinine 0.65 (L) 2019 04:52 AM    Calcium 8.9 2019 04:52 AM       PT/OT:   Gait:  Gait  Base of Support: Narrowed  Speed/Olive: Pace decreased (<100 feet/min)  Step Length: Right shortened, Left shortened  Gait Abnormalities: Shuffling gait, Trunk sway increased  Ambulation - Level of Assistance: Minimal assistance, Assist x1  Distance (ft): 160 Feet (ft)  Assistive Device: Brace/Splint, Gait belt, Walker, rolling                   Assessment/Plan      1. Central Cord Syndrome with spinal cord signal change at C3-4 after GLF   -MRI revealed C3-4 herniated disc with cord edema and cervical stenosis   -POD#9 C3-4 ANTERIOR CERVICAL DISCECTOMY FUSION with Dr. Shyla Bahena   -Continue PT/OT. -Physiatry consult- Discussed with Dr. Laura Ricardo who evaluated patient this am. He agrees patient is not safe to go home as he still requires bladder/bowel training,  PT, SLP therapy, and needs assistance with all higher level ADLs.  Dr. Radha Hickman plans to start appeal process this am.   -Pain control- PRN dilaudid, tylenol   -d/c decadron   -Incentive Spirometer   -Tolerating diet   -VTE Prophylaxes -lovenox started 5/26    2. Neurogenic bladder   -due to #1   -Lofton replaced on 05/24 due to failed voiding trial- voiding trial recommended in rehab    3. Alcohol abuse   -counseling as able   -thiamine, folic acid    4. Dysphagia   -SLP consulted- patient with severe pharyngeal dysphagia. patient placed NPO  and Dobhoff tube placed 5/23    -Cleared MBS yesterday and SLP recommending trial of clear liquid diet. Dobhoff removed 5/28   -aspiration precautions    5. Possible seizure   -? Etoh induced. Brain MRI changes seen in the left medial hippocampus- needs repeat imaging in 1 month with neuro   -tolerating low dose Keppra. Neuro recommends continuing on discharge   -follow-up with Dr. Sj Akbar in 1 month- discussed with patient and wife    6. Hypertension   -nocturnal elevations noted. Currently on hctz 25 mg    -manage pain, prn hydralazine   -Hospitalist following    7. Menieres disease   -patient on hctz to help with sodium diuresis. Can consider meclizine if he becomes symptomatic with dizziness    8. Possible CVA   -MRA head/neck revealed moderate left P2 stenosis without occlusion.   -discussed with hospitalist- ok to start ASA on POD#3 (Friday). ASA restarted 05/24    9. Constipation/ Neurogenic bowel   -due to #1   -Continue stool softener and laxative daily. schedule bisacodyl suppository every other day. Hold for loose stool or diarrhea    Plan above discussed with Dr. Nana Bunnell Dr. Norton Opitz, , wife at bedside    Discharge To:  Rehab for best chance at functional recovery- pending peer to peer by Dr. Lizette Lizarraga    Continue to strongly recommend discharge to inpatient rehab.  Pt was highly functional prior to this injury, taking care of himself, doing aerobics at the [facility], etc. He has multiple medical issues from this injury and will benefit from all 3 therapy disciplines at inpt rehab -PT/OT/Speech. Due to his SCI, he needs aggressive rehab to regain function. Awaiting physiatry consult.     Signed By: Eulis Eisenmenger, NP

## 2019-05-30 NOTE — PROGRESS NOTES
REPORT OF CONSULTATION Name: Marco Cerda : 1950 MRN: [id number] DATE: 2019 ADMISSION DATE: 2019 Location: Room 539 ATTENDING PHYSICIAN: Isaac Arce MD 
REFERRING PHYSICIAN: Edi Colin MD 
LOCATION: [facility] room 229 SUMMARY FINDINGS AND RECOMMENDATIONS: 
Mr. Jason Langley is a generally very healthy  active 39-year-old right-handed male without any previous impairment of upper extremity function who has residual central cord incomplete spinal cord injury from an acute C3-4 disc herniation/cervical 
stenosis, now 9 days status post ACDF. He had considerable cord edema. He has had significant improvement in lower extremity strength but still is very imbalanced. He also has Ménière's disease with pre-existing imbalance tendencies  and currently is needing gait belt/contact-guard for all ambulation and standing activity. He has severe distal greater than proximal bilateral upper extremity weakness with mild myelopathic features and has a very weak grasp and pinch and cannot lift his shoulders up beyond 80 degrees actively and generally has 3- shoulder strength and 3/5 elbow flexion extension and 2+ hand intrinsic strength already with some atrophy developing. He has a neurogenic bowel and bladder and failed his initial voiding trial last Friday and currently has a Lofton and is having difficulty with bowel evacuation, complain of constipation and reporting only one bowel movement since his admission. His bowel and bladder retraining needs cannot be meet in a nursing home setting. He is at very high fall risk with total inability to protect himself or reduce fall injury  if he were to fall without adequate upper extremity strength and function. He also has impaired proprioception and tactile sensibility deficits in the hands and could not manipulate a Lofton catheter for self caths at home. Partial vocal cord paralysis was identified on direct laryngoscopy. His speech is doing better and it he passed his modified barium swallow yesterday but he still reports occasional coughing and strangling  with liquids and is on thickened liquids and needs a very strict, SLP supervised swallowing program and retraining but more importantly supervised feeding which she would not be able to obtain in a nursing home setting. He is progressing well with his gait but still has balance problems and has myelopathic features in the lower extremities with hyperreflexia and upgoing Babinski responses but not really a lot of spastic tone  and I do not think he needs anti-spasticity meds at this time. He meets Medicare criteria for inpatient rehab the following needs: 
2+ hours a day of upper extremity rehab which would include strengthening and standing upper extremity work as part of his physical therapy. He would benefit from advanced technologies for his upper extremities which include biomass H2 100 e-stim orthotic, Rapael biofeedback glove training, Armeo power assisted training, and FDS functional Estimulation upper extremity cycling. 1 hour physical therapy a day for gait and balance training and strengthening and standing I-ADL and endurance training 24-hour rehab nursing for a bowel and bladder retraining program as well as fall prevention precautions and reinforcement of his PT and OT goals SLP for swallowing monitoring and therapy as needed  Daily Physiatry assessment  and interdisciplinary coordination of care.  
  
Currently he is not safe to be at home both for his high fall risk and the min to mod assist that he needs to set up (has weak core strength) and currently is needing contact-guard with a gait belt with all upright standing and walking activities which cannot be provided at home, but more importantly, he needs highly skilled vigilant bowel and bladder retraining program which  he could not get sufficiently in a nursing home setting. Interdisciplinary, intense inpatient rehabilitation (IRF) setting is medically necessary for Mr. Mircale Quiroz to maximum recover from his incomplete cervical spinal cord injury and transfer to an inpatient rehab facility is very strongly recommended. Above discussed with Brandee Washington, Dr. Tejas Call PA. Will assist in any way I can and an appeal for insurance approval. 
   
  
Thank you for the opportunity to participate in Mr. Bon Saini care.  
  
Quynh Muñoz MD 
  
cc: MD Juancho Renteria MD, [address]

## 2019-05-30 NOTE — PROGRESS NOTES
POD 9  Episode of unresponsiveness this afternoon, code blue called  Patient recovered spontaneously  No evidence of seizure activity, no evidence of syncopal event  afeb VSS  Decadron 2mg/day  Alert  Speech improved  Hand strength improving  S/P: ACDF 3/4 for cervical stenosis that resulted in spinal cord injury - central cord syndrome  Episode of unresponsiveness today, glucose normal  Labs are pending  EKG wnl  Despite repeated documentation that patient would greatly benefit from aggressive inpatient rehab for best chance at full recovery, [employer] has denied inpatient rehab  Family is going to appeal

## 2019-05-30 NOTE — PROGRESS NOTES
PHILIP was told that Dr Baljinder Caraballo completed a Peer to Peer with [facility] and this pt was denied again. Inocente Roberts, from [address] [place] [address] gave this CM a phone number that the family can call if they want to appeal themselves. CM met with patient and wife and gave them this number. Pt's wife is interested in renting a hospital bed for pt. CM gave the wife the phone number to [facility] to set up the bed since it will be self pay. Pt's wife will get back with CM regarding which home health agency they want. Will follow.  Ellyn Hurd

## 2019-05-30 NOTE — CONSULTS
Report of consultation      ATTENDING PHYSICIAN: Isaac Morrison MD  REFERRING PHYSICIAN: Jenifer Gardner MD  LOCATION: [facility]    SUMMARY FINDINGS AND RECOMMENDATIONS:   Mr. Maira Raygoza is a generally very healthy active 69-year-old right-handed male without any previous impairment of upper extremity function who has residual central cord incomplete spinal cord injury from an acute C3-4 disc herniation/cervical   stenosis, now 9 days status post ACDF. He had considerable cord edema. He has had significant improvement in lower extremity strength but still is very imbalanced. He also has Ménière's disease with pre-existing imbalance tendencies and currently is needing gait belt/contact-guard for all ambulation and standing activity. He has severe distal greater than proximal bilateral upper extremity weakness with mild myelopathic features and has a very weak grasp and pinch and cannot lift his shoulders up beyond 80 degrees actively and generally has 3- shoulder strength and 3/5 elbow flexion extension and 2+ hand intrinsic strength already with some atrophy developing. He has a neurogenic bowel and bladder and failed his initial voiding trial last Friday and currently has a Lofton and is having difficulty with bowel evacuation, complain of constipation and reporting only one bowel movement since his admission. His bowel and bladder retraining needs cannot be meet in a nursing home setting. He is at very high fall risk with total inability to protect himself or reduce fall injury if he were to fall without adequate upper extremity strength and function. He also has impaired proprioception and tactile sensibility deficits in the hands and could not manipulate a Lofton catheter for self caths at home. Partial vocal cord paralysis was identified on direct laryngoscopy.   His speech is doing better and it he passed his modified barium swallow yesterday but he still reports occasional coughing and strangling with liquids and is on thickened liquids and needs a very strict, SLP supervised swallowing program and retraining but more importantly supervised feeding which she would not be able to obtain in a nursing home setting. He is progressing well with his gait but still has balance problems and has myelopathic features in the lower extremities with hyperreflexia and upgoing Babinski responses but not really a lot of spastic tone and I do not think he needs anti-spasticity meds at this time. He meets Medicare criteria for inpatient rehab the following needs:  -2+ hours a day of upper extremity rehab which would include strengthening and standing upper extremity work as part of his physical therapy. He would benefit from advanced technologies for his upper extremities which include Fisgo 100 e-stim orthotic, Rapael biofeedback glove training, Armeo power assisted training, and FDS functional E-stimulation upper extremity cycling.  -1 hour physical therapy a day for gait and balance training and strengthening and standing I-ADL and endurance training  -24-hour rehab nursing for a bowel and bladder retraining program as well as fall prevention precautions and reinforcement of his PT and OT goals  -SLP for swallowing monitoring and therapy as needed   Daily Physiatry assessment and interdisciplinary coordination of care. Currently he is not safe to be at home both for his high fall risk and the min to mod assist that he needs to set up (has weak core strength) and currently is needing contact-guard with a gait belt with all upright standing and walking activities which cannot be provided at home, but more importantly, he needs highly skilled vigilant bowel and bladder retraining program which he could not get sufficiently in a nursing home setting.     Interdisciplinary, intense inpatient rehabilitation (IRF) setting is medically necessary for Mr. Oris Sacks to maximum recover from his incomplete cervical spinal cord injury and transfer to an inpatient rehab facility is very strongly recommended. Above discussed with Boo Andino, Dr. Nakia PENNINGTON. Will assist in any way I can and an appeal for insurance approval.      Thank you for the opportunity to participate in Mr. Charline Fernandez toyin.     Dante Orozco MD    cc: MD Jennifer Salvador MD, [address]

## 2019-05-30 NOTE — PROGRESS NOTES
Hospitalist Progress Note                               Gera Carrillo MD                                     Answering service: [phone]                               OR [phone] from in house phone                                         Date of Service:  2019  NAME:  Mallory Jett  :  1950  MRN:  [id number]      Admission Summary:   79 Y/O gentleman with a PMH significant for Meniere's disease, hypertension and GERD  was found down in his backyard unresponsive with foam at the mouth. EMS was called and patient was brought to [facility] ED and he was found to have an alcohol level of 162. Neurology was consulted for possible seizure and he was started on Keppra. After recovery from his intoxication and lethargy his family noticed he had weakness in his arms. It was also noted that he had urinary retention and difficulty swallowing. MRI of the cervical spine was completed which showed disc herniation at C3-C4 level with some edema in the central cord. Reason for follow up:       CC:Unresponsiveness    All events in the past 24 hours including Rapid Response called for brief episode of unresponsiveness 19 am, reviewed in their entirety. Prior to episode, patient was in no acute distress in cervical collar. Denied any new complaints. Assessment & Plan:     1) CNS: Central cord Syndrome with spinal cord Canal change at C3-4 after a fall at home. MRI positive for C3-4 herniated disc with cord edema and cervical stenosis. Day # 9 S/P C3-4 Anterior Cervical Discectomy Fusion. 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 Menieres Disease. Continue low dose Keppra, Aspirin, as needed Meclizine and neurochecks. Neurology and Neurosurgery F/Us noted and appreciated. 2) : S/P Lofton Catheter placement for  Neurogenic bladder due to the probable Central cord syndrome.  Outpatient voiding trial.    3) CVS: Primary Hypertension. 4) GI: Constipation probably due to Neurogenic bowel. Continue bowel regimen. H/O GERD. Resolving dysphagia. Trial of diet. 5) Social: Alcohol Use Disorder. Daily MVI/minerals, Thiamine and Folic Acid. Cessation counselling done. 6) Prophylaxis: DVT. 7) Rehab: Daily PT/OT and speech therapy as tolerated. 8) Directives: Full Code with a very guarded prognosis. D/W patient. 9) Plan: Patient will benefit from Inpatient Rehab for ongoing therapies. However, [facility] declined. Patient and wife reviewing other options. At increased risk of decompensation and readmission. Discussed in detail with patient and patient's wife Trevor Doan who can be reached at [phone]. D/W Nursing. Hospital Problems  Date Reviewed: 5/20/2019          Codes Class Noted POA    Spinal cord injury at C1-C4 level [facility]) ICD-10-CM: S14.101A  ICD-9-CM: 952.00  5/22/2019 Unknown        * (Principal) Alcohol intoxication delirium ([facility] [address] ICD-10-CM: F10.121  ICD-9-CM: 291.0  5/15/2019 Yes        Alcohol intoxication ([facility] [address] ICD-10-CM: Y15.849  ICD-9-CM: 305.00  5/15/2019 Unknown                Physical Examination:      Last 24hrs VS reviewed since prior progress note. Most recent are:  Visit Vitals  /76 (BP 1 Location: Right arm, BP Patient Position: At rest)   Pulse 78   Temp 97.8 °F (36.6 °C)   Resp 16   Ht 5' 8\" (1.727 m)   Wt 74 kg (163 lb 1.6 oz)   SpO2 97%   BMI 24.80 kg/m²           Constitutional:  No acute distress, cooperative, pleasant    HEENT: Head is a traumatic,  Un icteric sclera. Pink conjunctiva,no erythema or discharge. Oral mucous moist, oropharynx benign. Neck supple,   NECK: Cervical Collar in Situ. Resp:  Decreased air entry Bibasilarly   CV:  Regular rhythm, normal rate, no murmurs, gallops, rubs    GI:  Soft, non distended, non tender.  normoactive bowel sounds, no hepatosplenomegaly    :  No CVA or suprapubic tenderness   Skin  :  No erythema,rash,bullae,dipigmentation     Musculoskeletal:  Bipedal edema. Neurologic:  Awake, alert and oriented. Intake/Output Summary (Last 24 hours) at 5/30/2019 1429  Last data filed at 5/30/2019 2052  Gross per 24 hour   Intake --   Output 1260 ml   Net -1260 ml          Labs:     Recent Labs     05/29/19  0452   WBC 8.5   HGB 14.7   HCT 45.6        Recent Labs     05/29/19  0452 05/28/19  0336    138   K 3.9 3.6    102   CO2 30 30   BUN 18 17   CREA 0.65* 0.60*   GLU 97 116*   CA 8.9 8.6   MG 2.2 2.1   PHOS 4.3 4.0     No results for input(s): SGOT, GPT, ALT, AP, TBIL, TBILI, TP, ALB, GLOB, GGT, AML, LPSE in the last 72 hours. No lab exists for component: AMYP, HLPSE  No results for input(s): INR, PTP, APTT in the last 72 hours. No lab exists for component: INREXT   No results for input(s): FE, TIBC, PSAT, FERR in the last 72 hours. No results found for: FOL, RBCF   No results for input(s): PH, PCO2, PO2 in the last 72 hours. No results for input(s): CPK, CKNDX, TROIQ in the last 72 hours.     No lab exists for component: CPKMB  Lab Results   Component Value Date/Time    Cholesterol, total 129 05/15/2019 01:54 AM    HDL Cholesterol 54 05/15/2019 01:54 AM    LDL, calculated 65 05/15/2019 01:54 AM    Triglyceride 50 05/15/2019 01:54 AM    CHOL/HDL Ratio 2.4 05/15/2019 01:54 AM     Lab Results   Component Value Date/Time    Glucose (POC) 123 (H) 05/30/2019 12:32 PM    Glucose (POC) 119 (H) 05/28/2019 06:56 AM    Glucose (POC) 148 (H) 05/18/2019 04:47 PM    Glucose (POC) 170 (H) 05/18/2019 12:37 PM    Glucose (POC) 114 (H) 05/17/2019 04:42 PM     Lab Results   Component Value Date/Time    Color YELLOW/STRAW 05/14/2019 08:14 PM    Appearance CLEAR 05/14/2019 08:14 PM    Specific gravity 1.016 05/14/2019 08:14 PM    pH (UA) 5.5 05/14/2019 08:14 PM    Protein NEGATIVE  05/14/2019 08:14 PM    Glucose NEGATIVE  05/14/2019 08:14 PM    Ketone 15 (A) 05/14/2019 08:14 PM Bilirubin NEGATIVE  05/14/2019 08:14 PM    Urobilinogen 0.2 05/14/2019 08:14 PM    Nitrites NEGATIVE  05/14/2019 08:14 PM    Leukocyte Esterase NEGATIVE  05/14/2019 08:14 PM         Medications Reviewed:     Current Facility-Administered Medications   Medication Dose Route Frequency    hydroCHLOROthiazide (HYDRODIURIL) tablet 25 mg  25 mg Oral DAILY    [START ON 6/1/2019] bisacodyl (DULCOLAX) suppository 10 mg  10 mg Rectal EVERY OTHER DAY    [START ON 5/31/2019] pantoprazole (PROTONIX) tablet 40 mg  40 mg Oral ACB    dexamethasone (DECADRON) tablet 1 mg  1 mg Oral DAILY    levETIRAcetam (KEPPRA) oral solution 500 mg  500 mg Oral Q12H    enoxaparin (LOVENOX) injection 40 mg  40 mg SubCUTAneous Q24H    lactulose (CHRONULAC) 10 gram/15 mL solution 45 mL  30 g Oral DAILY PRN    mineral oil (FLEET) enema   Rectal PRN    aspirin chewable tablet 81 mg  81 mg Oral DAILY    tamsulosin (FLOMAX) capsule 0.4 mg  0.4 mg Oral DAILY    lidocaine (XYLOCAINE) 2 % viscous solution 15 mL  15 mL Other PRN    polyethylene glycol (MIRALAX) packet 17 g  17 g Oral DAILY    docusate sodium (COLACE) capsule 100 mg  100 mg Oral BID    senna (SENOKOT) tablet 17.2 mg  2 Tab Oral DAILY    lidocaine (LIDODERM) 5 % patch 2 Patch  2 Patch TransDERmal Q24H    acetaminophen (TYLENOL) solution 650 mg  650 mg Per NG tube Q6H    meclizine (ANTIVERT) tablet 12.5 mg  12.5 mg Oral Q6H PRN    HYDROmorphone (PF) (DILAUDID) injection 0.5 mg  0.5 mg IntraVENous Q3H PRN    sodium chloride (NS) flush 5-40 mL  5-40 mL IntraVENous Q8H    sodium chloride (NS) flush 5-40 mL  5-40 mL IntraVENous PRN    naloxone (NARCAN) injection 0.4 mg  0.4 mg IntraVENous PRN    oxyCODONE IR (ROXICODONE) tablet 5 mg  5 mg Oral Q3H PRN    HYDROmorphone (DILAUDID) tablet 4 mg  4 mg Oral Q3H PRN    acetaminophen (TYLENOL) suppository 650 mg  650 mg Rectal D6S PRN    folic acid (FOLVITE) tablet 1 mg  1 mg Oral DAILY    thiamine HCL (B-1) tablet 100 mg  100 mg Oral DAILY    pantothenic ac-min oil-pet,hyd (AQUAPHOR) 41 % ointment   Topical PRN    hydrALAZINE (APRESOLINE) 20 mg/mL injection 10 mg  10 mg IntraVENous Q4H PRN    metoprolol (LOPRESSOR) injection 2.5 mg  2.5 mg IntraVENous Q6H PRN    sodium chloride (NS) flush 5-40 mL  5-40 mL IntraVENous Q8H    sodium chloride (NS) flush 5-40 mL  5-40 mL IntraVENous PRN    ondansetron (ZOFRAN) injection 4 mg  4 mg IntraVENous Q4H PRN    naloxone (NARCAN) injection 0.4 mg  0.4 mg IntraVENous PRN    bisacodyl (DULCOLAX) tablet 5 mg  5 mg Oral DAILY PRN    acetaminophen (TYLENOL) tablet 650 mg  650 mg Oral Q4H PRN    prochlorperazine (COMPAZINE) tablet 10 mg  10 mg Oral Q6H PRN    phenol throat spray (CHLORASEPTIC) 1 Spray  1 Spray Oral PRN     ______________________________________________________________________  EXPECTED LENGTH OF STAY: 4d 21h  ACTUAL LENGTH OF STAY:          15                 Obi Loyd MD

## 2019-05-31 LAB
ANION GAP SERPL CALC-SCNC: 7 MMOL/L (ref 5–15)
BASOPHILS # BLD: 0 K/UL (ref 0–0.1)
BASOPHILS NFR BLD: 0 % (ref 0–1)
BUN SERPL-MCNC: 18 MG/DL (ref 6–20)
BUN/CREAT SERPL: 29 (ref 12–20)
CALCIUM SERPL-MCNC: 8.7 MG/DL (ref 8.5–10.1)
CHLORIDE SERPL-SCNC: 99 MMOL/L (ref 97–108)
CO2 SERPL-SCNC: 28 MMOL/L (ref 21–32)
CREAT SERPL-MCNC: 0.63 MG/DL (ref 0.7–1.3)
DIFFERENTIAL METHOD BLD: ABNORMAL
EOSINOPHIL # BLD: 0 K/UL (ref 0–0.4)
EOSINOPHIL NFR BLD: 0 % (ref 0–7)
ERYTHROCYTE [DISTWIDTH] IN BLOOD BY AUTOMATED COUNT: 13.9 % (ref 11.5–14.5)
GLUCOSE SERPL-MCNC: 104 MG/DL (ref 65–100)
HCT VFR BLD AUTO: 41 % (ref 36.6–50.3)
HGB BLD-MCNC: 13.3 G/DL (ref 12.1–17)
IMM GRANULOCYTES # BLD AUTO: 0.1 K/UL (ref 0–0.04)
IMM GRANULOCYTES NFR BLD AUTO: 1 % (ref 0–0.5)
LYMPHOCYTES # BLD: 1.4 K/UL (ref 0.8–3.5)
LYMPHOCYTES NFR BLD: 10 % (ref 12–49)
MCH RBC QN AUTO: 29.4 PG (ref 26–34)
MCHC RBC AUTO-ENTMCNC: 32.4 G/DL (ref 30–36.5)
MCV RBC AUTO: 90.5 FL (ref 80–99)
MONOCYTES # BLD: 0.8 K/UL (ref 0–1)
MONOCYTES NFR BLD: 6 % (ref 5–13)
NEUTS SEG # BLD: 11.3 K/UL (ref 1.8–8)
NEUTS SEG NFR BLD: 83 % (ref 32–75)
NRBC # BLD: 0 K/UL (ref 0–0.01)
NRBC BLD-RTO: 0 PER 100 WBC
PLATELET # BLD AUTO: 202 K/UL (ref 150–400)
PMV BLD AUTO: 9.9 FL (ref 8.9–12.9)
POTASSIUM SERPL-SCNC: 3.5 MMOL/L (ref 3.5–5.1)
RBC # BLD AUTO: 4.53 M/UL (ref 4.1–5.7)
SODIUM SERPL-SCNC: 134 MMOL/L (ref 136–145)
WBC # BLD AUTO: 13.7 K/UL (ref 4.1–11.1)

## 2019-05-31 PROCEDURE — 94760 N-INVAS EAR/PLS OXIMETRY 1: CPT

## 2019-05-31 PROCEDURE — 74011250636 HC RX REV CODE- 250/636: Performed by: NURSE PRACTITIONER

## 2019-05-31 PROCEDURE — 74011250637 HC RX REV CODE- 250/637: Performed by: INTERNAL MEDICINE

## 2019-05-31 PROCEDURE — 74011250636 HC RX REV CODE- 250/636: Performed by: NEUROLOGICAL SURGERY

## 2019-05-31 PROCEDURE — 74011250637 HC RX REV CODE- 250/637: Performed by: NEUROLOGICAL SURGERY

## 2019-05-31 PROCEDURE — 80048 BASIC METABOLIC PNL TOTAL CA: CPT

## 2019-05-31 PROCEDURE — 85025 COMPLETE CBC W/AUTO DIFF WBC: CPT

## 2019-05-31 PROCEDURE — 97530 THERAPEUTIC ACTIVITIES: CPT

## 2019-05-31 PROCEDURE — 65660000000 HC RM CCU STEPDOWN

## 2019-05-31 PROCEDURE — 97112 NEUROMUSCULAR REEDUCATION: CPT

## 2019-05-31 PROCEDURE — 97116 GAIT TRAINING THERAPY: CPT

## 2019-05-31 PROCEDURE — 36415 COLL VENOUS BLD VENIPUNCTURE: CPT

## 2019-05-31 PROCEDURE — 74011250637 HC RX REV CODE- 250/637: Performed by: NURSE PRACTITIONER

## 2019-05-31 PROCEDURE — 74011000250 HC RX REV CODE- 250: Performed by: INTERNAL MEDICINE

## 2019-05-31 RX ADMIN — LEVETIRACETAM 750 MG: 100 SOLUTION ORAL at 18:07

## 2019-05-31 RX ADMIN — ENOXAPARIN SODIUM 40 MG: 40 INJECTION SUBCUTANEOUS at 10:58

## 2019-05-31 RX ADMIN — ASPIRIN 81 MG 81 MG: 81 TABLET ORAL at 10:50

## 2019-05-31 RX ADMIN — LEVETIRACETAM 750 MG: 100 SOLUTION ORAL at 06:45

## 2019-05-31 RX ADMIN — Medication 10 ML: at 22:09

## 2019-05-31 RX ADMIN — DEXAMETHASONE 1 MG: 1 TABLET ORAL at 10:58

## 2019-05-31 RX ADMIN — Medication 10 ML: at 06:00

## 2019-05-31 RX ADMIN — OXYCODONE HYDROCHLORIDE 5 MG: 5 TABLET ORAL at 22:08

## 2019-05-31 RX ADMIN — DOCUSATE SODIUM 100 MG: 50 LIQUID ORAL at 10:54

## 2019-05-31 RX ADMIN — FOLIC ACID 1 MG: 1 TABLET ORAL at 10:50

## 2019-05-31 RX ADMIN — POLYETHYLENE GLYCOL 3350 17 G: 17 POWDER, FOR SOLUTION ORAL at 10:53

## 2019-05-31 RX ADMIN — SENNOSIDES 17.2 MG: 8.6 TABLET, FILM COATED ORAL at 10:50

## 2019-05-31 RX ADMIN — Medication 100 MG: at 10:50

## 2019-05-31 RX ADMIN — DOCUSATE SODIUM 100 MG: 50 LIQUID ORAL at 18:07

## 2019-05-31 RX ADMIN — TAMSULOSIN HYDROCHLORIDE 0.4 MG: 0.4 CAPSULE ORAL at 10:50

## 2019-05-31 RX ADMIN — PANTOPRAZOLE SODIUM 40 MG: 40 TABLET, DELAYED RELEASE ORAL at 06:46

## 2019-05-31 NOTE — PROGRESS NOTES
Neurosurgery Spine Progress Note  Marilyn Heller AGACNP-BC  Work Cell: [phone]    Post Op Day: 8 Day Post-Op    May 31, 2019 1:17 PM     Marcello Rothman    HPI:      Marcello Rothman is a 78 y.o. male with history of Meniere's disease, hypertension and GERD who was found down in his backyard unresponsive with foam at the mouth. EMS was called and patient was brought to [facility] ED and he was found to have an alcohol level of 162. Neurology was consulted for possible seizure and he was started on Keppra. After recovery from his intoxication and lethargy his family noticed he had weakness in his arms. It was also noted that he had urinary retention and difficulty swallowing. MRI of the cervical spine was completed which showed disc herniation at C3-C4 level with some edema in the central cord. The neurosurgery service was consulted for evaluation. Subjective      Patient doing well this am.  Still no bm in several days. He denies headache, dizziness, chest pain, sob, abdominal pain, fever, chills, or nausea/vomiting. Objective:     Physical Exam:  General:  Alert and oriented. No acute distress. Respiratory:  Breathing unlabored. Equal chest expansion   Abdomen:   CV: Soft, non-tender, non-distended   No edema appreciated in the extremities   Neurologic:        Musculoskeletal:  Speech fluent. No facial droop. Follows commands. BUENO. Dense numbness in hands with impaired proprioception. Motor: deltoids 4/5, biceps 3/5, triceps 3/5, 2/5 in his  strength bilaterally. Lower extremities approximately 4+/5. - nair. + babinski. Calves soft, nontender upon palpation and with passive stretch. Moves both upper and lower extremities. Incision: Cervical incision with steristrips intact. No significant erythema or swelling. No active drainage noted.              Vital Signs:    Patient Vitals for the past 8 hrs:   BP Temp Pulse Resp SpO2 Height Weight   05/31/19 1006 111/69 -- 87 -- -- -- --   19 1001 105/67 -- 87 -- 97 % -- --   19 0958 132/71 -- 73 -- 96 % -- --   19 0955 126/74 -- 74 -- 96 % -- --   19 0940 102/66 97.7 °F (36.5 °C) 77 16 97 % -- --   19 0633 -- -- -- -- -- 5' 8\" (1.727 m) 73.5 kg (162 lb 0.6 oz)     Temp (24hrs), Av °F (36.7 °C), Min:97.6 °F (36.4 °C), Max:98.4 °F (36.9 °C)      Intake/Output:  No intake/output data recorded.  1901 -  0700  In: -   Out: 1050 [Urine:1050]    Pain Control:   Pain Assessment  Pain Scale 1: Numeric (0 - 10)  Pain Intensity 1: 6  Pain Onset 1: postop  Pain Location 1: Neck, Shoulder  Pain Orientation 1: Left, Right  Pain Description 1: Aching  Pain Intervention(s) 1: Medication (see MAR)    LAB:    Recent Labs     19  0404   HCT 41.0   HGB 13.3     Lab Results   Component Value Date/Time    Sodium 134 (L) 2019 04:04 AM    Potassium 3.5 2019 04:04 AM    Chloride 99 2019 04:04 AM    CO2 28 2019 04:04 AM    Glucose 104 (H) 2019 04:04 AM    BUN 18 2019 04:04 AM    Creatinine 0.63 (L) 2019 04:04 AM    Calcium 8.7 2019 04:04 AM       PT/OT:   Gait:  Gait  Base of Support: Widened, Center of gravity altered  Speed/Olive: Slow, Shuffled  Step Length: Left shortened, Right shortened  Gait Abnormalities: Ataxic, Altered arm swing, Decreased step clearance, Step to gait, Shuffling gait, Path deviations  Ambulation - Level of Assistance: Minimal assistance, Contact guard assistance, Assist x1, Additional time  Distance (ft): 140 Feet (ft)  Assistive Device: Gait belt                   Assessment/Plan      1. Central Cord Syndrome with spinal cord signal change at C3-4 after GLF   -MRI revealed C3-4 herniated disc with cord edema and cervical stenosis   -POD#10 C3-4 ANTERIOR CERVICAL DISCECTOMY FUSION with Dr. Alma Ordonez   -Continue PT/OT.     -Physiatry consult-Dr. Star Sands agrees patient is not safe to go home as he still requires bladder/bowel training,  PT, SLP therapy, and needs assistance with all higher level ADLs. -Pain control- PRN dilaudid, tylenol   -1 mg decadron   -Incentive Spirometer   -Tolerating diet   -VTE Prophylaxes -lovenox started 5/26    2. Neurogenic bladder   -due to #1   -Lofton replaced on 05/24 due to failed voiding trial- voiding trial recommended in rehab    3. Alcohol abuse   -counseling as able   -thiamine, folic acid    4. Dysphagia   -SLP consulted- patient with severe pharyngeal dysphagia. patient placed NPO  and Dobhoff tube placed 5/23    -Cleared MBS yesterday and SLP recommending trial of clear liquid diet. Dobhoff removed 5/28   -aspiration precautions- suction at bedside    5. Possible seizure   -? Etoh induced. Brain MRI changes seen in the left medial hippocampus- needs repeat imaging ~6/15/19  with neuro   -patient had brief episode of unresponsiveness on 5/30. Neuro reconsulted- thank you   -Keppra increased. Neuro recommends continuing on discharge   -follow-up with Dr. Richey Needs- discussed with patient and wife    6. Hypertension   -better controlled   -continue hctz   -manage pain, prn hydralazine   -Hospitalist following    7. Menieres disease   -patient on hctz to help with sodium diuresis. Can consider meclizine if he becomes symptomatic with dizziness    8. Possible CVA   -MRA head/neck revealed moderate left P2 stenosis without occlusion.   -discussed with hospitalist- ok to start ASA on POD#3 (Friday). ASA restarted 05/24    9. Constipation/ Neurogenic bowel   -due to #1   -Continue stool softener and laxative daily. schedule bisacodyl suppository every other day. Hold for loose stool or diarrhea   -prn enema    Plan above discussed with Dr. Manuel Torres, , wife at bedside    Discharge To:  Rehab for best chance at functional recovery. Physician appeal denied. Family pursuing appeal     Continue to strongly recommend discharge to inpatient rehab.  Pt was highly functional prior to this injury, taking care of himself, doing aerobics at the [facility], etc. He has multiple medical issues from this injury and will benefit from all 3 therapy disciplines at in rehab -PT/OT/Speech. Due to his SCI, he needs aggressive rehab to regain function.     Signed By: Rickie Banuelos NP

## 2019-05-31 NOTE — PROGRESS NOTES
Problem: Mobility Impaired (Adult and Pediatric)  Goal: *Acute Goals and Plan of Care (Insert Text)  Description  Physical Therapy Goals  Reassessment 5/29/2019  1. Patient will move from supine to sit and sit to supine , scoot up and down and roll side to side in bed with supervision within 7 day(s). 2.  Patient will transfer from bed to chair and chair to bed with contact guard assist using the least restrictive device within 7 day(s). 3.  Patient will perform sit to stand with contact guard assist within 7 day(s). 4.  Patient will ambulate with contact guard assist for 200 feet with the least restrictive device within 7 day(s). 5.  Patient will participate in a strengthening program and be independent within 7 days. Physical Therapy Goals  Initiated 5/18/2019  1. Patient will move from supine to sit and sit to supine , scoot up and down and roll side to side in bed with minimal assistance/contact guard assist within 7 day(s). 2.  Patient will transfer from bed to chair and chair to bed with minimal assistance/contact guard assist using the least restrictive device within 7 day(s). 3.  Patient will perform sit to stand with minimal assistance/contact guard assist within 7 day(s). 4.  Patient will ambulate with minimal assistance/contact guard assist for 150 feet with the least restrictive device within 7 day(s). 5.  Patient will participate in a strengthening program and be independent within 7 days. Outcome: Progressing Towards Goal   PHYSICAL THERAPY TREATMENT  Patient: Mulu Flaherty (15 y.o. male)  Date: 5/31/2019  Diagnosis: Alcohol intoxication ([facility] [address] [F10.929] Alcohol intoxication delirium ([facility] [address]  Procedure(s) (LRB):  C3-4 ANTERIOR CERVICAL DISCECTOMY FUSION (N/A) 10 Days Post-Op  Precautions: Fall, Spinal  Chart, physical therapy assessment, plan of care and goals were reviewed. ASSESSMENT:  Pt received supine in bed with DTR at bedside and agreeable to PT intervention.  Pt cleared by nursing for mobility. VS noted below for session. Patient with c/o constant dizziness, which is baseline for him, and increased drowsiness from increased Keppra dosage. Cervical collar donned on arrival. Needed min verbal cueing and CGA for log roll technique to R and min A for supine>sit transfer (needing assistance mostly for trunk). Sitting balance inconsistent with mild posterior lean noted, requiring VCs to correct. Sit<>stand transfers completed with CGA. Standing balance fair overall, noting mild mutli-directional swaying in static stance. He ambulated 140' with min/CGA and no AD, however with gross gait impairments, requiring constant verbal cueing to improve gait quality. Needs frequent verbal cueing for ALEJANDRINA arm swing and to improve ALEJANDRINA step length and clearance throughout ambulation. Patient was assisted to/from the bathroom with min/CGA prior to being assisted back into bed with min A for trunk control and to assist BLEs into bed. Patient was left supine in bed with all needs met, RN aware, and DTR present following therapy session. Continue to recommend patient discharge to inpatient rehab to improve functional mobility and independence prior to returning home as patient with significant decline from baseline mobility and he is a HIGH fall risk. Pt can tolerate 3 hours of therapy/day. Progression toward goals:  ?      Improving appropriately and progressing toward goals  ? Improving slowly and progressing toward goals  ? Not making progress toward goals and plan of care will be adjusted     PLAN:  Patient continues to benefit from skilled intervention to address the above impairments. Continue treatment per established plan of care. Discharge Recommendations:  Inpatient Rehab  Further Equipment Recommendations for Discharge:  TBD by rehab      SUBJECTIVE:   Patient stated ? They increased my Keppra and now I feel sleepy. ?   The patient stated 3/3 back precautions.  Reviewed all 3 with patient. OBJECTIVE DATA SUMMARY:   Critical Behavior:  Neurologic State: Alert  Orientation Level: Oriented X4  Cognition: Appropriate decision making, Appropriate safety awareness  Safety/Judgement: Insight into deficits  Functional Mobility Training:  Bed Mobility:  Log Rolling: Contact guard assistance  Supine to Sit: Minimum assistance     Scooting: Contact guard assistance        Brace donned on arrival  Transfers:  Sit to Stand: Contact guard assistance; Additional time  Stand to Sit: Contact guard assistance; Additional time                             Balance:  Sitting: Intact  Sitting - Static: Good (unsupported)  Sitting - Dynamic: Not tested  Standing: Impaired  Standing - Static: Fair  Standing - Dynamic : Fair  Ambulation/Gait Training:  Distance (ft): 140 Feet (ft)  Assistive Device: Gait belt  Ambulation - Level of Assistance: Minimal assistance;Contact guard assistance;Assist x1;Additional time        Gait Abnormalities: Ataxic; Altered arm swing;Decreased step clearance; Step to gait; Shuffling gait; Path deviations        Base of Support: Widened;Center of gravity altered     Speed/Olive: Slow;Shuffled  Step Length: Left shortened;Right shortened       Therapeutic Exercises:   Standing hip flexion x 15 BLE without AD and with CGA for safety  Pain:   No pain complaints                 Activity Tolerance:   Fair/good - VSS throughout positional changes and post-activity on RA; c/o slower processing and drowsiness due to changes in Keppra dosing  Please refer to the flowsheet for vital signs taken during this treatment. After treatment:   ?  Patient left in no apparent distress sitting up in chair  ? Patient left in no apparent distress in bed  ? Call bell left within reach  ? Nursing notified  ? Caregiver present  ?   Bed alarm activated    COMMUNICATION/COLLABORATION:   The patient?s plan of care was discussed with: Physical Therapist, Occupational Therapist and Registered Nurse    Elena Wild Harman PT, DPT   Time Calculation: 48 mins

## 2019-05-31 NOTE — PROGRESS NOTES
Problem: Self Care Deficits Care Plan (Adult)  Goal: *Acute Goals and Plan of Care (Insert Text)  Description  Occupational Therapy Goals    Goals reviewed and continued/modified as follows5/30/2019  1. Patient will perform upper body dressing with supervision/set-up within 7 day(s) using compensatory strategies PRN. 2.  Patient will perform lower body dressing with minimal assistance/contact guard assist within 7 day(s) using compensatory strategies PRN. 3.  Patient will perform grooming with supervision/setup within 7 day(s) using compensatory strategies PRN using LUE as gross motor stabilizer. 4.  Patient will perform toilet transfers with supervision/setup within 7 day(s). 5.  Patient will perform all aspects of toileting with minimal assistance/contact guard assist within 7 day(s). 6.  Patient will demonstrate ability to perform self-ROM for R and L digit/wrist extension with supervision/setup within 7 day(s). Initiated 5/22/2019  1. Patient will perform upper body dressing with supervision/set-up within 7 day(s) using compensatory strategies PRN. 2.  Patient will perform lower body dressing with minimal assistance/contact guard assist within 7 day(s) using compensatory strategies PRN. 3.  Patient will perform grooming with minimal assistance/contact guard assist within 7 day(s) using compensatory strategies PRN. 4.  Patient will perform toilet transfers with minimal assistance/contact guard assist within 7 day(s). 5.  Patient will perform all aspects of toileting with minimal assistance/contact guard assist within 7 day(s). 6.  Patient will demonstrate ability to perform self-ROM for R and L digit/wrist extension with minimal assistance within 7 day(s).        Outcome: Progressing Towards Goal   OCCUPATIONAL THERAPY TREATMENT  Patient: Royal Polo (88 y.o. male)  Date: 5/31/2019  Diagnosis: Alcohol intoxication ([facility] [address] [F10.929] Alcohol intoxication delirium ([facility] [address]  Procedure(s) (LRB):  C3-4 ANTERIOR CERVICAL DISCECTOMY FUSION (N/A) 10 Days Post-Op  Precautions: Fall, Spinal  Chart, occupational therapy assessment, plan of care, and goals were reviewed. ASSESSMENT:  Pt seen in OT for feeding and hand exercises (to enhance finger isolation, in-hand manipulation and  strength). Transferred from bed to chair at min assist level. Seated in chair, positioned both UEs with pillows to provide increase support for shoulders. Pt given spoon with tubi- and was able to achieve hand to mouth. Pt was very surprised he was able to do on own. Pt given the following exercises to perform over the weekend: sliding coins to end of table and placing coins in cup to work on pincer grasp/reaching, turning cards and placing quarter in palm and work on getting coin to tip of fingers (in-hand manipulation). Pt also given green sponge to work on  strength. Verbal/tactile cues needed for positioning 2/2 pt using his wrist not his fingers to achieve full . Wife was present for the session. Patient/family would like to work on dressing. Educated family the hand exercises will assist with his dressing. Discussed types of clothes that would work well for him in order to achieve increase independence. Will con't to follow. Progression toward goals:  ?       Improving appropriately and progressing toward goals  ? Improving slowly and progressing toward goals  ? Not making progress toward goals and plan of care will be adjusted     PLAN:  Patient continues to benefit from skilled intervention to address the above impairments. Continue treatment per established plan of care. Discharge Recommendations:  Inpatient Rehab  Further Equipment Recommendations for Discharge:  TBD      SUBJECTIVE:   Patient stated ? This is the first time I have fed myself. ?    OBJECTIVE DATA SUMMARY:   Cognitive/Behavioral Status:  Neurologic State: Alert  Orientation Level: Oriented X4 Functional Mobility and Transfers for ADLs:  Bed Mobility:  Rolling: Contact guard assistance  Supine to Sit: Minimum assistance  Sit to Supine: Minimum assistance  Scooting: Contact guard assistance    Transfers:  Sit to Stand: Contact guard assistance; Additional time          Balance:  Sitting: Intact  Sitting - Static: Good (unsupported)  Sitting - Dynamic: Not tested  Standing: Impaired  Standing - Static: Fair  Standing - Dynamic : Fair    ADL Intervention:  Feeding  Feeding Assistance: Stand-by assistance  Utensil Management: Stand-by assistance  Food to Mouth: Stand-by assistance  Adaptive Equipment: Built up spoon                                       Neuro Re-Education:   See under assessment        Therapeutic Exercises:     Pain:                    Activity Tolerance:   Good  Please refer to the flowsheet for vital signs taken during this treatment. After treatment:   ? Patient left in no apparent distress sitting up in chair  ? Patient left in no apparent distress in bed  ? Call bell left within reach  ? Nursing notified  ? Caregiver present  ?  Bed alarm activated    COMMUNICATION/COLLABORATION:   The patient?s plan of care was discussed with: Physical Therapist    Elizabeth Ledesma OTR/L  Time Calculation: 27 mins

## 2019-05-31 NOTE — PROGRESS NOTES
Neurology Progress Note    Patient ID:  Hilton Alba  [id number]  78 y.o.  1950    Subjective:   Neurology reconsulted due to a brief episode of non-responsiveness 5/30/19 without noted seizure activity. He is presently alert/awake and following commands appropriately. LEV titrated to 750mg BID for seizure ppx.       Current Facility-Administered Medications   Medication Dose Route Frequency    hydroCHLOROthiazide (HYDRODIURIL) tablet 25 mg  25 mg Oral DAILY    [START ON 6/1/2019] bisacodyl (DULCOLAX) suppository 10 mg  10 mg Rectal EVERY OTHER DAY    pantoprazole (PROTONIX) tablet 40 mg  40 mg Oral ACB    levETIRAcetam (KEPPRA) oral solution 750 mg  750 mg Oral Q12H    docusate (COLACE) 50 mg/5 mL oral liquid 100 mg  100 mg Oral BID    dexamethasone (DECADRON) tablet 1 mg  1 mg Oral DAILY    enoxaparin (LOVENOX) injection 40 mg  40 mg SubCUTAneous Q24H    lactulose (CHRONULAC) 10 gram/15 mL solution 45 mL  30 g Oral DAILY PRN    mineral oil (FLEET) enema   Rectal PRN    aspirin chewable tablet 81 mg  81 mg Oral DAILY    tamsulosin (FLOMAX) capsule 0.4 mg  0.4 mg Oral DAILY    lidocaine (XYLOCAINE) 2 % viscous solution 15 mL  15 mL Other PRN    polyethylene glycol (MIRALAX) packet 17 g  17 g Oral DAILY    senna (SENOKOT) tablet 17.2 mg  2 Tab Oral DAILY    lidocaine (LIDODERM) 5 % patch 2 Patch  2 Patch TransDERmal Q24H    acetaminophen (TYLENOL) solution 650 mg  650 mg Per NG tube Q6H    meclizine (ANTIVERT) tablet 12.5 mg  12.5 mg Oral Q6H PRN    HYDROmorphone (PF) (DILAUDID) injection 0.5 mg  0.5 mg IntraVENous Q3H PRN    sodium chloride (NS) flush 5-40 mL  5-40 mL IntraVENous Q8H    sodium chloride (NS) flush 5-40 mL  5-40 mL IntraVENous PRN    naloxone (NARCAN) injection 0.4 mg  0.4 mg IntraVENous PRN    oxyCODONE IR (ROXICODONE) tablet 5 mg  5 mg Oral Q3H PRN    HYDROmorphone (DILAUDID) tablet 4 mg  4 mg Oral Q3H PRN    acetaminophen (TYLENOL) suppository 650 mg  650 mg Rectal O7F PRN    folic acid (FOLVITE) tablet 1 mg  1 mg Oral DAILY    thiamine HCL (B-1) tablet 100 mg  100 mg Oral DAILY    pantothenic ac-min oil-pet,hyd (AQUAPHOR) 41 % ointment   Topical PRN    hydrALAZINE (APRESOLINE) 20 mg/mL injection 10 mg  10 mg IntraVENous Q4H PRN    metoprolol (LOPRESSOR) injection 2.5 mg  2.5 mg IntraVENous Q6H PRN    sodium chloride (NS) flush 5-40 mL  5-40 mL IntraVENous Q8H    sodium chloride (NS) flush 5-40 mL  5-40 mL IntraVENous PRN    ondansetron (ZOFRAN) injection 4 mg  4 mg IntraVENous Q4H PRN    naloxone (NARCAN) injection 0.4 mg  0.4 mg IntraVENous PRN    bisacodyl (DULCOLAX) tablet 5 mg  5 mg Oral DAILY PRN    acetaminophen (TYLENOL) tablet 650 mg  650 mg Oral Q4H PRN    prochlorperazine (COMPAZINE) tablet 10 mg  10 mg Oral Q6H PRN    phenol throat spray (CHLORASEPTIC) 1 Spray  1 Spray Oral PRN          Objective:     Patient Vitals for the past 8 hrs:   BP Temp Pulse Resp SpO2 Height Weight   05/31/19 1006 111/69 -- 87 -- -- -- --   05/31/19 1001 105/67 -- 87 -- 97 % -- --   05/31/19 0958 132/71 -- 73 -- 96 % -- --   05/31/19 0955 126/74 -- 74 -- 96 % -- --   05/31/19 0940 102/66 97.7 °F (36.5 °C) 77 16 97 % -- --   05/31/19 0633 -- -- -- -- -- 5' 8\" (1.727 m) 73.5 kg (162 lb 0.6 oz)       No intake/output data recorded.   05/29 1901 - 05/31 0700  In: -   Out: 0 [Urine:1050]    Lab Review   Recent Results (from the past 24 hour(s))   METABOLIC PANEL, BASIC    Collection Time: 05/30/19  2:32 PM   Result Value Ref Range    Sodium 136 136 - 145 mmol/L    Potassium 3.4 (L) 3.5 - 5.1 mmol/L    Chloride 101 97 - 108 mmol/L    CO2 29 21 - 32 mmol/L    Anion gap 6 5 - 15 mmol/L    Glucose 109 (H) 65 - 100 mg/dL    BUN 21 (H) 6 - 20 MG/DL    Creatinine 0.69 (L) 0.70 - 1.30 MG/DL    BUN/Creatinine ratio 30 (H) 12 - 20      GFR est AA >60 >60 ml/min/1.73m2    GFR est non-AA >60 >60 ml/min/1.73m2    Calcium 8.5 8.5 - 10.1 MG/DL   MAGNESIUM    Collection Time: 05/30/19 2:32 PM   Result Value Ref Range    Magnesium 2.4 1.6 - 2.4 mg/dL   CBC WITH AUTOMATED DIFF    Collection Time: 05/30/19  2:32 PM   Result Value Ref Range    WBC 9.5 4.1 - 11.1 K/uL    RBC 4.86 4.10 - 5.70 M/uL    HGB 14.1 12.1 - 17.0 g/dL    HCT 43.8 36.6 - 50.3 %    MCV 90.1 80.0 - 99.0 FL    MCH 29.0 26.0 - 34.0 PG    MCHC 32.2 30.0 - 36.5 g/dL    RDW 14.0 11.5 - 14.5 %    PLATELET 670 611 - 860 K/uL    MPV 9.5 8.9 - 12.9 FL    NRBC 0.0 0  WBC    ABSOLUTE NRBC 0.00 0.00 - 0.01 K/uL    NEUTROPHILS 80 (H) 32 - 75 %    LYMPHOCYTES 12 12 - 49 %    MONOCYTES 7 5 - 13 %    EOSINOPHILS 0 0 - 7 %    BASOPHILS 0 0 - 1 %    IMMATURE GRANULOCYTES 1 (H) 0.0 - 0.5 %    ABS. NEUTROPHILS 7.5 1.8 - 8.0 K/UL    ABS. LYMPHOCYTES 1.1 0.8 - 3.5 K/UL    ABS. MONOCYTES 0.7 0.0 - 1.0 K/UL    ABS. EOSINOPHILS 0.0 0.0 - 0.4 K/UL    ABS. BASOPHILS 0.0 0.0 - 0.1 K/UL    ABS. IMM.  GRANS. 0.1 (H) 0.00 - 0.04 K/UL    DF AUTOMATED     METABOLIC PANEL, BASIC    Collection Time: 05/31/19  4:04 AM   Result Value Ref Range    Sodium 134 (L) 136 - 145 mmol/L    Potassium 3.5 3.5 - 5.1 mmol/L    Chloride 99 97 - 108 mmol/L    CO2 28 21 - 32 mmol/L    Anion gap 7 5 - 15 mmol/L    Glucose 104 (H) 65 - 100 mg/dL    BUN 18 6 - 20 MG/DL    Creatinine 0.63 (L) 0.70 - 1.30 MG/DL    BUN/Creatinine ratio 29 (H) 12 - 20      GFR est AA >60 >60 ml/min/1.73m2    GFR est non-AA >60 >60 ml/min/1.73m2    Calcium 8.7 8.5 - 10.1 MG/DL   CBC WITH AUTOMATED DIFF    Collection Time: 05/31/19  4:04 AM   Result Value Ref Range    WBC 13.7 (H) 4.1 - 11.1 K/uL    RBC 4.53 4.10 - 5.70 M/uL    HGB 13.3 12.1 - 17.0 g/dL    HCT 41.0 36.6 - 50.3 %    MCV 90.5 80.0 - 99.0 FL    MCH 29.4 26.0 - 34.0 PG    MCHC 32.4 30.0 - 36.5 g/dL    RDW 13.9 11.5 - 14.5 %    PLATELET 065 448 - 787 K/uL    MPV 9.9 8.9 - 12.9 FL    NRBC 0.0 0  WBC    ABSOLUTE NRBC 0.00 0.00 - 0.01 K/uL    NEUTROPHILS 83 (H) 32 - 75 %    LYMPHOCYTES 10 (L) 12 - 49 %    MONOCYTES 6 5 - 13 % EOSINOPHILS 0 0 - 7 %    BASOPHILS 0 0 - 1 %    IMMATURE GRANULOCYTES 1 (H) 0.0 - 0.5 %    ABS. NEUTROPHILS 11.3 (H) 1.8 - 8.0 K/UL    ABS. LYMPHOCYTES 1.4 0.8 - 3.5 K/UL    ABS. MONOCYTES 0.8 0.0 - 1.0 K/UL    ABS. EOSINOPHILS 0.0 0.0 - 0.4 K/UL    ABS. BASOPHILS 0.0 0.0 - 0.1 K/UL    ABS. IMM. GRANS. 0.1 (H) 0.00 - 0.04 K/UL    DF AUTOMATED       NEUROLOGICAL EXAM:    Appearance: The patient is well developed, well nourished, provides a coherent history and is in no acute distress. Mental Status: Oriented to time, place and person. Mood and affect appropriate. Cranial Nerves:   Intact visual fields. DEANA, EOM's full, no nystagmus, no ptosis. Facial sensation is normal.  Facial movement is symmetric. Hearing is normal bilaterally. Palate is midline with normal sternocleidomastoid and trapezius muscles are normal. Tongue is midline. Motor:  LUE 4/5, RUE 5-/5, BLE 5/5   Reflexes:   Deferred   Sensory:   Normal to touch   Gait:  Deferred   Tremor:   No tremor noted. Cerebellar:  R>LUE ataxia       Assessment:     Principal Problem:    Alcohol intoxication delirium ([facility] [address] (5/15/2019)    Active Problems:    Alcohol intoxication ([facility] [address] (5/15/2019)      Spinal cord injury at C1-C4 level [facility]) (5/22/2019)    78year old male initially found down unresponsive and intoxicated with presumed seizure activity 5/14/19, subsequent central cord syndrome noted on imaging s/p discectomy and fusion this admission, residual b/l L>RUE weakness, neurogenic bladder. MRI abnormalities including FLAIR signal median hippocampus likely related to seizures. He has been maintained on LEV. Neurology reconsulted due to reported episode of brief non-responsiveness yesterday morning. LEV titrated to 750mg BID. No further witnessed events since this time. He is back to his baseline without new neurologic deficits.     Plan:   Continue LEV 750mg BID for seizure ppx  He will require interval repeat MRI [facility] FOR COGNITIVE DISORDERS ~6/15/19 and preferably prior to outpatient F/U with Dr. Dion Donovan to ensure resolution of RD/FLAIR abnormalities seen on initial imaging. Please reconsult if any additional questions/concerns.         Signed:  Sandy Zepeda DO  5/31/2019  1:06 PM

## 2019-05-31 NOTE — PROGRESS NOTES
CM met with pt's wife late yesterday. She stated that she has personally appealed [facility] regarding IP rehab. CM will follow.  Tucker Ledesma

## 2019-05-31 NOTE — PROGRESS NOTES
VS for PT session as follows:    Vitals:    05/31/19 0955 05/31/19 0958 05/31/19 1001 05/31/19 1006   BP: 126/74 132/71 105/67 111/69   BP 1 Location:       BP Patient Position: Supine Sitting Standing Standing;Post activity   Pulse: 74 73 87 87   Resp:       Temp:       SpO2: 96% 96% 97%    Weight:       Height:

## 2019-05-31 NOTE — PROGRESS NOTES
Hospitalist Progress Note                               Scott Matamoros MD                                     Answering service: [phone]                               OR 1704 from in house phone                                         Date of Service:  2019  NAME:  Kiko Armenta  :  1950  MRN:  [id number]      Admission Summary:   77 Y/O gentleman with a PMH significant for Meniere's disease, hypertension and GERD  was found down in his backyard unresponsive with foam at the mouth. EMS was called and patient was brought to [facility] ED and he was found to have an alcohol level of 162. Neurology was consulted for possible seizure and he was started on Keppra. After recovery from his intoxication and lethargy his family noticed he had weakness in his arms. It was also noted that he had urinary retention and difficulty swallowing. MRI of the cervical spine was completed which showed disc herniation at C3-C4 level with some edema in the central cord. Reason for follow up:       CC:Unresponsiveness    All events in the past 24 hours including Rapid Response called for brief episode of unresponsiveness 19 am, reviewed in their entirety. Patient appeared much more awake and alert on early am rounds. Denied any new complaints. Assessment & Plan:     1) CNS: Central cord Syndrome with spinal cord Canal change at C3-4 after a fall at home. MRI positive for C3-4 herniated disc with cord edema and cervical stenosis. Day # 10 S/P C3-4 Anterior Cervical Discectomy Fusion. 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 Menieres Disease. Continue low dose Keppra, Aspirin, as needed Meclizine and neurochecks. Neurology and Neurosurgery F/Us noted and appreciated. 2) : S/P Lofton Catheter placement for  Neurogenic bladder due to the probable Central cord syndrome.  Outpatient Urology F/U with voiding trial.    3) CVS: Primary Hypertension. 4) GI: Constipation probably due to Neurogenic bowel. Continue bowel regimen. H/O GERD. Resolving dysphagia. Tolerating diet    5) Social: Alcohol Use Disorder. Daily MVI/minerals, Thiamine and Folic Acid. Cessation counselling done. 6) Prophylaxis: DVT. 7) Rehab: Daily PT/OT and speech therapy as tolerated. 8) Directives: Full Code with a very guarded prognosis. D/W patient. 9) Plan: Patient will benefit from Inpatient Rehab for ongoing therapies. However, [facility] declined. Patient and wife reviewing other options whilst Appeal pending. At increased risk of decompensation and readmission. Discussed in detail with patient and patient's wife Eron Garzon who can be reached at [phone]. Hospital Problems  Date Reviewed: 5/20/2019          Codes Class Noted POA    Spinal cord injury at C1-C4 level [facility]) ICD-10-CM: S14.101A  ICD-9-CM: 952.00  5/22/2019 Unknown        * (Principal) Alcohol intoxication delirium ([facility] [address] ICD-10-CM: F10.121  ICD-9-CM: 291.0  5/15/2019 Yes        Alcohol intoxication ([facility] [address] ICD-10-CM: U27.264  ICD-9-CM: 305.00  5/15/2019 Unknown                Physical Examination:      Last 24hrs VS reviewed since prior progress note. Most recent are:  Visit Vitals  /69 (BP Patient Position: Standing;Post activity)   Pulse 87   Temp 97.7 °F (36.5 °C)   Resp 16   Ht 5' 8\" (1.727 m)   Wt 73.5 kg (162 lb 0.6 oz)   SpO2 97%   BMI 24.64 kg/m²           Constitutional:  No acute distress, cooperative, pleasant    HEENT: Head is a traumatic,  Un icteric sclera. Pink conjunctiva,no erythema or discharge. Oral mucous moist, oropharynx benign. Neck supple,   NECK: Cervical Collar in Situ. Resp:  Decreased air entry Bibasilarly   CV:  Regular rhythm, normal rate, no murmurs, gallops, rubs    GI:  Soft, non distended, non tender.  normoactive bowel sounds, no hepatosplenomegaly    :  No CVA or suprapubic tenderness   Skin  :  No erythema,rash,bullae,dipigmentation     Musculoskeletal:  Bipedal edema. Neurologic:  Awake, alert and oriented. Intake/Output Summary (Last 24 hours) at 5/31/2019 1140  Last data filed at 5/31/2019 4536  Gross per 24 hour   Intake --   Output 700 ml   Net -700 ml          Labs:     Recent Labs     05/31/19  0404 05/30/19  1432   WBC 13.7* 9.5   HGB 13.3 14.1   HCT 41.0 43.8    203     Recent Labs     05/31/19  0404 05/30/19  1432 05/29/19  0452   * 136 137   K 3.5 3.4* 3.9   CL 99 101 103   CO2 28 29 30   BUN 18 21* 18   CREA 0.63* 0.69* 0.65*   * 109* 97   CA 8.7 8.5 8.9   MG  --  2.4 2.2   PHOS  --   --  4.3     No results for input(s): SGOT, GPT, ALT, AP, TBIL, TBILI, TP, ALB, GLOB, GGT, AML, LPSE in the last 72 hours. No lab exists for component: AMYP, HLPSE  No results for input(s): INR, PTP, APTT in the last 72 hours. No lab exists for component: INREXT, INREXT   No results for input(s): FE, TIBC, PSAT, FERR in the last 72 hours. No results found for: FOL, RBCF   No results for input(s): PH, PCO2, PO2 in the last 72 hours. No results for input(s): CPK, CKNDX, TROIQ in the last 72 hours.     No lab exists for component: CPKMB  Lab Results   Component Value Date/Time    Cholesterol, total 129 05/15/2019 01:54 AM    HDL Cholesterol 54 05/15/2019 01:54 AM    LDL, calculated 65 05/15/2019 01:54 AM    Triglyceride 50 05/15/2019 01:54 AM    CHOL/HDL Ratio 2.4 05/15/2019 01:54 AM     Lab Results   Component Value Date/Time    Glucose (POC) 123 (H) 05/30/2019 12:32 PM    Glucose (POC) 119 (H) 05/28/2019 06:56 AM    Glucose (POC) 148 (H) 05/18/2019 04:47 PM    Glucose (POC) 170 (H) 05/18/2019 12:37 PM    Glucose (POC) 114 (H) 05/17/2019 04:42 PM     Lab Results   Component Value Date/Time    Color YELLOW/STRAW 05/14/2019 08:14 PM    Appearance CLEAR 05/14/2019 08:14 PM    Specific gravity 1.016 05/14/2019 08:14 PM    pH (UA) 5.5 05/14/2019 08:14 PM    Protein NEGATIVE 05/14/2019 08:14 PM    Glucose NEGATIVE  05/14/2019 08:14 PM    Ketone 15 (A) 05/14/2019 08:14 PM    Bilirubin NEGATIVE  05/14/2019 08:14 PM    Urobilinogen 0.2 05/14/2019 08:14 PM    Nitrites NEGATIVE  05/14/2019 08:14 PM    Leukocyte Esterase NEGATIVE  05/14/2019 08:14 PM         Medications Reviewed:     Current Facility-Administered Medications   Medication Dose Route Frequency    hydroCHLOROthiazide (HYDRODIURIL) tablet 25 mg  25 mg Oral DAILY    [START ON 6/1/2019] bisacodyl (DULCOLAX) suppository 10 mg  10 mg Rectal EVERY OTHER DAY    pantoprazole (PROTONIX) tablet 40 mg  40 mg Oral ACB    levETIRAcetam (KEPPRA) oral solution 750 mg  750 mg Oral Q12H    docusate (COLACE) 50 mg/5 mL oral liquid 100 mg  100 mg Oral BID    dexamethasone (DECADRON) tablet 1 mg  1 mg Oral DAILY    enoxaparin (LOVENOX) injection 40 mg  40 mg SubCUTAneous Q24H    lactulose (CHRONULAC) 10 gram/15 mL solution 45 mL  30 g Oral DAILY PRN    mineral oil (FLEET) enema   Rectal PRN    aspirin chewable tablet 81 mg  81 mg Oral DAILY    tamsulosin (FLOMAX) capsule 0.4 mg  0.4 mg Oral DAILY    lidocaine (XYLOCAINE) 2 % viscous solution 15 mL  15 mL Other PRN    polyethylene glycol (MIRALAX) packet 17 g  17 g Oral DAILY    senna (SENOKOT) tablet 17.2 mg  2 Tab Oral DAILY    lidocaine (LIDODERM) 5 % patch 2 Patch  2 Patch TransDERmal Q24H    acetaminophen (TYLENOL) solution 650 mg  650 mg Per NG tube Q6H    meclizine (ANTIVERT) tablet 12.5 mg  12.5 mg Oral Q6H PRN    HYDROmorphone (PF) (DILAUDID) injection 0.5 mg  0.5 mg IntraVENous Q3H PRN    sodium chloride (NS) flush 5-40 mL  5-40 mL IntraVENous Q8H    sodium chloride (NS) flush 5-40 mL  5-40 mL IntraVENous PRN    naloxone (NARCAN) injection 0.4 mg  0.4 mg IntraVENous PRN    oxyCODONE IR (ROXICODONE) tablet 5 mg  5 mg Oral Q3H PRN    HYDROmorphone (DILAUDID) tablet 4 mg  4 mg Oral Q3H PRN    acetaminophen (TYLENOL) suppository 650 mg  650 mg Rectal K8D PRN    folic acid (FOLVITE) tablet 1 mg  1 mg Oral DAILY    thiamine HCL (B-1) tablet 100 mg  100 mg Oral DAILY    pantothenic ac-min oil-pet,hyd (AQUAPHOR) 41 % ointment   Topical PRN    hydrALAZINE (APRESOLINE) 20 mg/mL injection 10 mg  10 mg IntraVENous Q4H PRN    metoprolol (LOPRESSOR) injection 2.5 mg  2.5 mg IntraVENous Q6H PRN    sodium chloride (NS) flush 5-40 mL  5-40 mL IntraVENous Q8H    sodium chloride (NS) flush 5-40 mL  5-40 mL IntraVENous PRN    ondansetron (ZOFRAN) injection 4 mg  4 mg IntraVENous Q4H PRN    naloxone (NARCAN) injection 0.4 mg  0.4 mg IntraVENous PRN    bisacodyl (DULCOLAX) tablet 5 mg  5 mg Oral DAILY PRN    acetaminophen (TYLENOL) tablet 650 mg  650 mg Oral Q4H PRN    prochlorperazine (COMPAZINE) tablet 10 mg  10 mg Oral Q6H PRN    phenol throat spray (CHLORASEPTIC) 1 Spray  1 Spray Oral PRN     ______________________________________________________________________  EXPECTED LENGTH OF STAY: 4d 21h  ACTUAL LENGTH OF STAY:          16                 Bree Coates MD

## 2019-06-01 ENCOUNTER — HOSPITAL ENCOUNTER (OUTPATIENT)
Dept: REHABILITATION | Age: 69
End: 2019-06-21
Attending: PHYSICAL MEDICINE & REHABILITATION | Admitting: PHYSICAL MEDICINE & REHABILITATION

## 2019-06-01 VITALS
WEIGHT: 141.7 LBS | DIASTOLIC BLOOD PRESSURE: 79 MMHG | OXYGEN SATURATION: 99 % | HEART RATE: 61 BPM | TEMPERATURE: 98.5 F | RESPIRATION RATE: 12 BRPM | HEIGHT: 68 IN | BODY MASS INDEX: 21.47 KG/M2 | SYSTOLIC BLOOD PRESSURE: 157 MMHG

## 2019-06-01 DIAGNOSIS — R13.13 DYSPHAGIA, PHARYNGEAL PHASE: ICD-10-CM

## 2019-06-01 LAB
ANION GAP SERPL CALC-SCNC: 7 MMOL/L (ref 5–15)
APPEARANCE UR: ABNORMAL
BACTERIA URNS QL MICRO: NEGATIVE /HPF
BILIRUB UR QL: NEGATIVE
BUN SERPL-MCNC: 16 MG/DL (ref 6–20)
BUN/CREAT SERPL: 25 (ref 12–20)
CALCIUM SERPL-MCNC: 8.7 MG/DL (ref 8.5–10.1)
CHLORIDE SERPL-SCNC: 102 MMOL/L (ref 97–108)
CO2 SERPL-SCNC: 28 MMOL/L (ref 21–32)
COLOR UR: ABNORMAL
CREAT SERPL-MCNC: 0.63 MG/DL (ref 0.7–1.3)
EPITH CASTS URNS QL MICRO: ABNORMAL /LPF
ERYTHROCYTE [DISTWIDTH] IN BLOOD BY AUTOMATED COUNT: 13.9 % (ref 11.5–14.5)
GLUCOSE SERPL-MCNC: 87 MG/DL (ref 65–100)
GLUCOSE UR STRIP.AUTO-MCNC: NEGATIVE MG/DL
HCT VFR BLD AUTO: 41.7 % (ref 36.6–50.3)
HGB BLD-MCNC: 13.6 G/DL (ref 12.1–17)
HGB UR QL STRIP: ABNORMAL
HYALINE CASTS URNS QL MICRO: ABNORMAL /LPF (ref 0–5)
KETONES UR QL STRIP.AUTO: ABNORMAL MG/DL
LEUKOCYTE ESTERASE UR QL STRIP.AUTO: NEGATIVE
MCH RBC QN AUTO: 29.2 PG (ref 26–34)
MCHC RBC AUTO-ENTMCNC: 32.6 G/DL (ref 30–36.5)
MCV RBC AUTO: 89.7 FL (ref 80–99)
NITRITE UR QL STRIP.AUTO: NEGATIVE
NRBC # BLD: 0 K/UL (ref 0–0.01)
NRBC BLD-RTO: 0 PER 100 WBC
PH UR STRIP: 5.5 [PH] (ref 5–8)
PLATELET # BLD AUTO: 189 K/UL (ref 150–400)
PMV BLD AUTO: 9.5 FL (ref 8.9–12.9)
POTASSIUM SERPL-SCNC: 3.7 MMOL/L (ref 3.5–5.1)
PROT UR STRIP-MCNC: NEGATIVE MG/DL
RBC # BLD AUTO: 4.65 M/UL (ref 4.1–5.7)
RBC #/AREA URNS HPF: >100 /HPF (ref 0–5)
SODIUM SERPL-SCNC: 137 MMOL/L (ref 136–145)
SP GR UR REFRACTOMETRY: 1.02 (ref 1–1.03)
UROBILINOGEN UR QL STRIP.AUTO: 1 EU/DL (ref 0.2–1)
WBC # BLD AUTO: 7 K/UL (ref 4.1–11.1)
WBC URNS QL MICRO: ABNORMAL /HPF (ref 0–4)

## 2019-06-01 PROCEDURE — 85027 COMPLETE CBC AUTOMATED: CPT

## 2019-06-01 PROCEDURE — 81001 URINALYSIS AUTO W/SCOPE: CPT

## 2019-06-01 PROCEDURE — 87086 URINE CULTURE/COLONY COUNT: CPT

## 2019-06-01 PROCEDURE — 74011000250 HC RX REV CODE- 250: Performed by: INTERNAL MEDICINE

## 2019-06-01 PROCEDURE — 97116 GAIT TRAINING THERAPY: CPT

## 2019-06-01 PROCEDURE — 36415 COLL VENOUS BLD VENIPUNCTURE: CPT

## 2019-06-01 PROCEDURE — 74011250636 HC RX REV CODE- 250/636: Performed by: NURSE PRACTITIONER

## 2019-06-01 PROCEDURE — 74011250636 HC RX REV CODE- 250/636: Performed by: NEUROLOGICAL SURGERY

## 2019-06-01 PROCEDURE — 80048 BASIC METABOLIC PNL TOTAL CA: CPT

## 2019-06-01 PROCEDURE — 74011250637 HC RX REV CODE- 250/637: Performed by: NURSE PRACTITIONER

## 2019-06-01 PROCEDURE — 74011250637 HC RX REV CODE- 250/637: Performed by: INTERNAL MEDICINE

## 2019-06-01 PROCEDURE — 74011250637 HC RX REV CODE- 250/637: Performed by: NEUROLOGICAL SURGERY

## 2019-06-01 PROCEDURE — 94760 N-INVAS EAR/PLS OXIMETRY 1: CPT

## 2019-06-01 RX ORDER — BISACODYL 5 MG
5 TABLET, DELAYED RELEASE (ENTERIC COATED) ORAL
Qty: 30 TAB | Refills: 0 | Status: SHIPPED | OUTPATIENT
Start: 2019-06-01

## 2019-06-01 RX ORDER — ONDANSETRON 4 MG/1
4 TABLET, FILM COATED ORAL
Qty: 20 TAB | Refills: 0 | Status: ON HOLD | OUTPATIENT
Start: 2019-06-01 | End: 2022-08-31

## 2019-06-01 RX ORDER — TAMSULOSIN HYDROCHLORIDE 0.4 MG/1
0.4 CAPSULE ORAL DAILY
Qty: 30 CAP | Refills: 1 | Status: SHIPPED | OUTPATIENT
Start: 2019-06-02

## 2019-06-01 RX ORDER — AMOXICILLIN 250 MG
1 CAPSULE ORAL
Qty: 30 TAB | Refills: 1 | Status: ON HOLD | OUTPATIENT
Start: 2019-06-01 | End: 2022-08-31

## 2019-06-01 RX ORDER — DEXAMETHASONE 1 MG/1
TABLET ORAL
Qty: 10 TAB | Refills: 0 | Status: ON HOLD | OUTPATIENT
Start: 2019-06-02 | End: 2022-08-31

## 2019-06-01 RX ORDER — GUAIFENESIN 100 MG/5ML
81 LIQUID (ML) ORAL DAILY
Qty: 30 TAB | Refills: 1 | Status: ON HOLD | OUTPATIENT
Start: 2019-06-02 | End: 2022-08-31

## 2019-06-01 RX ORDER — ACETAMINOPHEN 325 MG/1
650 TABLET ORAL
Qty: 20 TAB | Refills: 0 | Status: SHIPPED | OUTPATIENT
Start: 2019-06-01

## 2019-06-01 RX ORDER — OXYCODONE HYDROCHLORIDE 5 MG/1
5 TABLET ORAL
Qty: 15 TAB | Refills: 0 | Status: SHIPPED | OUTPATIENT
Start: 2019-06-01 | End: 2019-06-04

## 2019-06-01 RX ORDER — LIDOCAINE 50 MG/G
PATCH TOPICAL
Qty: 1 EACH | Refills: 0 | Status: ON HOLD | OUTPATIENT
Start: 2019-06-02 | End: 2022-08-31

## 2019-06-01 RX ORDER — MECLIZINE HCL 12.5 MG 12.5 MG/1
12.5 TABLET ORAL
Qty: 20 TAB | Refills: 0 | Status: SHIPPED | OUTPATIENT
Start: 2019-06-01 | End: 2019-06-11

## 2019-06-01 RX ORDER — POLYETHYLENE GLYCOL 3350 17 G/17G
17 POWDER, FOR SOLUTION ORAL
Qty: 30 PACKET | Refills: 0 | Status: ON HOLD | OUTPATIENT
Start: 2019-06-01 | End: 2022-08-31

## 2019-06-01 RX ADMIN — Medication 10 ML: at 06:54

## 2019-06-01 RX ADMIN — SENNOSIDES 17.2 MG: 8.6 TABLET, FILM COATED ORAL at 08:58

## 2019-06-01 RX ADMIN — BISACODYL 10 MG: 10 SUPPOSITORY RECTAL at 06:54

## 2019-06-01 RX ADMIN — ASPIRIN 81 MG 81 MG: 81 TABLET ORAL at 08:58

## 2019-06-01 RX ADMIN — PANTOPRAZOLE SODIUM 40 MG: 40 TABLET, DELAYED RELEASE ORAL at 06:54

## 2019-06-01 RX ADMIN — TAMSULOSIN HYDROCHLORIDE 0.4 MG: 0.4 CAPSULE ORAL at 08:58

## 2019-06-01 RX ADMIN — MINERAL OIL 133 ML: 100 ENEMA RECTAL at 13:24

## 2019-06-01 RX ADMIN — ENOXAPARIN SODIUM 40 MG: 40 INJECTION SUBCUTANEOUS at 13:06

## 2019-06-01 RX ADMIN — HYDROCHLOROTHIAZIDE 25 MG: 25 TABLET ORAL at 08:58

## 2019-06-01 RX ADMIN — DEXAMETHASONE 1 MG: 1 TABLET ORAL at 09:14

## 2019-06-01 RX ADMIN — OXYCODONE HYDROCHLORIDE 5 MG: 5 TABLET ORAL at 15:59

## 2019-06-01 RX ADMIN — LEVETIRACETAM 750 MG: 100 SOLUTION ORAL at 06:54

## 2019-06-01 RX ADMIN — Medication 10 ML: at 13:07

## 2019-06-01 NOTE — DISCHARGE SUMMARY
[facility] SUMMARY    Name:  Abby Mccall  MR#:  [id number]  :  1950  ACCOUNT #:  [de-identified]  ADMIT DATE:  2019  DISCHARGE DATE:  2019      MAIN DIAGNOSES ON ADMISSION:  1. Acute encephalopathy. 2.  Probable seizure. 3.  Alcohol intoxication. CONSULTANTS:  1.  Neurology. 2.  Neurosurgery. 3.  Urology. HOSPITAL COURSE:  A 67-year-old gentleman with a past medical history significant for Meniere's disease, primary hypertension, and gastroesophageal reflux disease, was in his usual state of health until he was found down in his backyard at home unresponsive foaming at the mouth. EMS was called and the patient was brought to the [facility] Emergency Room and was found to have an alcohol level of 162 on initial evaluation. The patient was admitted to the neuro tele floor. On further evaluation with CT of the head without contrast which did not show any acute findings. A CT scan of the cervical spine with no acute changes. Chest x-ray with no acute pneumonia, effusions, or mediastinal adenopathy. A 12-lead EKG that showed normal sinus rhythm without any ST or T-wave abnormalities. CBC with a WBC of 14.4, hemoglobin 15.1, hematocrit 47.3, platelet count of 251, serum alcohol level of 162, and acetaminophen level of less than 2, a salicylate level less than 1.7, a magnesium level 2.3, lipase of 88. Metabolic panel with a sodium of 142, potassium 3.9, chloride 108, bicarb of 26, glucose 96, a BUN of 13, creatinine 0.89, calcium 8.6, total bilirubin 0.5, ALT 24, AST 20, and alkaline phosphatase of 82, albumin level 3.8, globulin of 3.3, lactic acid level 3.5. A urine drug screen which was negative. The patient was put on IV fluids, n.p.o. status, close neurologic checks, started on a CIWA protocol with Ativan for any alcohol withdrawal.  The patient's blood pressure control was also monitored.   The patient also noted with an initial leukocytosis, however, had no other stigmata of sepsis. The patient was seen by the Neurology Service for possible acute seizures and was started on Keppra 500 mg twice daily. The patient had slow and steady resolution of the acute encephalopathy, however, hospital course was complicated by persistent lethargy with increased weakness in the upper extremities. The patient also noted to have some urinary retention and difficulty in swallowing. On further neurological reevaluation including with an MRI of the cervical spine showed some disk herniation at C3-C4 level with some edema in the central cord. For this, the patient was evaluated emergently by the Neurosurgical Service and diagnosed by the Neurosurgical Service with a central cord syndrome with spinal cord canal change at C3-C4 due to the fall at home. The patient underwent C3-4 anterior cervical diskectomy fusion. 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 The patient also with some Meniere's disease and was continued on as needed meclizine, Keppra, aspirin 81 mg daily, and close neurologic checks. Hospital course was also complicated by a possible neurogenic bladder due to the probable central cord syndrome requiring Lofton catheter placement and initiation of Flomax. Recommendations were for outpatient neurology followup with voiding trial.  The patient also with some constipation and again attributed to probable neurogenic bowel, was put on a bowel regimen. Postoperatively, the patient remained in a cervical collar, noted to have persistent dysphagia, was seen by the swallow therapist with recommendations for further followup with a modified Barium swallow study.   The patient had improvement in his swallowing mechanism and a diet was recommended by swallow therapist.  The patient was treated with daily multivitamins, minerals, thiamine, folic acid for the initial alcohol intoxication, however, on further discussion with the patient's wife, it was noted that the patient had no alcohol use disorder and this was a one-time event. The patient was evaluated by the Physical Therapy, Occupational Therapy, and Speech Therapy Services with recommendations for aggressive rehab at an inpatient rehab facility, preferably [email or web address]]. This request was initially declined by the patient's [place], however, on appeal and further review of the patient's clinical status and the need for aggressive inpatient therapies was approved. n the few days leading up to transfer to this rehab facility, the patient with a brief episode of unresponsiveness at which time it was noted the patient had a normal blood sugar. Patient was re-evaluated by the Neurology Service and deemed to probably have a small seizure for which the Keppra was changed from 500 to 750 mg twice daily. The patient had no recurrence of the unresponsiveness or any other focal neurologic deficits. The patient was continued on a tapering dose of Decadron on this day of transfer to 1 mg daily. After being cleared by Neurology and Neurosurgical Services with plans for outpatient followup, the patient was deemed stable for transfer to the [address] [place] [address] this day, 06/01/2019. PHYSICAL EXAMINATION:  Findings on this day of discharge:  GENERAL:  The patient awake, alert, denying new complaints. VITAL SIGNS:  Blood pressure of 129/77, heart rate 69, respiratory rate 16, afebrile, saturating 99% on room air. HEAD:  Normocephalic. Pupils equal and reactive to light without any nystagmus. NECK:  Cervical collar in situ. CARDIOVASCULAR:  S1 and S2 present. RESPIRATORY:  Lungs with decreased air entry at both bases without any crepitations or rhonchi. GI:  Bowel sounds present. The abdomen is soft and nontender. No rebound, no guarding.   GENITOURINARY:  No suprapubic or flank tenderness. MUSCULOSKELETAL:  No asymmetry of the extremities. CNS:  The patient awake, alert, oriented to time, date, place, person. LABORATORY FINDINGS:  On discharge are as follows:  CBC with a WBC of 7, hemoglobin 13.6, hematocrit 41.7, platelet count of 446. Metabolic panel with a sodium of 137, potassium 3.7, chloride 102, a bicarb of 28, a BUN of 16, creatinine 0.63, a glucose of 87. FINAL DIAGNOSES ON DISCHARGE:  1.  Status post C3-4 anterior cervical diskectomy fusion for central cord syndrome and spinal cord canal change at C3-4 secondary to a fall at home. 2.  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 3.  Probable seizures. Discharged on Keppra twice daily. 4.  Meniere's disease. As needed meclizine for any dizziness. 5.  Status post Lofton catheter placement for neurogenic bladder due to the probable central cord syndrome. 6.  Resolving constipation probably due to neurogenic bowel. 7.  Resolved dysphagia. Able to tolerate a diet. 8.  Primary hypertension. 9.  Resolved alcohol intoxication. 10.  Gastroesophageal reflux disease. DISCHARGE MEDICATIONS:  1. Acetaminophen 650 mg p.o. every 4 hours as needed for pain or fever. 2.  Aspirin 81 mg p.o. daily. 3.  Bisacodyl 5 mg one tablet p.o. daily as needed for constipation. 4.  Dexamethasone 1 mg p.o. daily for 10 days. 5.  Keppra 750 mg oral solution p.o. every 12 hours. 6.  Lidocaine patch to be applied to affected area 12 hours on and 12 hours off.  7.  Meclizine 12.5 mg p.o. daily as needed for dizziness. 8.  Zofran 4 mg p.o. every 8 hours as needed for nausea. 9.  Oxycodone immediate release 5 mg p.o. every 6 hours as needed for severe pain. 10.  Chloraseptic spray one spray to throat every 6 hours as needed. 11.  MiraLax 17 g one packet p.o. daily as needed for constipation. 12.  Senna docusate 1 tablet p.o. everyday as needed for constipation.   13.  Tamsulosin 0.4 mg p.o. daily. The patient is to have a soft diet as tolerated, to do daily physical therapy/occupational therapy/speech therapy as tolerated. The patient is to have an MRI of the brain with and without contrast on 06/15/2019, prior to neurology office visit with Dr. Naima Coleman. The patient is to follow up with PCP in 1 week's time from discharge, neurologist, Dr. Naima Coleman in 2 weeks' time from discharge, neurosurgeon, Dr. Alex De Luna in 1-2 weeks' time from discharge. The patient is to follow up with urologist for voiding trial in 1-2 weeks' time from discharge. The patient is stable for discharge to the [address] [place] [address] on this day, 06/01/2019. The entire discharge plans including all medications, their side effects, the importance of adhering to outpatient followup plans discussed in detail with the patient and the patient's wife, Vamsi Prieto, who can be reached at cell [phone]. All questions and concerns were addressed. Total time for the discharge summary 50 minutes.         Terrence Goldberg, MD [email or web address]  D:  06/01/2019 15:15  T:  06/01/2019 15:26  JOB #:  [id number]

## 2019-06-01 NOTE — PROGRESS NOTES
Problem: Mobility Impaired (Adult and Pediatric)  Goal: *Acute Goals and Plan of Care (Insert Text)  Description  Physical Therapy Goals  Reassessment 5/29/2019  1. Patient will move from supine to sit and sit to supine , scoot up and down and roll side to side in bed with supervision within 7 day(s). 2.  Patient will transfer from bed to chair and chair to bed with contact guard assist using the least restrictive device within 7 day(s). 3.  Patient will perform sit to stand with contact guard assist within 7 day(s). 4.  Patient will ambulate with contact guard assist for 200 feet with the least restrictive device within 7 day(s). 5.  Patient will participate in a strengthening program and be independent within 7 days. Physical Therapy Goals  Initiated 5/18/2019  1. Patient will move from supine to sit and sit to supine , scoot up and down and roll side to side in bed with minimal assistance/contact guard assist within 7 day(s). 2.  Patient will transfer from bed to chair and chair to bed with minimal assistance/contact guard assist using the least restrictive device within 7 day(s). 3.  Patient will perform sit to stand with minimal assistance/contact guard assist within 7 day(s). 4.  Patient will ambulate with minimal assistance/contact guard assist for 150 feet with the least restrictive device within 7 day(s). 5.  Patient will participate in a strengthening program and be independent within 7 days. Outcome: Progressing Towards Goal    PHYSICAL THERAPY TREATMENT  Patient: Hilton Alba (61 y.o. male)  Date: 6/1/2019  Diagnosis: Alcohol intoxication ([facility] [address] [F10.929] Alcohol intoxication delirium ([facility] [address]  Procedure(s) (LRB):  C3-4 ANTERIOR CERVICAL DISCECTOMY FUSION (N/A) 11 Days Post-Op  Precautions: Fall  Chart, physical therapy assessment, plan of care and goals were reviewed. ASSESSMENT:  Patient cleared by nursing to participate.   Patient reported he just returned to bed, but has been up, and willing to take a walk with therapist.  Wife also returned to room, stating that the inpatient rehab appeal met with approval.  Case management has been notified as well to work on discharge planning. Observed that gait was much more stable than in previous session; feels that he has time to acclimate to increased Keppra dose. Patient does report that he feels his UEs are still weak. Anticipate continued progress at rehab. Progression toward goals:  ?    Improving appropriately and progressing toward goals  ? Improving slowly and progressing toward goals  ? Not making progress toward goals and plan of care will be adjusted     PLAN:  Patient continues to benefit from skilled intervention to address the above impairments. Continue treatment per established plan of care. Discharge Recommendations:  Inpatient Rehab  Further Equipment Recommendations for Discharge:  TBD at rehab      SUBJECTIVE:   Patient stated ? I can do a walk.?    OBJECTIVE DATA SUMMARY:   Critical Behavior:  Neurologic State: Alert  Orientation Level: Oriented X4  Cognition: Appropriate decision making  Safety/Judgement: Insight into deficits  Functional Mobility Training:  Bed Mobility:  Rolling: Supervision  Supine to Sit: Minimum assistance  Sit to Supine: Stand-by assistance           Transfers:  Sit to Stand: Contact guard assistance  Stand to Sit: Supervision                             Balance:  Sitting: Intact  Sitting - Static: Good (unsupported)  Sitting - Dynamic: Good (unsupported)  Standing - Static: Fair  Standing - Dynamic : Fair  Ambulation/Gait Training:  Distance (ft): 300 Feet (ft)  Assistive Device: Brace/Splint;Gait belt  Ambulation - Level of Assistance: Contact guard assistance        Gait Abnormalities: Shuffling gait; Path deviations(deviates with fatigue)        Base of Support: Narrowed     Speed/Olive: Shuffled;Fluctuations  Step Length: Left shortened;Right shortened Pain:  Pain Scale 1: Numeric (0 - 10)  Pain Intensity 1: 3  Pain Location 1: Shoulder  Pain Orientation 1: Left;Right  Pain Description 1: Aching     Activity Tolerance:   Good    Please refer to the flowsheet for vital signs taken during this treatment. After treatment:   ?    Patient left in no apparent distress sitting up in chair  ? Patient left in no apparent distress in bed  ? Call bell left within reach  ? Nursing notified  ? Caregiver present  ?     Bed alarm activated    COMMUNICATION/COLLABORATION:   The patient?s plan of care was discussed with: Registered Nurse    Erika Stewart, PT   Time Calculation: 15 mins

## 2019-06-01 NOTE — PROGRESS NOTES
Hospitalist Progress Note                               Gera Carrillo MD                                     Answering service: [phone]                               OR [phone] from in house phone                                         Date of Service:  2019  NAME:  Mallory Jett  :  1950  MRN:  [id number]      Admission Summary:   77 Y/O gentleman with a PMH significant for Meniere's disease, hypertension and GERD  was found down in his backyard unresponsive with foam at the mouth. EMS was called and patient was brought to [facility] ED and he was found to have an alcohol level of 162. Neurology was consulted for possible seizure and he was started on Keppra. After recovery from his intoxication and lethargy his family noticed he had weakness in his arms. It was also noted that he had urinary retention and difficulty swallowing. MRI of the cervical spine was completed which showed disc herniation at C3-C4 level with some edema in the central cord. Reason for follow up:       CC:Unresponsiveness    All events in the past 24 hours reviewed in their entirety. Patient stated that he slept ok. Denied any new complaints. Assessment & Plan:     1) CNS: Central cord Syndrome with spinal cord Canal change at C3-4 after a fall at home. MRI positive for C3-4 herniated disc with cord edema and cervical stenosis. Day # 10 S/P C3-4 Anterior Cervical Discectomy Fusion. 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 Menieres Disease. Continue  Keppra, Aspirin, as needed Meclizine and neurochecks. Neurology and Neurosurgery F/Us noted and appreciated. Will need F/U MRI brain W/WO contrast about 6/15/19 per Neurology. 2) : S/P [place] Catheter placement for  Neurogenic bladder due to the probable Central cord syndrome. Outpatient Urology F/U with voiding trial.    3) CVS: Primary Hypertension.     4) GI: Resolving Constipation probably due to Neurogenic bowel. Continue bowel regimen. H/O GERD. Resolving dysphagia. Tolerating diet    5) Social: Alcohol Use Disorder. Daily MVI/minerals, Thiamine and Folic Acid. Cessation counselling done. 6) Prophylaxis: DVT. 7) Rehab: Daily PT/OT and speech therapy as tolerated. 8) Directives: Full Code with a very guarded prognosis. D/W patient. 9) Plan: Patient will benefit from Inpatient Rehab for ongoing therapies. However, [facility] declined. Patient and wife reviewing other options whilst Appeal pending. At increased risk of decompensation and readmission. Discussed in detail with patient, patient's wife Christina Dan who can be reached at [phone] and patient's son. Hospital Problems  Date Reviewed: 5/20/2019          Codes Class Noted POA    Spinal cord injury at C1-C4 level [facility]) ICD-10-CM: S14.101A  ICD-9-CM: 952.00  5/22/2019 Unknown        * (Principal) Alcohol intoxication delirium ([facility] [address] ICD-10-CM: F10.121  ICD-9-CM: 291.0  5/15/2019 Yes        Alcohol intoxication ([facility] [address] ICD-10-CM: M90.760  ICD-9-CM: 305.00  5/15/2019 Unknown                Physical Examination:      Last 24hrs VS reviewed since prior progress note. Most recent are:  Visit Vitals  /78 (BP 1 Location: Right arm, BP Patient Position: At rest)   Pulse 70   Temp 97.9 °F (36.6 °C)   Resp 16   Ht 5' 8\" (1.727 m)   Wt 64.3 kg (141 lb 11.2 oz)   SpO2 98%   BMI 21.55 kg/m²           Constitutional:  No acute distress, cooperative, pleasant    HEENT: Head is a traumatic,  Un icteric sclera. Pink conjunctiva,no erythema or discharge. Oral mucous moist, oropharynx benign. Neck supple,   NECK: Cervical Collar in Situ. Resp:  Decreased air entry Bibasilarly   CV:  Regular rhythm, normal rate, no murmurs, gallops, rubs    GI:  Soft, non distended, non tender.  normoactive bowel sounds, no hepatosplenomegaly    :  No CVA or suprapubic tenderness   Skin  :  No erythema,rash,bullae,dipigmentation     Musculoskeletal:  Bipedal edema. Neurologic:  Awake, alert and oriented. Intake/Output Summary (Last 24 hours) at 6/1/2019 1145  Last data filed at 6/1/2019 0550  Gross per 24 hour   Intake --   Output 1100 ml   Net -1100 ml          Labs:     Recent Labs     06/01/19  0550 05/31/19  0404   WBC 7.0 13.7*   HGB 13.6 13.3   HCT 41.7 41.0    202     Recent Labs     06/01/19  0550 05/31/19  0404 05/30/19  1432    134* 136   K 3.7 3.5 3.4*    99 101   CO2 28 28 29   BUN 16 18 21*   CREA 0.63* 0.63* 0.69*   GLU 87 104* 109*   CA 8.7 8.7 8.5   MG  --   --  2.4     No results for input(s): SGOT, GPT, ALT, AP, TBIL, TBILI, TP, ALB, GLOB, GGT, AML, LPSE in the last 72 hours. No lab exists for component: AMYP, HLPSE  No results for input(s): INR, PTP, APTT in the last 72 hours. No lab exists for component: INREXT, INREXT   No results for input(s): FE, TIBC, PSAT, FERR in the last 72 hours. No results found for: FOL, RBCF   No results for input(s): PH, PCO2, PO2 in the last 72 hours. No results for input(s): CPK, CKNDX, TROIQ in the last 72 hours.     No lab exists for component: CPKMB  Lab Results   Component Value Date/Time    Cholesterol, total 129 05/15/2019 01:54 AM    HDL Cholesterol 54 05/15/2019 01:54 AM    LDL, calculated 65 05/15/2019 01:54 AM    Triglyceride 50 05/15/2019 01:54 AM    CHOL/HDL Ratio 2.4 05/15/2019 01:54 AM     Lab Results   Component Value Date/Time    Glucose (POC) 123 (H) 05/30/2019 12:32 PM    Glucose (POC) 119 (H) 05/28/2019 06:56 AM    Glucose (POC) 148 (H) 05/18/2019 04:47 PM    Glucose (POC) 170 (H) 05/18/2019 12:37 PM    Glucose (POC) 114 (H) 05/17/2019 04:42 PM     Lab Results   Component Value Date/Time    Color YELLOW/STRAW 05/14/2019 08:14 PM    Appearance CLEAR 05/14/2019 08:14 PM    Specific gravity 1.016 05/14/2019 08:14 PM    pH (UA) 5.5 05/14/2019 08:14 PM    Protein NEGATIVE  05/14/2019 08:14 PM Glucose NEGATIVE  05/14/2019 08:14 PM    Ketone 15 (A) 05/14/2019 08:14 PM    Bilirubin NEGATIVE  05/14/2019 08:14 PM    Urobilinogen 0.2 05/14/2019 08:14 PM    Nitrites NEGATIVE  05/14/2019 08:14 PM    Leukocyte Esterase NEGATIVE  05/14/2019 08:14 PM         Medications Reviewed:     Current Facility-Administered Medications   Medication Dose Route Frequency    hydroCHLOROthiazide (HYDRODIURIL) tablet 25 mg  25 mg Oral DAILY    bisacodyl (DULCOLAX) suppository 10 mg  10 mg Rectal EVERY OTHER DAY    pantoprazole (PROTONIX) tablet 40 mg  40 mg Oral ACB    levETIRAcetam (KEPPRA) oral solution 750 mg  750 mg Oral Q12H    docusate (COLACE) 50 mg/5 mL oral liquid 100 mg  100 mg Oral BID    dexamethasone (DECADRON) tablet 1 mg  1 mg Oral DAILY    enoxaparin (LOVENOX) injection 40 mg  40 mg SubCUTAneous Q24H    lactulose (CHRONULAC) 10 gram/15 mL solution 45 mL  30 g Oral DAILY PRN    mineral oil (FLEET) enema   Rectal PRN    aspirin chewable tablet 81 mg  81 mg Oral DAILY    tamsulosin (FLOMAX) capsule 0.4 mg  0.4 mg Oral DAILY    lidocaine (XYLOCAINE) 2 % viscous solution 15 mL  15 mL Other PRN    polyethylene glycol (MIRALAX) packet 17 g  17 g Oral DAILY    senna (SENOKOT) tablet 17.2 mg  2 Tab Oral DAILY    lidocaine (LIDODERM) 5 % patch 2 Patch  2 Patch TransDERmal Q24H    acetaminophen (TYLENOL) solution 650 mg  650 mg Per NG tube Q6H    meclizine (ANTIVERT) tablet 12.5 mg  12.5 mg Oral Q6H PRN    HYDROmorphone (PF) (DILAUDID) injection 0.5 mg  0.5 mg IntraVENous Q3H PRN    sodium chloride (NS) flush 5-40 mL  5-40 mL IntraVENous Q8H    sodium chloride (NS) flush 5-40 mL  5-40 mL IntraVENous PRN    naloxone (NARCAN) injection 0.4 mg  0.4 mg IntraVENous PRN    oxyCODONE IR (ROXICODONE) tablet 5 mg  5 mg Oral Q3H PRN    HYDROmorphone (DILAUDID) tablet 4 mg  4 mg Oral Q3H PRN    acetaminophen (TYLENOL) suppository 650 mg  650 mg Rectal W7B PRN    folic acid (FOLVITE) tablet 1 mg  1 mg Oral DAILY  thiamine HCL (B-1) tablet 100 mg  100 mg Oral DAILY    pantothenic ac-min oil-pet,hyd (AQUAPHOR) 41 % ointment   Topical PRN    hydrALAZINE (APRESOLINE) 20 mg/mL injection 10 mg  10 mg IntraVENous Q4H PRN    metoprolol (LOPRESSOR) injection 2.5 mg  2.5 mg IntraVENous Q6H PRN    sodium chloride (NS) flush 5-40 mL  5-40 mL IntraVENous Q8H    sodium chloride (NS) flush 5-40 mL  5-40 mL IntraVENous PRN    ondansetron (ZOFRAN) injection 4 mg  4 mg IntraVENous Q4H PRN    naloxone (NARCAN) injection 0.4 mg  0.4 mg IntraVENous PRN    bisacodyl (DULCOLAX) tablet 5 mg  5 mg Oral DAILY PRN    acetaminophen (TYLENOL) tablet 650 mg  650 mg Oral Q4H PRN    prochlorperazine (COMPAZINE) tablet 10 mg  10 mg Oral Q6H PRN    phenol throat spray (CHLORASEPTIC) 1 Spray  1 Spray Oral PRN     ______________________________________________________________________  EXPECTED LENGTH OF STAY: 4d 21h  ACTUAL LENGTH OF STAY:          David Hilliard MD

## 2019-06-01 NOTE — DISCHARGE INSTRUCTIONS
After Hospital Care Plan:  Discharge Instructions Cervical (Neck) Spine Surgery Dr. Mil Javed    Patient Name: Katy Macdonald    Date of procedure: 5/21/2019      Date of discharge:     Procedure: Procedure(s):  C3-4 ANTERIOR CERVICAL DISCECTOMY FUSION      PCP: Miranda Herr MD      Follow up appointments  -follow up with Dr. Mil Javed in 2 weeks. [phone] to make an appointment as soon as you get home from the hospital.    When to call your Spine Surgeon:  -Difficulty swallowing that is worse than when you left the hospital.  -Signs of infection-if your incision is red; continues to have drainage; drainage has a foul odor or if you have a persistent fever over 101 degrees for 24 hours  -nausea or vomiting, severe headache  -changes in sensation in your arms or legs (numbness, tingling, loss of color)  -increased weakness-greater than before your surgery  -severe pain or pain not relieved by medications  -Signs of a blood clot in your leg-calf pain, tenderness, redness, swelling of lower leg    When to call your Primary Care Physician:  -Concerns about medical conditions such as diabetes, high blood pressure, asthma, congestive heart failure  -Call if blood sugars are elevated, persistent headache or dizziness, coughing or congestion, constipation or diarrhea, burning with urination, abnormal heart rate    When to call 911 and go to the nearest emergency room:  -acute onset of chest pain, shortness of breath, difficulty breathing    Activity  - You are going home a well person, be as active as possible. Your only exercise should be walking. Start with short frequent walks and increase your walking distance each day.  -Limit the amount of time you sit to 20-30 minute intervals. Sitting for prolonged periods of time will be uncomfortable for you following surgery.   - Do NOT lift anything over 5 pounds  -From now on, even when lifting light weight, bend with your knees and not your back.  -Do NOT do any neck exercises until you have been instructed by your doctor  -When you are in bed, you may lay on your back or on either side. Do NOT lie on your stomach    Cervical Collar (Aspen Collar)  -You are required to wear your cervical collar at all times; except when showering. You may remove the collar long enough to change the pads when needed and to change your dressing each day. -Do not bend or twist when your collar is off. It is best to have someone assist you when changing the pads and your dressing to prevent you from bending your neck. - Clean the pads on your neck collar every day by hand washing with a mild soap and water. Pat them dry with a towel and lay out to air dry. Do not use heat to dry the pads. Diet  -resume usual diet; drink plenty of fluids; eat foods high in fiber  -It is important to have regular bowel movements. Pain medications may cause constipation. You may want to take a stool softener (such as Senokot-S or Colace) to prevent constipation.  -If constipation occurs, take a laxative (such as Dulcolax tablets, Milk of Magnesia, or a suppository). Laxatives should only be used if the above preventable measures have failed and you still have not had a bowel movement after three days    Driving  -You may not drive or return to work until instructed by your physician. However, you may ride in the car for short periods of time. Incision Care  -You may take brief showers but do not run the water run directly onto the wound.  After showering or bathing gently blot the wound dry with a soft towel.  -Do not rub or apply any lotions or ointments to your incision site.   -Do not soak or scrub your wound; do not swim until the adhesive film has fallen off naturally  -Do not scratch, rub, or pick at the wound or skin glue, doing so may loosen the film before the wound is fully healed  -Stay out of direct sunlight and do not use tanning lamps     Showering  -You may shower in approximately 4 days after your surgery.    -Reminder- Make sure you put clean pads on your collar after your shower.    -Do not take a tub bath. Preventing blood clots  -You have been given T.E.D. stockings to wear. Continue to wear these for 7 days after your discharge. Put them on in the morning and take them off at night.    -They are used to increase your circulation and prevent blood clots from forming in your legs  -T. E.D. stockings can be machine washed, temperature not to exceed 160° F (71°C) and machine dried for 15 to 20 minutes, temperature not to exceed 250° F (121°C). Pain management  -Take pain medication as prescribed; decrease the amount you use as your pain lessens.  -Do not wait until you are in extreme pain to take your medication.  -Avoid alcoholic beverages while taking pain medication. Pain Medication Safety  DO:  -Read the Medication Guide   -Take your medicine exactly as prescribed   -Store your medicine away from children and in a safe place   -Flush unused medicine down the toilet   -Call your healthcare provider for medical advice about side effects. You may report side effects to FDA at 4-805-FDA-2269.   -Please be aware that many medications contain Tylenol. We do not want you to over medicate so please read the information below as a guide. Do not take more than 4 Grams of Tylenol in a 24 hour period.   (There are 1000 milligrams in one Gram)                                                                                                                                                                                                                                                                                                                                                                  Percocet contains 325 mg of Tylenol per tablet (do not take more than 12 tablets in 24 hours)  Lortab contains 500 mg of Tylenol per tablet (do not take more than 8 tablets in 24 hours)  Norco contains 325 mg of Tylenol per tablet (do not take more than 12 tablets in 24 hours). DO NOT:  -Do not give your medicine to others   -Do not take medicine unless it was prescribed for you   -Do not stop taking your medicine without talking to your healthcare provider   -Do not break, chew, crush, dissolve, or inject your medicine. If you cannot  swallow your medicine whole, talk to your healthcare provider.  -Do not drink alcohol while taking this medicine  -Do not take anti-inflammatory medications or aspirin unless instructed by your   Physician    Patient to do daily PT/OT and Speech therapy as tolerated. Patient to have an MRI of brain with and without contrast about 6/15/19 prior to Neurology Office visit with Dr Jen Monk. Patient to follow up with PCP in 1 week. Patient to follow up with Neurologist Dr Jen Mnok in 2 weeks. Patient to follow up with NeuroSurgeon Dr Serafin Ritchie in 1-2 weeks.   Patient to follow up with Urologist for a voiding trial.

## 2019-06-01 NOTE — PROGRESS NOTES
Occupational Therapy: Nurse preparing to give patient an enema. Will follow and see as able and appropriate.    YUNG Anthony/TAMANNA

## 2019-06-01 NOTE — PROGRESS NOTES
CM follow up. CM spoke with patient's wife who said that she had received a call this morning from [facility] [employer] with result to her appeal of the denial of authorization to [address] [place] [address]. The appeal was successful and the [facility] authorization number is [id number]. CM spoke with Jey Carlin/Becca Liaison,  who said that patient is accepted and bed is available today. Receiving physician is Viry Mckeon MD/ Nurse Report to be called to [phone]. CM sent referral via [facility] for BLS transport to [address] [place] [address] ([phone]), and referral was accepted for  A 4:30PM . CM completed PCS and placed it on chart. CM completed CM portion of Emtala. CM gave update to staff nurse.

## 2019-06-01 NOTE — PROGRESS NOTES
0900 - Pt coughs after drinking thin liquids from small straw. Thickener  Requested from dining. [address] called to give report to Darlin Jacobson RN. She is to return call. 1652 - TRANSFER - OUT REPORT:    Verbal report given to Yudi Donnelly(name) on [place]  being transferred to [facility](unit) for routine progression of care       Report consisted of patients Situation, Background, Assessment and   Recommendations(SBAR). Information from the following report(s) SBAR, Kardex, ED Summary, OR Summary, Procedure Summary, Intake/Output, MAR, Accordion, Recent Results and Med Rec Status was reviewed with the receiving nurse. Lines:   Peripheral IV 05/21/19 Right Wrist (Active)   Site Assessment Clean, dry, & intact 6/1/2019  8:30 AM   Phlebitis Assessment 0 6/1/2019  8:30 AM   Infiltration Assessment 0 6/1/2019  8:30 AM   Dressing Status Clean, dry, & intact 6/1/2019  8:30 AM   Dressing Type Transparent 6/1/2019  8:30 AM   Hub Color/Line Status Patent;Capped 6/1/2019  8:30 AM   Action Taken Open ports on tubing capped 6/1/2019  8:30 AM   Alcohol Cap Used Yes 6/1/2019  8:30 AM        Opportunity for questions and clarification was provided.       Patient transported with: EMS personnel

## 2019-06-02 LAB
ALBUMIN SERPL-MCNC: 2.9 G/DL (ref 3.5–5)
ALBUMIN/GLOB SERPL: 1 {RATIO} (ref 1.1–2.2)
ALP SERPL-CCNC: 73 U/L (ref 45–117)
ALT SERPL-CCNC: 139 U/L (ref 12–78)
ANION GAP SERPL CALC-SCNC: 4 MMOL/L (ref 5–15)
AST SERPL-CCNC: 44 U/L (ref 15–37)
BILIRUB SERPL-MCNC: 0.6 MG/DL (ref 0.2–1)
BUN SERPL-MCNC: 11 MG/DL (ref 6–20)
BUN/CREAT SERPL: 17 (ref 12–20)
CALCIUM SERPL-MCNC: 8.5 MG/DL (ref 8.5–10.1)
CHLORIDE SERPL-SCNC: 102 MMOL/L (ref 97–108)
CO2 SERPL-SCNC: 29 MMOL/L (ref 21–32)
CREAT SERPL-MCNC: 0.66 MG/DL (ref 0.7–1.3)
ERYTHROCYTE [DISTWIDTH] IN BLOOD BY AUTOMATED COUNT: 13.7 % (ref 11.5–14.5)
GLOBULIN SER CALC-MCNC: 2.9 G/DL (ref 2–4)
GLUCOSE SERPL-MCNC: 93 MG/DL (ref 65–100)
HCT VFR BLD AUTO: 42.9 % (ref 36.6–50.3)
HGB BLD-MCNC: 14.1 G/DL (ref 12.1–17)
MAGNESIUM SERPL-MCNC: 2.1 MG/DL (ref 1.6–2.4)
MCH RBC QN AUTO: 29.1 PG (ref 26–34)
MCHC RBC AUTO-ENTMCNC: 32.9 G/DL (ref 30–36.5)
MCV RBC AUTO: 88.5 FL (ref 80–99)
NRBC # BLD: 0 K/UL (ref 0–0.01)
NRBC BLD-RTO: 0 PER 100 WBC
PLATELET # BLD AUTO: 184 K/UL (ref 150–400)
PMV BLD AUTO: 10.1 FL (ref 8.9–12.9)
POTASSIUM SERPL-SCNC: 3.2 MMOL/L (ref 3.5–5.1)
PROT SERPL-MCNC: 5.8 G/DL (ref 6.4–8.2)
RBC # BLD AUTO: 4.85 M/UL (ref 4.1–5.7)
SODIUM SERPL-SCNC: 135 MMOL/L (ref 136–145)
WBC # BLD AUTO: 6.4 K/UL (ref 4.1–11.1)

## 2019-06-02 PROCEDURE — 83735 ASSAY OF MAGNESIUM: CPT

## 2019-06-02 PROCEDURE — 36415 COLL VENOUS BLD VENIPUNCTURE: CPT

## 2019-06-02 PROCEDURE — 85027 COMPLETE CBC AUTOMATED: CPT

## 2019-06-02 PROCEDURE — 80053 COMPREHEN METABOLIC PANEL: CPT

## 2019-06-03 LAB
BACTERIA SPEC CULT: NORMAL
CC UR VC: NORMAL
SERVICE CMNT-IMP: NORMAL

## 2019-06-08 LAB
ALBUMIN SERPL-MCNC: 3 G/DL (ref 3.5–5)
ALBUMIN/GLOB SERPL: 1 {RATIO} (ref 1.1–2.2)
ALP SERPL-CCNC: 71 U/L (ref 45–117)
ALT SERPL-CCNC: 64 U/L (ref 12–78)
ANION GAP SERPL CALC-SCNC: 6 MMOL/L (ref 5–15)
AST SERPL-CCNC: 12 U/L (ref 15–37)
BILIRUB SERPL-MCNC: 0.5 MG/DL (ref 0.2–1)
BUN SERPL-MCNC: 17 MG/DL (ref 6–20)
BUN/CREAT SERPL: 30 (ref 12–20)
CALCIUM SERPL-MCNC: 8.4 MG/DL (ref 8.5–10.1)
CHLORIDE SERPL-SCNC: 105 MMOL/L (ref 97–108)
CO2 SERPL-SCNC: 27 MMOL/L (ref 21–32)
CREAT SERPL-MCNC: 0.56 MG/DL (ref 0.7–1.3)
GLOBULIN SER CALC-MCNC: 2.9 G/DL (ref 2–4)
GLUCOSE SERPL-MCNC: 126 MG/DL (ref 65–100)
POTASSIUM SERPL-SCNC: 4.2 MMOL/L (ref 3.5–5.1)
PROT SERPL-MCNC: 5.9 G/DL (ref 6.4–8.2)
SODIUM SERPL-SCNC: 138 MMOL/L (ref 136–145)

## 2019-06-08 PROCEDURE — 36415 COLL VENOUS BLD VENIPUNCTURE: CPT

## 2019-06-08 PROCEDURE — 80053 COMPREHEN METABOLIC PANEL: CPT

## 2019-06-12 ENCOUNTER — HOSPITAL ENCOUNTER (OUTPATIENT)
Dept: GENERAL RADIOLOGY | Age: 69
Discharge: HOME | End: 2019-06-12
Attending: PHYSICAL MEDICINE & REHABILITATION
Payer: MEDICARE

## 2019-06-12 DIAGNOSIS — R13.10 DYSPHAGIA: ICD-10-CM

## 2019-06-12 LAB
ANION GAP SERPL CALC-SCNC: 5 MMOL/L (ref 5–15)
BUN SERPL-MCNC: 14 MG/DL (ref 6–20)
BUN/CREAT SERPL: 26 (ref 12–20)
CALCIUM SERPL-MCNC: 8.5 MG/DL (ref 8.5–10.1)
CHLORIDE SERPL-SCNC: 106 MMOL/L (ref 97–108)
CO2 SERPL-SCNC: 26 MMOL/L (ref 21–32)
CREAT SERPL-MCNC: 0.54 MG/DL (ref 0.7–1.3)
ERYTHROCYTE [DISTWIDTH] IN BLOOD BY AUTOMATED COUNT: 14.2 % (ref 11.5–14.5)
GLUCOSE SERPL-MCNC: 112 MG/DL (ref 65–100)
HCT VFR BLD AUTO: 40.1 % (ref 36.6–50.3)
HGB BLD-MCNC: 13.1 G/DL (ref 12.1–17)
MCH RBC QN AUTO: 28.9 PG (ref 26–34)
MCHC RBC AUTO-ENTMCNC: 32.7 G/DL (ref 30–36.5)
MCV RBC AUTO: 88.5 FL (ref 80–99)
NRBC # BLD: 0 K/UL (ref 0–0.01)
NRBC BLD-RTO: 0 PER 100 WBC
PLATELET # BLD AUTO: 197 K/UL (ref 150–400)
PMV BLD AUTO: 10.2 FL (ref 8.9–12.9)
POTASSIUM SERPL-SCNC: 3.9 MMOL/L (ref 3.5–5.1)
RBC # BLD AUTO: 4.53 M/UL (ref 4.1–5.7)
SODIUM SERPL-SCNC: 137 MMOL/L (ref 136–145)
WBC # BLD AUTO: 11.1 K/UL (ref 4.1–11.1)

## 2019-06-12 PROCEDURE — 36415 COLL VENOUS BLD VENIPUNCTURE: CPT

## 2019-06-12 PROCEDURE — 80048 BASIC METABOLIC PNL TOTAL CA: CPT

## 2019-06-12 PROCEDURE — 85027 COMPLETE CBC AUTOMATED: CPT

## 2019-06-12 PROCEDURE — 74230 X-RAY XM SWLNG FUNCJ C+: CPT

## 2019-06-12 PROCEDURE — 92611 MOTION FLUOROSCOPY/SWALLOW: CPT

## 2019-06-12 PROCEDURE — 80177 DRUG SCRN QUAN LEVETIRACETAM: CPT

## 2019-06-13 LAB — LEVETIRACETAM SERPL-MCNC: 19.9 UG/ML (ref 10–40)

## 2019-09-10 ENCOUNTER — HOSPITAL ENCOUNTER (OUTPATIENT)
Dept: GENERAL RADIOLOGY | Age: 69
Discharge: HOME | End: 2019-09-10
Attending: PHYSICAL MEDICINE & REHABILITATION
Payer: MEDICARE

## 2019-09-10 DIAGNOSIS — R13.10 DYSPHAGIA: ICD-10-CM

## 2019-09-10 PROCEDURE — 74230 X-RAY XM SWLNG FUNCJ C+: CPT

## 2019-09-10 PROCEDURE — 92611 MOTION FLUOROSCOPY/SWALLOW: CPT

## 2019-11-12 ENCOUNTER — HOSPITAL ENCOUNTER (OUTPATIENT)
Dept: MRI IMAGING | Age: 69
Discharge: HOME | End: 2019-11-12
Attending: NEUROLOGICAL SURGERY
Payer: MEDICARE

## 2019-11-12 DIAGNOSIS — G95.9 MYELOPATHY (HCC): ICD-10-CM

## 2019-11-12 DIAGNOSIS — R26.2 DIFFICULTY WALKING: ICD-10-CM

## 2019-11-12 PROCEDURE — 72148 MRI LUMBAR SPINE W/O DYE: CPT

## 2019-11-12 PROCEDURE — 72141 MRI NECK SPINE W/O DYE: CPT

## 2019-11-12 PROCEDURE — 72146 MRI CHEST SPINE W/O DYE: CPT

## 2022-03-18 PROBLEM — S14.101A SPINAL CORD INJURY AT C1-C4 LEVEL (HCC): Status: ACTIVE | Noted: 2019-05-22

## 2022-03-18 PROBLEM — F10.121 ALCOHOL INTOXICATION DELIRIUM (HCC): Status: ACTIVE | Noted: 2019-05-15

## 2022-03-20 PROBLEM — F10.929 ALCOHOL INTOXICATION (HCC): Status: ACTIVE | Noted: 2019-05-15

## 2022-08-31 ENCOUNTER — ANESTHESIA (OUTPATIENT)
Dept: ENDOSCOPY | Age: 72
End: 2022-08-31
Payer: MEDICARE

## 2022-08-31 ENCOUNTER — HOSPITAL ENCOUNTER (OUTPATIENT)
Age: 72
Setting detail: OUTPATIENT SURGERY
Discharge: HOME | End: 2022-08-31
Attending: INTERNAL MEDICINE | Admitting: INTERNAL MEDICINE
Payer: MEDICARE

## 2022-08-31 ENCOUNTER — ANESTHESIA EVENT (OUTPATIENT)
Dept: ENDOSCOPY | Age: 72
End: 2022-08-31
Payer: MEDICARE

## 2022-08-31 VITALS
TEMPERATURE: 97.8 F | RESPIRATION RATE: 20 BRPM | WEIGHT: 134 LBS | BODY MASS INDEX: 20.31 KG/M2 | DIASTOLIC BLOOD PRESSURE: 84 MMHG | SYSTOLIC BLOOD PRESSURE: 119 MMHG | OXYGEN SATURATION: 97 % | HEIGHT: 68 IN | HEART RATE: 65 BPM

## 2022-08-31 PROCEDURE — 76060000031 HC ANESTHESIA FIRST 0.5 HR: Performed by: INTERNAL MEDICINE

## 2022-08-31 PROCEDURE — 88305 TISSUE EXAM BY PATHOLOGIST: CPT

## 2022-08-31 PROCEDURE — 77030037041 HC FCPS HOT BIOP ENDOSC RAD JAW DISP BSC -B: Performed by: INTERNAL MEDICINE

## 2022-08-31 PROCEDURE — 74011250636 HC RX REV CODE- 250/636: Performed by: NURSE ANESTHETIST, CERTIFIED REGISTERED

## 2022-08-31 PROCEDURE — 76040000019: Performed by: INTERNAL MEDICINE

## 2022-08-31 PROCEDURE — 74011000250 HC RX REV CODE- 250: Performed by: NURSE ANESTHETIST, CERTIFIED REGISTERED

## 2022-08-31 PROCEDURE — 77030037345 HC NET FB ENDO RETVR RESCUNT DISP BSC -B: Performed by: INTERNAL MEDICINE

## 2022-08-31 PROCEDURE — 2709999900 HC NON-CHARGEABLE SUPPLY: Performed by: INTERNAL MEDICINE

## 2022-08-31 RX ORDER — SODIUM CHLORIDE 0.9 % (FLUSH) 0.9 %
5-40 SYRINGE (ML) INJECTION EVERY 8 HOURS
Status: DISCONTINUED | OUTPATIENT
Start: 2022-08-31 | End: 2022-08-31 | Stop reason: HOSPADM

## 2022-08-31 RX ORDER — SILDENAFIL CITRATE 20 MG/1
20 TABLET ORAL AS NEEDED
COMMUNITY

## 2022-08-31 RX ORDER — FLUMAZENIL 0.1 MG/ML
0.2 INJECTION INTRAVENOUS
Status: DISCONTINUED | OUTPATIENT
Start: 2022-08-31 | End: 2022-08-31 | Stop reason: HOSPADM

## 2022-08-31 RX ORDER — SODIUM CHLORIDE 9 MG/ML
INJECTION, SOLUTION INTRAVENOUS
Status: DISCONTINUED | OUTPATIENT
Start: 2022-08-31 | End: 2022-08-31 | Stop reason: HOSPADM

## 2022-08-31 RX ORDER — SODIUM CHLORIDE 0.9 % (FLUSH) 0.9 %
5-40 SYRINGE (ML) INJECTION AS NEEDED
Status: DISCONTINUED | OUTPATIENT
Start: 2022-08-31 | End: 2022-08-31 | Stop reason: HOSPADM

## 2022-08-31 RX ORDER — PROPOFOL 10 MG/ML
INJECTION, EMULSION INTRAVENOUS AS NEEDED
Status: DISCONTINUED | OUTPATIENT
Start: 2022-08-31 | End: 2022-08-31 | Stop reason: HOSPADM

## 2022-08-31 RX ORDER — EPINEPHRINE 0.1 MG/ML
1 INJECTION INTRACARDIAC; INTRAVENOUS
Status: DISCONTINUED | OUTPATIENT
Start: 2022-08-31 | End: 2022-08-31 | Stop reason: HOSPADM

## 2022-08-31 RX ORDER — ATROPINE SULFATE 0.1 MG/ML
0.5 INJECTION INTRAVENOUS
Status: DISCONTINUED | OUTPATIENT
Start: 2022-08-31 | End: 2022-08-31 | Stop reason: HOSPADM

## 2022-08-31 RX ORDER — DEXTROMETHORPHAN/PSEUDOEPHED 2.5-7.5/.8
1.2 DROPS ORAL
Status: DISCONTINUED | OUTPATIENT
Start: 2022-08-31 | End: 2022-08-31 | Stop reason: HOSPADM

## 2022-08-31 RX ORDER — LIDOCAINE HYDROCHLORIDE 20 MG/ML
INJECTION, SOLUTION EPIDURAL; INFILTRATION; INTRACAUDAL; PERINEURAL AS NEEDED
Status: DISCONTINUED | OUTPATIENT
Start: 2022-08-31 | End: 2022-08-31 | Stop reason: HOSPADM

## 2022-08-31 RX ORDER — NALOXONE HYDROCHLORIDE 0.4 MG/ML
0.4 INJECTION, SOLUTION INTRAMUSCULAR; INTRAVENOUS; SUBCUTANEOUS
Status: DISCONTINUED | OUTPATIENT
Start: 2022-08-31 | End: 2022-08-31 | Stop reason: HOSPADM

## 2022-08-31 RX ORDER — SODIUM CHLORIDE 9 MG/ML
50 INJECTION, SOLUTION INTRAVENOUS CONTINUOUS
Status: DISCONTINUED | OUTPATIENT
Start: 2022-08-31 | End: 2022-08-31 | Stop reason: HOSPADM

## 2022-08-31 RX ORDER — FACIAL-BODY WIPES
10 EACH TOPICAL
COMMUNITY

## 2022-08-31 RX ADMIN — PROPOFOL 30 MG: 10 INJECTION, EMULSION INTRAVENOUS at 08:57

## 2022-08-31 RX ADMIN — PROPOFOL 30 MG: 10 INJECTION, EMULSION INTRAVENOUS at 08:59

## 2022-08-31 RX ADMIN — PROPOFOL 30 MG: 10 INJECTION, EMULSION INTRAVENOUS at 09:10

## 2022-08-31 RX ADMIN — PROPOFOL 30 MG: 10 INJECTION, EMULSION INTRAVENOUS at 09:02

## 2022-08-31 RX ADMIN — LIDOCAINE HYDROCHLORIDE 50 MG: 20 INJECTION, SOLUTION EPIDURAL; INFILTRATION; INTRACAUDAL; PERINEURAL at 08:51

## 2022-08-31 RX ADMIN — PROPOFOL 30 MG: 10 INJECTION, EMULSION INTRAVENOUS at 09:07

## 2022-08-31 RX ADMIN — SODIUM CHLORIDE: 900 INJECTION, SOLUTION INTRAVENOUS at 08:38

## 2022-08-31 RX ADMIN — PROPOFOL 30 MG: 10 INJECTION, EMULSION INTRAVENOUS at 09:04

## 2022-08-31 RX ADMIN — PROPOFOL 100 MG: 10 INJECTION, EMULSION INTRAVENOUS at 08:51

## 2022-08-31 RX ADMIN — PROPOFOL 30 MG: 10 INJECTION, EMULSION INTRAVENOUS at 08:55

## 2022-08-31 RX ADMIN — PROPOFOL 30 MG: 10 INJECTION, EMULSION INTRAVENOUS at 08:53

## 2022-08-31 NOTE — ROUTINE PROCESS
Micaela Foster  1950  [id number]    Situation:  Verbal report received from: Tammy Kuo RN  Procedure: Procedure(s):  COLONOSCOPY  COLON BIOPSY    Background:    Preoperative diagnosis: Special screening for malignant neoplasms, colon [Z12.11]  Postoperative diagnosis: 1. Retained stool in rectum    :  Dr. Manny Min  Assistant(s): Endoscopy RN-1: Nohemy Zamora RN  Endoscopy RN-2: Brianna Hutchinson RN    Specimens:   ID Type Source Tests Collected by Time Destination   1 : Sigmoid colon biopsy Preservative Sigmoid  Dalton Mix MD 8/31/2022 2608 Pathology     H. Pylori  no    Assessment:  Intra-procedure medications   Anesthesia gave intra-procedure sedation and medications, see anesthesia flow sheet yes    Intravenous fluids: NS@ KVO     Vital signs stable     Abdominal assessment: round and soft     Recommendation:  Discharge patient per MD order.   Family or Friend Spouse in [address] [place] [address]  Permission to share finding with family or friend yes

## 2022-08-31 NOTE — DISCHARGE INSTRUCTIONS
[address] [place]. Garrett Brown MD  [phone]      August 31, 2022    Sarah Yi  YOB: 1950    COLONOSCOPY DISCHARGE INSTRUCTIONS    If there is redness at IV site you should apply warm compress to area. If redness or soreness persist contact Dr. Garrett Brown or your primary care doctor. There may be a slight amount of blood passed from the rectum. Gaseous discomfort may develop, but walking, belching will help relieve this. You may not operate a vehicle for 12 hours  You may not operate machinery or dangerous appliances for rest of today  You may not drink alcoholic beverages for 12 hours  Avoid making any critical decisions for 24 hours    DIET:  You may resume your normal diet, but some patients find that heavy or large meals may lead to indigestion or vomiting. I suggest a light meal as first food intake. MEDICATIONS:  The use of some over-the-counter pain medication may lead to bleeding after colon biopsies or polyp removal.  Tylenol (also called acetaminophen) is safe to take even if you have had colonoscopy with polyp removal.  Based on the procedure you can resume baby-dose aspirin (81 mg) and may safely take anticoagulation (like apixaban (Eliquis), dabigatran (Pradaxa), edoxaban (Elfrieda Zeke), rivaroxaban (Xarelto) or warfarin (Coumadin)) or antiplatelet medications (high dose aspirin 325mg, Clopidogrel (Plavix), Prasugrel (Effient), Ticagrelor (Brilinta))for the next two (48 hours) days. ACTIVITY:  You may resume your normal household activities, but it is recommended that you spend the remainder of the day resting -  avoid any strenuous activity. CALL DR. KAMINSKI'S OFFICE IF:  Increasing pain, nausea, vomiting  Abdominal distension (swelling)  Significant new or increased bleeding (oral or rectal)  Fever/Chills  Chest pain/shortness of breath                       Additional instructions:   No cancer or polyps identified on this exam aside from the rectum, which had large amount of solid stool retained (likely due to central cord syndrome). Recommend a flexible sigmoidoscopy (endoscopic exam of just this area) in 3-6 months. You do not need a whole bowel prep, but you will complete two enemas the morning of your procedure. It was an honor to be your doctor today. Please let me or my office staff know if you have any feedback about today's procedure. Bob Barraza MD, August 31, 2022    Colonoscopy saves lives, and can prevent colon cancer. Everyone aged 48 or older needs colonoscopy.   Tell your family and friends: get the test!

## 2022-08-31 NOTE — H&P
Pre-Endoscopy H&P   Chief complaint: screening or surveillance of previous colon polyps    HPI:  Patient presents for procedure. The indication for the procedure, the patient's history and the patient's current medications are reviewed prior to the procedure and that data is reported on the endoscopy note in the chart to which this document is attached. Any significant complaints with regard to organ systems will be noted, and if not mentioned then a review of systems is not contributory. Past Medical History:   Diagnosis Date    Cancer ([facility] [address]     BCCA skin    Central cord syndrome ([facility] [address]     Family history of skin cancer     Hypertension     Ill-defined condition     meineer's disease      Past Surgical History:   Procedure Laterality Date    COLONOSCOPY N/A 7/26/2017    COLONOSCOPY performed by Kenneth Awan MD at [facility] ENDOSCOPY    HX HEENT      wisdom teeth extracted    HX MOHS PROCEDURES  06/20/2017    [facility] R cheek by Dr. Fareed Newby History     Tobacco Use    Smoking status: Never    Smokeless tobacco: Never   Substance Use Topics    Alcohol use: Yes     Comment: twice a week      History reviewed. No pertinent family history. Allergies   Allergen Reactions    Lorazepam Other (comments)     Made him hyper, could not sit still for mri      Prior to Admission Medications   Prescriptions Last Dose Informant Patient Reported? Taking?   acetaminophen (TYLENOL) 325 mg tablet   No No   Sig: Take 2 Tabs by mouth every four (4) hours as needed for Pain or Fever. bisacodyL (DULCOLAX) 10 mg supp   Yes Yes   Sig: Insert 10 mg into rectum daily as needed for Constipation. bisacodyl (DULCOLAX) 5 mg EC tablet Not Taking  No No   Sig: Take 1 Tab by mouth daily as needed for Constipation. Patient not taking: Reported on 8/31/2022   famotidine (PEPCID) 10 mg tablet   Yes No   Sig: Take 10 mg by mouth as needed.    sildenafiL (REVATIO) 20 mg tablet 7/31/2022  Yes Yes   Sig: Take 20 mg by mouth as needed. tamsulosin (FLOMAX) 0.4 mg capsule 8/29/2022  No Yes   Sig: Take 1 Cap by mouth daily. Facility-Administered Medications: None       PHYSICAL EXAM:  The patient is examined immediately prior to the procedure. Visit Vitals  BP (!) 176/77 (BP 1 Location: Left arm, BP Patient Position: At rest)   Pulse 73   Temp 98.2 °F (36.8 °C)   Resp 18   Ht 5' 8\" (1.727 m)   Wt 60.8 kg (134 lb)   SpO2 100%   BMI 20.37 kg/m²     Gen: Appears comfortable, no distress. Pulm: comfortable respirations with no abnormal audible breath sounds  CV: heart regular, well perfused  GI: abdomen flat. ASSESSMENT:  Patient here for procedure. The indication for the procedure, the patient's history and the patient's current medications are reviewed prior to the procedure and that data is reported on the endoscopy note in the chart to which this document is attached. PLAN:  Informed consent discussion held, patient afforded an opportunity to ask questions and all questions answered. After being advised of the risks, benefits, and alternatives, the patient requested that we proceed and indicated so on a written consent form. Will proceed with procedure as planned.   Normand Cowden, MD

## 2022-08-31 NOTE — PROGRESS NOTES

## 2022-08-31 NOTE — ANESTHESIA PREPROCEDURE EVALUATION
Relevant Problems   NEUROLOGY   (+) Alcohol intoxication delirium (HCC)       Anesthetic History   No history of anesthetic complications            Review of Systems / Medical History  Patient summary reviewed, nursing notes reviewed and pertinent labs reviewed    Pulmonary  Within defined limits                 Neuro/Psych       CVA      Comments: Status post C3-4 anterior cervical diskectomy fusion for central cord syndrome and spinal cord canal change at C3-4 secondary to a fall at home. 2.  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 3.  Probable seizures. Discharged on Keppra twice daily. 4.  Meniere's disease. As needed meclizine for any dizziness.  Cardiovascular  Within defined limits  Hypertension              Exercise tolerance: >4 METS     GI/Hepatic/Renal     GERD           Endo/Other  Within defined limits           Other Findings   Comments:          Physical Exam    Airway  Mallampati: II  TM Distance: > 6 cm  Neck ROM: normal range of motion   Mouth opening: Normal     Cardiovascular  Regular rate and rhythm,  S1 and S2 normal,  no murmur, click, rub, or gallop             Dental  No notable dental hx       Pulmonary  Breath sounds clear to auscultation               Abdominal  GI exam deferred       Other Findings            Anesthetic Plan    ASA: 3  Anesthesia type: MAC          Induction: Intravenous  Anesthetic plan and risks discussed with: Patient

## 2022-08-31 NOTE — PROCEDURES
[facility].  [address] [place] [address] [address], [address]  [phone]                              Colonoscopy Procedure Note      Indications:    Family history of coloretal adenoma  (screening only)     :  Gordon Holt MD    Staff: Endoscopy RN-1: Michelet Redmond RN  Endoscopy RN-2: Krystle Galdamez RN    Referring Provider: Casimiro Nava MD    Sedation: MAC    Procedure Details:  After informed consent was obtained with all risks and benefits of procedure explained and preoperative exam completed, the patient was taken to the endoscopy suite and placed in the left lateral decubitus position. Upon sequential sedation as per above, a digital rectal exam was performed per below. The Olympus videocolonoscope was inserted in the rectum and carefully advanced to the cecum, which was identified by the ileocecal valve and appendiceal orifice. The quality of preparation was inadequate. Vantage Bowel Prep Score : 0/2/2/4 . The colonoscope was slowly withdrawn with careful evaluation between folds. Retroflexion in the rectum was performed. Findings:   Rectum: large amount of solid stool unable to completely remove despite digital attempts at disimpaction, therefore unable to perform complete exam in this segment   Sigmoid: mild patchy erythema and edema of the mucosa diffusely in this segment, likely secondary to chronic stool impaction, biopsied to exclude other forms of colopathy   Descending Colon: normal  Transverse Colon: normal  Ascending Colon: normal  Cecum: normal     Specimen Removed:    ID Type Source Tests Collected by Time Destination   1 : Sigmoid colon biopsy Preservative Sigmoid  Peace Medel MD 8/31/2022 3223 Pathology       Complications: None. EBL:  none    Impression:    See Postoperative diagnosis above    Recommendations:   - Resume normal medications. - Resume previous diet.   - Recommend repeat flexible sigmoidoscopy in 3-6 months to complete exam of rectum    Discharge Disposition:  Home in the company of a  when able to ambulate.     Gris Moya MD  8/31/2022  9:20 AM

## 2022-08-31 NOTE — ANESTHESIA POSTPROCEDURE EVALUATION
Post-Anesthesia Evaluation and Assessment    Patient: Beverley Orozco MRN: [id number]  SSN: [id number]    YOB: 1950  Age: 67 y.o. Sex: male      I have evaluated the patient and they are stable and ready for discharge from the PACU. Cardiovascular Function/Vital Signs  Visit Vitals  /62   Pulse 72   Temp 36.6 °C (97.8 °F)   Resp 15   Ht 5' 8\" (1.727 m)   Wt 60.8 kg (134 lb)   SpO2 98%   BMI 20.37 kg/m²       Patient is status post MAC anesthesia for Procedure(s):  COLONOSCOPY  COLON BIOPSY. Nausea/Vomiting: None    Postoperative hydration reviewed and adequate. Pain:  Pain Scale 1: Visual (08/31/22 0920)  Pain Intensity 1: 0 (08/31/22 0920)   Managed    Neurological Status: At baseline    Mental Status, Level of Consciousness: Alert and  oriented to person, place, and time    Pulmonary Status:   O2 Device: None (Room air) (08/31/22 0920)   Adequate oxygenation and airway patent    Complications related to anesthesia: None    Post-anesthesia assessment completed. No concerns    Signed By: Elisa Hilliard MD     August 31, 2022              Procedure(s):  COLONOSCOPY  COLON BIOPSY. MAC    <[email or web address]>    INITIAL Post-op Vital signs:   Vitals Value Taken Time   BP 98/66 08/31/22 0930   Temp 36.6 °C (97.8 °F) 08/31/22 0920   Pulse 72 08/31/22 0932   Resp 11 08/31/22 0932   SpO2 97 % 08/31/22 0932   Vitals shown include unvalidated device data.

## 2023-03-20 ENCOUNTER — HOSPITAL ENCOUNTER (OUTPATIENT)
Age: 73
Setting detail: OUTPATIENT SURGERY
Discharge: HOME | End: 2023-03-20
Attending: INTERNAL MEDICINE | Admitting: INTERNAL MEDICINE
Payer: MEDICARE

## 2023-03-20 ENCOUNTER — ANESTHESIA EVENT (OUTPATIENT)
Dept: ENDOSCOPY | Age: 73
End: 2023-03-20
Payer: MEDICARE

## 2023-03-20 ENCOUNTER — ANESTHESIA (OUTPATIENT)
Dept: ENDOSCOPY | Age: 73
End: 2023-03-20
Payer: MEDICARE

## 2023-03-20 VITALS
WEIGHT: 140 LBS | DIASTOLIC BLOOD PRESSURE: 82 MMHG | HEART RATE: 65 BPM | RESPIRATION RATE: 11 BRPM | TEMPERATURE: 99.1 F | BODY MASS INDEX: 21.22 KG/M2 | SYSTOLIC BLOOD PRESSURE: 130 MMHG | OXYGEN SATURATION: 99 % | HEIGHT: 68 IN

## 2023-03-20 PROCEDURE — 74011000250 HC RX REV CODE- 250: Performed by: STUDENT IN AN ORGANIZED HEALTH CARE EDUCATION/TRAINING PROGRAM

## 2023-03-20 PROCEDURE — 76040000019: Performed by: INTERNAL MEDICINE

## 2023-03-20 PROCEDURE — 76060000031 HC ANESTHESIA FIRST 0.5 HR: Performed by: INTERNAL MEDICINE

## 2023-03-20 PROCEDURE — 74011250636 HC RX REV CODE- 250/636: Performed by: STUDENT IN AN ORGANIZED HEALTH CARE EDUCATION/TRAINING PROGRAM

## 2023-03-20 RX ORDER — PROPOFOL 10 MG/ML
INJECTION, EMULSION INTRAVENOUS AS NEEDED
Status: DISCONTINUED | OUTPATIENT
Start: 2023-03-20 | End: 2023-03-20 | Stop reason: HOSPADM

## 2023-03-20 RX ORDER — LIDOCAINE HYDROCHLORIDE 20 MG/ML
INJECTION, SOLUTION EPIDURAL; INFILTRATION; INTRACAUDAL; PERINEURAL AS NEEDED
Status: DISCONTINUED | OUTPATIENT
Start: 2023-03-20 | End: 2023-03-20 | Stop reason: HOSPADM

## 2023-03-20 RX ORDER — SODIUM CHLORIDE, SODIUM LACTATE, POTASSIUM CHLORIDE, CALCIUM CHLORIDE 600; 310; 30; 20 MG/100ML; MG/100ML; MG/100ML; MG/100ML
INJECTION, SOLUTION INTRAVENOUS
Status: DISCONTINUED | OUTPATIENT
Start: 2023-03-20 | End: 2023-03-20 | Stop reason: HOSPADM

## 2023-03-20 RX ADMIN — LIDOCAINE HYDROCHLORIDE 40 MG: 20 INJECTION, SOLUTION EPIDURAL; INFILTRATION; INTRACAUDAL; PERINEURAL at 13:46

## 2023-03-20 RX ADMIN — PROPOFOL 50 MG: 10 INJECTION, EMULSION INTRAVENOUS at 13:50

## 2023-03-20 RX ADMIN — PROPOFOL 100 MG: 10 INJECTION, EMULSION INTRAVENOUS at 13:46

## 2023-03-20 RX ADMIN — SODIUM CHLORIDE, POTASSIUM CHLORIDE, SODIUM LACTATE AND CALCIUM CHLORIDE: 600; 310; 30; 20 INJECTION, SOLUTION INTRAVENOUS at 13:44

## 2023-03-20 RX ADMIN — PROPOFOL 50 MG: 10 INJECTION, EMULSION INTRAVENOUS at 13:48

## 2023-03-20 NOTE — ANESTHESIA POSTPROCEDURE EVALUATION
Procedure(s):  SIGMOIDOSCOPY FLEXIBLE. MAC    Anesthesia Post Evaluation      Multimodal analgesia: multimodal analgesia used between 6 hours prior to anesthesia start to PACU discharge  Patient location during evaluation: PACU  Level of consciousness: awake  Pain management: adequate  Airway patency: patent  Anesthetic complications: no  Cardiovascular status: acceptable  Respiratory status: acceptable  Hydration status: acceptable  Post anesthesia nausea and vomiting:  none  Final Post Anesthesia Temperature Assessment:  Normothermia (36.0-37.5 degrees C)      INITIAL Post-op Vital signs:   Vitals Value Taken Time   /78 03/20/23 1415   Temp 37.3 °C (99.1 °F) 03/20/23 1401   Pulse 66 03/20/23 1417   Resp 16 03/20/23 1417   SpO2 99 % 03/20/23 1417   Vitals shown include unvalidated device data.

## 2023-03-20 NOTE — DISCHARGE INSTRUCTIONS
[id number] [facility] Alejandro Espinal MD  [phone]      March 20, 2023    Bettye George  YOB: 1950    COLONOSCOPY DISCHARGE INSTRUCTIONS    If there is redness at IV site you should apply warm compress to area. If redness or soreness persist contact Dr. Therese Espinal or your primary care doctor. There may be a slight amount of blood passed from the rectum. Gaseous discomfort may develop, but walking, belching will help relieve this. You may not operate a vehicle for 12 hours  You may not operate machinery or dangerous appliances for rest of today  You may not drink alcoholic beverages for 12 hours  Avoid making any critical decisions for 24 hours    DIET:  You may resume your normal diet, but some patients find that heavy or large meals may lead to indigestion or vomiting. I suggest a light meal as first food intake. MEDICATIONS:  The use of some over-the-counter pain medication may lead to bleeding after colon biopsies or polyp removal.  Tylenol (also called acetaminophen) is safe to take even if you have had colonoscopy with polyp removal.  Based on the procedure you can resume baby-dose aspirin (81 mg) and may safely take anticoagulation (like apixaban (Eliquis), dabigatran (Pradaxa), edoxaban (Maria Elena Dunks), rivaroxaban (Xarelto) or warfarin (Coumadin)) or antiplatelet medications (high dose aspirin 325mg, Clopidogrel (Plavix), Prasugrel (Effient), Ticagrelor (Brilinta))for the next two (48 hours) days. ACTIVITY:  You may resume your normal household activities, but it is recommended that you spend the remainder of the day resting -  avoid any strenuous activity. CALL DR. KAMINSKI'S OFFICE IF:  Increasing pain, nausea, vomiting  Abdominal distension (swelling)  Significant new or increased bleeding (oral or rectal)  Fever/Chills  Chest pain/shortness of breath                       Additional instructions:   No polyps or masses identified in the rectum, sigmoid, or descending colon (extent of flexible sigmoidoscopy exam). Excellent prep in these segments. Can repeat colonoscopy in 5 years. Would advise at that time also completing enemas like you did for this procedure to make sure rectum is cleared out too. It was an honor to be your doctor today. Please let me or my office staff know if you have any feedback about today's procedure. Shayla Gastelum MD, March 20, 2023    Colonoscopy saves lives, and can prevent colon cancer. Everyone aged 48 or older needs colonoscopy.   Tell your family and friends: get the test!

## 2023-03-20 NOTE — PROCEDURES
[address] [facility].  [address] [place] [address] [address], [address]  [phone]                              Flexible Sigmoidoscopy Procedure Note      Indications:    incomplete rectal prep      :  Paula Meyers MD    Staff: Endoscopy Technician-1: Samira Mcneal  Endoscopy RN-1: Kurtis Wilkes RN    Referring Provider: Charlotte Gorman MD    Sedation:  MAC anesthesia    Procedure Details:  After informed consent was obtained with all risks and benefits of procedure explained and preoperative exam completed, the patient was taken to the endoscopy suite and placed in the left lateral decubitus position. Upon sequential sedation as per above, a digital rectal exam was performed. The Olympus videocolonoscope was inserted in the rectum and carefully advanced to the sigmoid colon. The quality of preparation was adequate. The colonoscope was slowly withdrawn with careful evaluation between folds. Retroflexion in the rectum was performed. Findings: No polyps or masses  Rectum: normal  Sigmoid: mild diverticulosis  Descending Colon: normal  Transverse colon: large amount of thick adherent stool, as expected for flex sig prep    Interventions:  none           Specimens Removed:  * No specimens in log *    Complications: None. EBL:      Impression:    See Postoperative diagnosis above    Recommendations:   - Repeat colonoscopy in 5 years (complete an enema as well for this colonoscopy) due to high risk family history of colorectal cancer/polyps    Discharge Disposition:  Home in the company of a  when able to ambulate.     Paula Meyers MD  3/20/2023  1:55 PM

## 2023-03-27 NOTE — H&P
Pre-Endoscopy H&P   Chief complaint: screening or surveillance of previous colon polyps    HPI:  Patient presents for procedure. The indication for the procedure, the patient's history and the patient's current medications are reviewed prior to the procedure and that data is reported on the endoscopy note in the chart to which this document is attached. Any significant complaints with regard to organ systems will be noted, and if not mentioned then a review of systems is not contributory. Past Medical History:   Diagnosis Date    Cancer ([facility] [address]     BCCA skin    Central cord syndrome ([facility] [address]     Family history of skin cancer     Hypertension     Ill-defined condition     meineer's disease      Past Surgical History:   Procedure Laterality Date    COLONOSCOPY N/A 7/26/2017    COLONOSCOPY performed by Ary Bah MD at [facility] ENDOSCOPY    COLONOSCOPY N/A 8/31/2022    COLONOSCOPY performed by Xochitl Baez MD at [place] [address] N/A 3/20/2023    [address] performed by Xochitl Baez MD at [facility] ENDOSCOPY    HX HEENT      wisdom teeth extracted    HX MOHS PROCEDURES  06/20/2017    [address] R cheek by Dr. Gallagher Graft History     Tobacco Use    Smoking status: Never    Smokeless tobacco: Never   Substance Use Topics    Alcohol use: Yes     Comment: twice a week      No family history on file. Allergies   Allergen Reactions    Lorazepam Other (comments)     Made him hyper, could not sit still for mri      Prior to Admission Medications   Prescriptions Last Dose Informant Patient Reported? Taking?   acetaminophen (TYLENOL) 325 mg tablet Unknown  No No   Sig: Take 2 Tabs by mouth every four (4) hours as needed for Pain or Fever. bisacodyL (DULCOLAX) 10 mg supp 3/20/2023  Yes Yes   Sig: Insert 10 mg into rectum daily as needed for Constipation. famotidine (PEPCID) 10 mg tablet 3/13/2023  Yes Yes   Sig: Take 10 mg by mouth as needed. ibuprofen (ADVIL PO) 3/17/2023  Yes Yes   Sig: Take  by mouth.   sildenafiL (REVATIO) 20 mg tablet 3/17/2023  Yes No   Sig: Take 20 mg by mouth as needed. tamsulosin (FLOMAX) 0.4 mg capsule 3/18/2023  No No   Sig: Take 1 Cap by mouth daily. Facility-Administered Medications: None       PHYSICAL EXAM:  The patient is examined immediately prior to the procedure. Visit Vitals  /82   Pulse 65   Temp 99.1 °F (37.3 °C)   Resp 11   Ht 5' 8\" (1.727 m)   Wt 63.5 kg (140 lb)   SpO2 99%   BMI 21.29 kg/m²     Gen: Appears comfortable, no distress. Pulm: comfortable respirations with no abnormal audible breath sounds  CV: heart regular, well perfused  GI: abdomen flat. ASSESSMENT:  Patient here for procedure. The indication for the procedure, the patient's history and the patient's current medications are reviewed prior to the procedure and that data is reported on the endoscopy note in the chart to which this document is attached. PLAN:  Informed consent discussion held, patient afforded an opportunity to ask questions and all questions answered. After being advised of the risks, benefits, and alternatives, the patient requested that we proceed and indicated so on a written consent form. Will proceed with procedure as planned.   Ashok Askew MD

## 2023-05-21 RX ORDER — FAMOTIDINE 10 MG
10 TABLET ORAL PRN
COMMUNITY

## 2023-05-21 RX ORDER — SILDENAFIL CITRATE 20 MG/1
20 TABLET ORAL PRN
COMMUNITY

## 2023-05-21 RX ORDER — BISACODYL 10 MG
10 SUPPOSITORY, RECTAL RECTAL DAILY PRN
COMMUNITY

## 2023-05-21 RX ORDER — ACETAMINOPHEN 325 MG/1
650 TABLET ORAL EVERY 4 HOURS PRN
COMMUNITY
Start: 2019-06-01

## 2023-05-21 RX ORDER — TAMSULOSIN HYDROCHLORIDE 0.4 MG/1
0.4 CAPSULE ORAL DAILY
COMMUNITY
Start: 2019-06-02

## (undated) DEVICE — BIPOLAR IRRIGATOR INTEGRATED TUBING AND BIPOLAR CORD SET, DISPOSABLE

## (undated) DEVICE — INTENDED FOR TISSUE SEPARATION, AND OTHER PROCEDURES THAT REQUIRE A SHARP SURGICAL BLADE TO PUNCTURE OR CUT.: Brand: BARD-PARKER ® CARBON RIB-BACK BLADES

## (undated) DEVICE — INSULATED BLADE ELECTRODE: Brand: EDGE

## (undated) DEVICE — NEEDLE HYPO 18GA L1.5IN PNK S STL HUB POLYPR SHLD REG BVL

## (undated) DEVICE — INFECTION CONTROL KIT SYS

## (undated) DEVICE — QUILTED PREMIUM COMFORT UNDERPAD,EXTRA HEAVY: Brand: WINGS

## (undated) DEVICE — SET EXTN TBNG L BOR 4 W STPCOCK ST 32IN PRIMING VOL 6ML

## (undated) DEVICE — BAG BELONG PT PERS CLEAR HANDL

## (undated) DEVICE — KIT IV STRT W CHLORAPREP PD 1ML

## (undated) DEVICE — PREP SKN PREVAIL 40ML APPL --

## (undated) DEVICE — NEEDLE SPNL 20GA L3.5IN YEL HUB S STL REG WALL FIT STYL W/

## (undated) DEVICE — FLOSEAL MATRIX IS INDICATED IN SURGICAL PROCEDURES (OTHER THAN IN OPHTHALMIC) AS AN ADJUNCT TO HEMOSTASIS WHEN CONTROL OF BLEEDING BY LIGATURE OR CONVENTIONALPROCEDURES IS INEFFECTIVE OR IMPRACTICAL.: Brand: FLOSEAL HEMOSTATIC MATRIX

## (undated) DEVICE — CONNECTOR TBNG AUX H2O JET DISP FOR OLY 160/180 SER

## (undated) DEVICE — SUTURE MCRYL SZ 4-0 L27IN ABSRB UD L19MM PS-2 1/2 CIR PRIM Y426H

## (undated) DEVICE — COVER,TABLE,HEAVY DUTY,60"X90",STRL: Brand: MEDLINE

## (undated) DEVICE — BW-412T DISP COMBO CLEANING BRUSH: Brand: SINGLE USE COMBINATION CLEANING BRUSH

## (undated) DEVICE — 1200 GUARD II KIT W/5MM TUBE W/O VAC TUBE: Brand: GUARDIAN

## (undated) DEVICE — SYRINGE MED 20ML STD CLR PLAS LUERLOCK TIP N CTRL DISP

## (undated) DEVICE — DRAPE,LAPAROTOMY,PED,STERILE: Brand: MEDLINE

## (undated) DEVICE — LAMINECTOMY RICHMOND-LF: Brand: MEDLINE INDUSTRIES, INC.

## (undated) DEVICE — ENDO CARRY-ON PROCEDURE KIT INCLUDES ENZYMATIC SPONGE, GAUZE, BIOHAZARD LABEL, TRAY, LUBRICANT, DIRTY SCOPE LABEL, WATER LABEL, TRAY, DRAWSTRING PAD, AND DEFENDO 4-PIECE KIT.: Brand: ENDO CARRY-ON PROCEDURE KIT

## (undated) DEVICE — Z DISCONTINUED USE 2751540 TUBING IRRIG L10IN DISP PMP ENDOGATOR

## (undated) DEVICE — MASTISOL ADHESIVE LIQ 2/3ML

## (undated) DEVICE — RETRIEVER ENDOSCP L230CM DIA2.5MM NET W3XL5.5CM MIN WRK CHN

## (undated) DEVICE — CATH IV AUTOGRD BC BLU 22GA 25 -- INSYTE

## (undated) DEVICE — HANDLE LT SNAP ON ULT DURABLE LENS FOR TRUMPF ALC DISPOSABLE

## (undated) DEVICE — STRAP POS KNEE BODY VELC

## (undated) DEVICE — SOLUTION IV 1000ML 0.9% SOD CHL

## (undated) DEVICE — SPONGE: SPECIALTY PEANUT XR 100/CS: Brand: MEDICAL ACTION INDUSTRIES

## (undated) DEVICE — TUBING HYDR IRR --

## (undated) DEVICE — AIRLIFE™ U/CONNECT-IT OXYGEN TUBING 7 FEET (2.1 M) CRUSH-RESISTANT OXYGEN TUBING, VINYL TIPPED: Brand: AIRLIFE™

## (undated) DEVICE — SOLIDIFIER FLUID 3000 CC ABSORB

## (undated) DEVICE — DRAPE SURG W41XL74IN CLR FULL SZ C ARM 3 ADH POLY STRP E

## (undated) DEVICE — SUTURE VCRL SZ 2-0 L18IN ABSRB UD L26MM CP-2 1/2 CIR REV J762D

## (undated) DEVICE — SET ADMIN 16ML TBNG L100IN 2 Y INJ SITE IV PIGGY BK DISP

## (undated) DEVICE — BONE WAX WHITE: Brand: BONE WAX WHITE

## (undated) DEVICE — SOLUTION IV 250ML 0.9% SOD CHL CLR INJ FLX BG CONT PRT CLSR

## (undated) DEVICE — TELFA ADHESIVE ISLAND DRESSING: Brand: TELFA

## (undated) DEVICE — DRAPE,REIN 53X77,STERILE: Brand: MEDLINE

## (undated) DEVICE — TOOL 14MH30 LEGEND 14CM 3MM: Brand: MIDAS REX ™

## (undated) DEVICE — Device

## (undated) DEVICE — SYR 50ML SLIP TIP NSAF LF STRL --

## (undated) DEVICE — SCREW EXT FIX L14MM FOR DISTRCTN

## (undated) DEVICE — MEDI-VAC NON-CONDUCTIVE SUCTION TUBING: Brand: CARDINAL HEALTH

## (undated) DEVICE — STERILE POLYISOPRENE POWDER-FREE SURGICAL GLOVES WITH EMOLLIENT COATING: Brand: PROTEXIS

## (undated) DEVICE — KENDALL RADIOLUCENT FOAM MONITORING ELECTRODE -RECTANGULAR SHAPE: Brand: KENDALL

## (undated) DEVICE — KENDALL SCD EXPRESS SLEEVES, KNEE LENGTH, MEDIUM: Brand: KENDALL SCD

## (undated) DEVICE — FORCEPS BX L240CM JAW DIA2.2MM RAD JAW 4 HOT DISP

## (undated) DEVICE — DRAPE MICSCP W46XL120IN POLY DRAWSTRAP W STEREO OBS TB AND